# Patient Record
Sex: FEMALE | Race: WHITE | NOT HISPANIC OR LATINO | Employment: UNEMPLOYED | URBAN - METROPOLITAN AREA
[De-identification: names, ages, dates, MRNs, and addresses within clinical notes are randomized per-mention and may not be internally consistent; named-entity substitution may affect disease eponyms.]

---

## 2017-10-02 ENCOUNTER — ALLSCRIPTS OFFICE VISIT (OUTPATIENT)
Dept: OTHER | Facility: OTHER | Age: 62
End: 2017-10-02

## 2017-10-02 DIAGNOSIS — Z12.31 ENCOUNTER FOR SCREENING MAMMOGRAM FOR MALIGNANT NEOPLASM OF BREAST: ICD-10-CM

## 2017-10-04 LAB
HPV 18 (HISTORICAL): NOT DETECTED
HPV HIGH RISK 16/18 (HISTORICAL): NOT DETECTED
HPV16 (HISTORICAL): NOT DETECTED
PAP (HISTORICAL): NORMAL

## 2017-10-09 ENCOUNTER — GENERIC CONVERSION - ENCOUNTER (OUTPATIENT)
Dept: OTHER | Facility: OTHER | Age: 62
End: 2017-10-09

## 2018-01-13 VITALS
HEIGHT: 68 IN | DIASTOLIC BLOOD PRESSURE: 74 MMHG | WEIGHT: 209 LBS | SYSTOLIC BLOOD PRESSURE: 128 MMHG | BODY MASS INDEX: 31.67 KG/M2

## 2019-03-11 ENCOUNTER — ANNUAL EXAM (OUTPATIENT)
Dept: OBGYN CLINIC | Facility: CLINIC | Age: 64
End: 2019-03-11
Payer: COMMERCIAL

## 2019-03-11 VITALS
HEIGHT: 68 IN | BODY MASS INDEX: 32.43 KG/M2 | SYSTOLIC BLOOD PRESSURE: 140 MMHG | WEIGHT: 214 LBS | DIASTOLIC BLOOD PRESSURE: 90 MMHG

## 2019-03-11 DIAGNOSIS — Z01.419 ENCOUNTER FOR GYNECOLOGICAL EXAMINATION WITHOUT ABNORMAL FINDING: Primary | ICD-10-CM

## 2019-03-11 PROCEDURE — S0612 ANNUAL GYNECOLOGICAL EXAMINA: HCPCS | Performed by: OBSTETRICS & GYNECOLOGY

## 2019-03-11 RX ORDER — OMEPRAZOLE 20 MG/1
20 CAPSULE, DELAYED RELEASE ORAL
COMMUNITY

## 2019-03-11 RX ORDER — RAMIPRIL 10 MG/1
CAPSULE ORAL
COMMUNITY
End: 2020-08-11 | Stop reason: SDUPTHER

## 2019-03-11 RX ORDER — CHOLECALCIFEROL (VITAMIN D3) 125 MCG
CAPSULE ORAL
COMMUNITY

## 2019-03-11 RX ORDER — ASPIRIN 81 MG/1
TABLET, CHEWABLE ORAL
COMMUNITY

## 2019-03-11 NOTE — PROGRESS NOTES
Subjective Luiz Spurling is a 60 y o   female who presents for annual well woman exam  Patient reports no bleeding, spotting, or discharge  Patient reports No hot flashes/night sweats,  Not sexually active with , No vaginal dryness, sleeping poorly same as prior, some BENJAMIN reviewed and discussed  Patient still notes salty taste in her mouth and salt in his that she notes to her skin  Advised patient to continue evaluation with primary and get labs which are pending consider further evaluation as needed with endocrinology  Patient does also follow with Rheumatology and has fibromyalgia and Reynauds        Current contraception: post menopausal status  History of abnormal Pap smear: no  Family history of uterine or ovarian cancer: no  Regular self breast exam: yes  History of abnormal mammogram: no  Family history of breast cancer: no    Menstrual History:    Menarche age: 17  LMP      Past Medical History:   Diagnosis Date    Dahl's esophagus     Diverticulitis     Fibromyalgia      Past Surgical History:   Procedure Laterality Date     SECTION  1986     SECTION  1989    ELBOW SURGERY       OB History        3    Para   2    Term                AB   1    Living   2       SAB   1    TAB        Ectopic        Multiple        Live Births   2                 Current Outpatient Medications:     aspirin 81 mg chewable tablet, Chew, Disp: , Rfl:     Cholecalciferol (VITAMIN D3) 1000 units CAPS, Take by mouth, Disp: , Rfl:     omeprazole (PRILOSEC) 20 mg delayed release capsule, Take by mouth, Disp: , Rfl:     ramipril (ALTACE) 10 MG capsule, Take by mouth, Disp: , Rfl:   Allergies   Allergen Reactions    Latex     Meperidine     Penicillins     Sulfa Antibiotics      Social History     Tobacco Use    Smoking status: Never Smoker    Smokeless tobacco: Never Used   Substance Use Topics    Alcohol use: Not Currently    Drug use: Never Review of Systems  Review of Systems   Constitutional: Positive for unexpected weight change  Negative for fatigue and fever  HENT: Negative for dental problem, sinus pressure and sinus pain  Eyes: Negative for visual disturbance  Respiratory: Negative for cough, shortness of breath and wheezing  Cardiovascular: Negative for chest pain and leg swelling  Gastrointestinal: Negative for blood in stool, constipation, diarrhea, nausea and vomiting  Endocrine: Negative for cold intolerance, heat intolerance and polydipsia  Genitourinary: Negative for dysuria, frequency, hematuria, menstrual problem and pelvic pain  Musculoskeletal: Positive for back pain  Negative for arthralgias  Neurological: Negative for dizziness, seizures and headaches  Psychiatric/Behavioral: The patient is nervous/anxious  Objective     Vitals:    19 1625   BP: 140/90   Weight: 97 1 kg (214 lb)   Height: 5' 8" (1 727 m)       General:   alert and oriented, in no acute distress   Heart: regular rate and rhythm, S1, S2 normal, no murmur, click, rub or gallop   Lungs: clear to auscultation bilaterally   Abdomen: soft, non-tender, without masses or organomegaly   Vulva: normal   Vagina: normal mucosa, atrophic   Cervix: no cervical motion tenderness and no lesions   Uterus: anteverted, mobile, non-tender   Adnexa: normal adnexa and no mass, fullness, tenderness   Breast inspection negative, no nipple discharge or bleeding, no masses or nodularity palpable  Rectal negative, stool guaiac negative  Pupils equal round reactive to light and accommodation  Throat clear no lesions or findings  Thyroid normal no masses or nodules       Assessment   60year-old  here for annual exam continuing workup with primary physician regarding saltiness  Patient also plans to follow up with GI for upper and lower endoscopy    2017 normal Pap, 2017 normal mammogram     Plan   Patient given 2 slip for mammogram, return in 1-2 years for annual or sooner as needed

## 2019-06-21 ENCOUNTER — TRANSCRIBE ORDERS (OUTPATIENT)
Dept: LAB | Facility: CLINIC | Age: 64
End: 2019-06-21

## 2019-06-21 ENCOUNTER — APPOINTMENT (OUTPATIENT)
Dept: LAB | Facility: CLINIC | Age: 64
End: 2019-06-21
Payer: COMMERCIAL

## 2019-06-21 DIAGNOSIS — E55.9 AVITAMINOSIS D: Primary | ICD-10-CM

## 2019-06-21 DIAGNOSIS — E03.9 MYXEDEMA HEART DISEASE: ICD-10-CM

## 2019-06-21 DIAGNOSIS — R73.09 IMPAIRED GLUCOSE TOLERANCE TEST: ICD-10-CM

## 2019-06-21 DIAGNOSIS — D64.9 ANEMIA, UNSPECIFIED TYPE: Primary | ICD-10-CM

## 2019-06-21 DIAGNOSIS — E87.0 HYPEROSMOLALITY AND/OR HYPERNATREMIA: ICD-10-CM

## 2019-06-21 DIAGNOSIS — E55.9 AVITAMINOSIS D: ICD-10-CM

## 2019-06-21 DIAGNOSIS — I51.9 MYXEDEMA HEART DISEASE: ICD-10-CM

## 2019-06-21 DIAGNOSIS — E78.5 HYPERLIPIDEMIA, UNSPECIFIED HYPERLIPIDEMIA TYPE: ICD-10-CM

## 2019-06-21 LAB
ALBUMIN SERPL BCP-MCNC: 4.4 G/DL (ref 3.5–5)
ALP SERPL-CCNC: 68 U/L (ref 46–116)
ALT SERPL W P-5'-P-CCNC: 54 U/L (ref 12–78)
ANION GAP SERPL CALCULATED.3IONS-SCNC: 2 MMOL/L (ref 4–13)
AST SERPL W P-5'-P-CCNC: 29 U/L (ref 5–45)
BASOPHILS # BLD AUTO: 0.07 THOUSANDS/ΜL (ref 0–0.1)
BASOPHILS NFR BLD AUTO: 1 % (ref 0–1)
BILIRUB SERPL-MCNC: 0.68 MG/DL (ref 0.2–1)
BILIRUB UR QL STRIP: NEGATIVE
BUN SERPL-MCNC: 18 MG/DL (ref 5–25)
CALCIUM SERPL-MCNC: 9.4 MG/DL (ref 8.3–10.1)
CHLORIDE SERPL-SCNC: 101 MMOL/L (ref 100–108)
CHOLEST SERPL-MCNC: 243 MG/DL (ref 50–200)
CLARITY UR: CLEAR
CO2 SERPL-SCNC: 30 MMOL/L (ref 21–32)
COLOR UR: YELLOW
CREAT SERPL-MCNC: 0.97 MG/DL (ref 0.6–1.3)
EOSINOPHIL # BLD AUTO: 0.08 THOUSAND/ΜL (ref 0–0.61)
EOSINOPHIL NFR BLD AUTO: 1 % (ref 0–6)
ERYTHROCYTE [DISTWIDTH] IN BLOOD BY AUTOMATED COUNT: 13.1 % (ref 11.6–15.1)
EST. AVERAGE GLUCOSE BLD GHB EST-MCNC: 123 MG/DL
GFR SERPL CREATININE-BSD FRML MDRD: 63 ML/MIN/1.73SQ M
GLUCOSE P FAST SERPL-MCNC: 100 MG/DL (ref 65–99)
GLUCOSE UR STRIP-MCNC: NEGATIVE MG/DL
HBA1C MFR BLD: 5.9 % (ref 4.2–6.3)
HCT VFR BLD AUTO: 47.3 % (ref 34.8–46.1)
HDLC SERPL-MCNC: 52 MG/DL (ref 40–60)
HGB BLD-MCNC: 15.1 G/DL (ref 11.5–15.4)
HGB UR QL STRIP.AUTO: NEGATIVE
IMM GRANULOCYTES # BLD AUTO: 0.03 THOUSAND/UL (ref 0–0.2)
IMM GRANULOCYTES NFR BLD AUTO: 1 % (ref 0–2)
KETONES UR STRIP-MCNC: NEGATIVE MG/DL
LDLC SERPL CALC-MCNC: 148 MG/DL (ref 0–100)
LEUKOCYTE ESTERASE UR QL STRIP: NEGATIVE
LYMPHOCYTES # BLD AUTO: 2.11 THOUSANDS/ΜL (ref 0.6–4.47)
LYMPHOCYTES NFR BLD AUTO: 32 % (ref 14–44)
MCH RBC QN AUTO: 30 PG (ref 26.8–34.3)
MCHC RBC AUTO-ENTMCNC: 31.9 G/DL (ref 31.4–37.4)
MCV RBC AUTO: 94 FL (ref 82–98)
MONOCYTES # BLD AUTO: 0.65 THOUSAND/ΜL (ref 0.17–1.22)
MONOCYTES NFR BLD AUTO: 10 % (ref 4–12)
NEUTROPHILS # BLD AUTO: 3.71 THOUSANDS/ΜL (ref 1.85–7.62)
NEUTS SEG NFR BLD AUTO: 55 % (ref 43–75)
NITRITE UR QL STRIP: NEGATIVE
NONHDLC SERPL-MCNC: 191 MG/DL
NRBC BLD AUTO-RTO: 0 /100 WBCS
PH UR STRIP.AUTO: 6 [PH]
PLATELET # BLD AUTO: 328 THOUSANDS/UL (ref 149–390)
PMV BLD AUTO: 10.6 FL (ref 8.9–12.7)
POTASSIUM SERPL-SCNC: 4 MMOL/L (ref 3.5–5.3)
PROT SERPL-MCNC: 7.8 G/DL (ref 6.4–8.2)
PROT UR STRIP-MCNC: NEGATIVE MG/DL
RBC # BLD AUTO: 5.03 MILLION/UL (ref 3.81–5.12)
SODIUM SERPL-SCNC: 133 MMOL/L (ref 136–145)
SP GR UR STRIP.AUTO: 1.01 (ref 1–1.03)
TRIGL SERPL-MCNC: 216 MG/DL
TSH SERPL DL<=0.05 MIU/L-ACNC: 2.65 UIU/ML (ref 0.36–3.74)
UROBILINOGEN UR QL STRIP.AUTO: 0.2 E.U./DL
WBC # BLD AUTO: 6.65 THOUSAND/UL (ref 4.31–10.16)

## 2019-06-21 PROCEDURE — 81003 URINALYSIS AUTO W/O SCOPE: CPT

## 2019-06-21 PROCEDURE — 82306 VITAMIN D 25 HYDROXY: CPT

## 2019-06-21 PROCEDURE — 80061 LIPID PANEL: CPT

## 2019-06-21 PROCEDURE — 80053 COMPREHEN METABOLIC PANEL: CPT

## 2019-06-21 PROCEDURE — 85025 COMPLETE CBC W/AUTO DIFF WBC: CPT

## 2019-06-21 PROCEDURE — 83036 HEMOGLOBIN GLYCOSYLATED A1C: CPT

## 2019-06-21 PROCEDURE — 36415 COLL VENOUS BLD VENIPUNCTURE: CPT

## 2019-06-21 PROCEDURE — 84166 PROTEIN E-PHORESIS/URINE/CSF: CPT

## 2019-06-21 PROCEDURE — 84166 PROTEIN E-PHORESIS/URINE/CSF: CPT | Performed by: PATHOLOGY

## 2019-06-21 PROCEDURE — 84443 ASSAY THYROID STIM HORMONE: CPT

## 2019-06-25 LAB
25(OH)D2 SERPL-MCNC: <1 NG/ML
25(OH)D3 SERPL-MCNC: 50 NG/ML
25(OH)D3+25(OH)D2 SERPL-MCNC: 50 NG/ML
ALBUMIN UR ELPH-MCNC: 100 %
ALPHA1 GLOB MFR UR ELPH: 0 %
ALPHA2 GLOB MFR UR ELPH: 0 %
B-GLOBULIN MFR UR ELPH: 0 %
GAMMA GLOB MFR UR ELPH: 0 %
PROT PATTERN UR ELPH-IMP: NORMAL
PROT UR-MCNC: 6 MG/DL

## 2020-01-23 ENCOUNTER — OFFICE VISIT (OUTPATIENT)
Dept: URGENT CARE | Facility: CLINIC | Age: 65
End: 2020-01-23
Payer: COMMERCIAL

## 2020-01-23 VITALS
DIASTOLIC BLOOD PRESSURE: 81 MMHG | HEIGHT: 67 IN | WEIGHT: 216 LBS | OXYGEN SATURATION: 98 % | TEMPERATURE: 98.4 F | HEART RATE: 88 BPM | RESPIRATION RATE: 16 BRPM | BODY MASS INDEX: 33.9 KG/M2 | SYSTOLIC BLOOD PRESSURE: 177 MMHG

## 2020-01-23 DIAGNOSIS — M62.838 NECK MUSCLE SPASM: Primary | ICD-10-CM

## 2020-01-23 PROCEDURE — 99213 OFFICE O/P EST LOW 20 MIN: CPT | Performed by: PHYSICIAN ASSISTANT

## 2020-01-23 RX ORDER — METAXALONE 800 MG/1
800 TABLET ORAL 3 TIMES DAILY PRN
Qty: 8 TABLET | Refills: 0 | Status: SHIPPED | OUTPATIENT
Start: 2020-01-23 | End: 2020-08-11 | Stop reason: ALTCHOICE

## 2020-01-23 NOTE — PATIENT INSTRUCTIONS
Patient is unable to take anti-inflammatories secondary to past medical history  Tylenol as needed for pain  Ice several times daily then after 5-7 days alternate ice and heat therapy  Prescription for Skelaxin provided-use as directed  Do not drive while taking this medication  Physical therapy as needed  Follow up with PCP in 3-5 days  Proceed to  ER if symptoms worsen  Muscle Spasm   WHAT YOU NEED TO KNOW:   A muscle spasm is a sudden contraction of any muscle or group of muscles  A muscle cramp is a painful muscle spasm  Muscle cramps commonly occur after intense exercise or during pregnancy  They may also be caused by certain medications, dehydration, low calcium or magnesium levels, or another medical condition  DISCHARGE INSTRUCTIONS:   Medicines: You may need the following:  · NSAIDs  help decrease swelling and pain or fever  This medicine is available with or without a doctor's order  NSAIDs can cause stomach bleeding or kidney problems in certain people  If you take blood thinner medicine, always ask your healthcare provider if NSAIDs are safe for you  Always read the medicine label and follow directions  · Take your medicine as directed  Contact your healthcare provider if you think your medicine is not helping or if you have side effects  Tell him of her if you are allergic to any medicine  Keep a list of the medicines, vitamins, and herbs you take  Include the amounts, and when and why you take them  Bring the list or the pill bottles to follow-up visits  Carry your medicine list with you in case of an emergency  Follow up with your healthcare provider as directed: You may need other tests or treatment  You may also be referred to a physical therapist or other specialist  Write down your questions so you remember to ask them during your visits  Self-care:   · Stretch  your muscle to help relieve the cramp   It may be helpful to keep your muscle in the stretched position until the cramp is gone  · Apply heat  to help decrease pain and muscle spasms  Apply heat on the area for 20 to 30 minutes every 2 hours for as many days as directed  · Apply ice  to help decrease swelling and pain  Ice may also help prevent tissue damage  Use an ice pack, or put crushed ice in a plastic bag  Cover it with a towel and place it on your muscle for 15 to 20 minutes every hour or as directed  · Drink more liquids  to help prevent muscle cramps caused by dehydration  Sports drinks may help replace electrolytes you lose through sweat during exercise  Ask your healthcare provider how much liquid to drink each day and which liquids are best for you  · Eat healthy foods , such as fruits, vegetables, whole grains, low-fat dairy products, and lean proteins (meat, beans, and fish)  If you are pregnant, ask your healthcare provider about foods that are high in magnesium and sodium  They may help to relieve cramps during pregnancy  · Massage your muscle  to help relieve the cramp  · Take frequent deep breaths  until the cramp feels better  Lie down while you take the deep breaths so you do not get dizzy or lightheaded  Contact your healthcare provider if:   · You have signs of dehydration, such as a headache, dark yellow urine, dry eyes or mouth, or a fast heartbeat  · You have questions or concerns about your condition or care  Return to the emergency department if:   · You have warmth, swelling, or redness in the cramping muscle  · You have frequent or unrelieved muscle cramps in several different muscles  · You have muscle cramps with numbness, tingling, and burning in your hands and feet  © 2017 [facility] Information is for End User's use only and may not be sold, redistributed or otherwise used for commercial purposes  All illustrations and images included in CareNotes® are the copyrighted property of [employer]  or [employer]    The above information is an  only  It is not intended as medical advice for individual conditions or treatments  Talk to your doctor, nurse or pharmacist before following any medical regimen to see if it is safe and effective for you

## 2020-01-23 NOTE — PROGRESS NOTES
[address] [facility]        NAME: Bruna Julian is a 63 y o  female  : 1955    MRN: [id number]  DATE: 2020  TIME: 11:44 AM    Assessment and Plan   Neck muscle spasm [M62 838]  1  Neck muscle spasm  metaxalone (SKELAXIN) 800 mg tablet         Patient Instructions   Patient is unable to take anti-inflammatories secondary to past medical history  Tylenol as needed for pain  Prescription for Skelaxin provided-use as directed  Do not drive while taking this medication  Physical therapy as needed  Follow up with PCP in 3-5 days  Proceed to  ER if symptoms worsen  Chief Complaint     Chief Complaint   Patient presents with    Neck Pain     left side of neck pain with sudden onset and she also states that she cannot move her head and she has very limited ROM and pain behind her left ear  History of Present Illness   The patient is a 51-year-old female who presents with left neck pain and spasm that started today  She denies any injury or precipitating event  Negative nasal or sinus congestion  Negative facial pain or pressure  Negative ear pain or drainage  She has limited range of motion secondary to the pain  She is not able to take anti inflammatory medications secondary to her history of Dahl's esophagitis and gastric ulcers  HPI    Review of Systems   Review of Systems   Constitutional: Negative for chills and fever  HENT: Negative for congestion, ear discharge, ear pain, hearing loss, postnasal drip, rhinorrhea, sinus pressure, sinus pain and sore throat  Respiratory: Negative for cough  Musculoskeletal: Positive for neck pain and neck stiffness  Neurological: Negative for dizziness, tremors, seizures, syncope, facial asymmetry, speech difficulty, weakness, light-headedness, numbness and headaches  All other systems reviewed and are negative          Current Medications       Current Outpatient Medications:     aspirin 81 mg chewable tablet, Chew, Disp: , Rfl:     Cholecalciferol (VITAMIN D3) 1000 units CAPS, Take by mouth, Disp: , Rfl:     omeprazole (PRILOSEC) 20 mg delayed release capsule, Take by mouth, Disp: , Rfl:     ramipril (ALTACE) 10 MG capsule, Take by mouth, Disp: , Rfl:     metaxalone (SKELAXIN) 800 mg tablet, Take 1 tablet (800 mg total) by mouth 3 (three) times a day as needed for muscle spasms, Disp: 8 tablet, Rfl: 0    Current Allergies     Allergies as of 2020 - Reviewed 2020   Allergen Reaction Noted    Latex  10/05/2015    Meperidine  10/05/2015    Penicillins  10/05/2015    Sulfa antibiotics  10/05/2015            The following portions of the patient's history were reviewed and updated as appropriate: allergies, current medications, past family history, past medical history, past social history, past surgical history and problem list      Past Medical History:   Diagnosis Date    Dahl's esophagus     Diverticulitis     Fibromyalgia     Hypertension        Past Surgical History:   Procedure Laterality Date     SECTION  1986     SECTION  1989    ELBOW SURGERY         Family History   Problem Relation Age of Onset    Diabetes Mother     Hypertension Mother     Prostate cancer Father     Diabetes Father     Hypertension Father     Heart disease Father          Medications have been verified  Objective   BP (!) 177/81   Pulse 88   Temp 98 4 °F (36 9 °C)   Resp 16   Ht 5' 7" (1 702 m)   Wt 98 kg (216 lb)   SpO2 98%   BMI 33 83 kg/m²        Physical Exam     Physical Exam   Constitutional: She appears well-developed and well-nourished  HENT:   Head: Normocephalic and atraumatic  Right Ear: External ear normal    Left Ear: External ear normal    Nose: Nose normal    Mouth/Throat: Oropharynx is clear and moist  No oropharyngeal exudate  Eyes: Pupils are equal, round, and reactive to light  Conjunctivae and EOM are normal  Right eye exhibits no discharge   Left eye exhibits no discharge  No scleral icterus  Pulmonary/Chest: Effort normal  No respiratory distress  Musculoskeletal:        Cervical back: She exhibits decreased range of motion, tenderness, pain and spasm  She exhibits no bony tenderness, no swelling, no edema, no deformity and no laceration  Back:    Skin: She is not diaphoretic  Nursing note and vitals reviewed

## 2020-07-20 ENCOUNTER — ANNUAL EXAM (OUTPATIENT)
Dept: OBGYN CLINIC | Facility: CLINIC | Age: 65
End: 2020-07-20
Payer: COMMERCIAL

## 2020-07-20 VITALS
BODY MASS INDEX: 33.59 KG/M2 | TEMPERATURE: 98.8 F | DIASTOLIC BLOOD PRESSURE: 88 MMHG | HEIGHT: 67 IN | SYSTOLIC BLOOD PRESSURE: 142 MMHG | WEIGHT: 214 LBS

## 2020-07-20 DIAGNOSIS — Z12.39 ENCOUNTER FOR SCREENING FOR MALIGNANT NEOPLASM OF BREAST: ICD-10-CM

## 2020-07-20 DIAGNOSIS — Z01.419 ENCOUNTER FOR GYNECOLOGICAL EXAMINATION WITHOUT ABNORMAL FINDING: Primary | ICD-10-CM

## 2020-07-20 PROCEDURE — 3008F BODY MASS INDEX DOCD: CPT | Performed by: INTERNAL MEDICINE

## 2020-07-20 PROCEDURE — 99396 PREV VISIT EST AGE 40-64: CPT | Performed by: OBSTETRICS & GYNECOLOGY

## 2020-07-20 NOTE — PROGRESS NOTES
Anika Shukla is a 63 y o   female who presents for annual well woman exam   Patient reports that she has had some weight gain and still feels that she has fluid retention and a salty taste in her mouth and her skin over the last 5 years  Patient also continues to have some tight sensation in her hands and feet with her Reynauds  Patient has spoken with her primary care physician but feels that they have not done any further investigation together  She has seen Rheumatology as well  Patient requests referral for a female primary care physician and also we discussed that endocrine may be of future benefit as well  Patient reports no bleeding, spotting, or discharge  Patient reports No hot flashes/night sweats, not sexually active with , No vaginal dryness, sleeping poorly however this is same as prior         Current contraception: post menopausal status  History of abnormal Pap smear: no  Family history of uterine or ovarian cancer: no  Regular self breast exam: yes  History of abnormal mammogram: no  Family history of breast cancer: no    Menstrual History:  OB History        3    Para   2    Term                AB   1    Living   2       SAB   1    TAB        Ectopic        Multiple        Live Births   2                     The following portions of the patient's history were reviewed and updated as appropriate: allergies, current medications, past family history, past medical history, past social history, past surgical history and problem list   Past Medical History:   Diagnosis Date    Dahl's esophagus     Diverticulitis     Fibromyalgia     Hypertension      Past Surgical History:   Procedure Laterality Date     SECTION  1986     SECTION  1989    ELBOW SURGERY       OB History        3    Para   2    Term                AB   1    Living   2       SAB   1    TAB        Ectopic        Multiple        Live Births 2                 Review of Systems  Review of Systems   Constitutional: Negative for chills, fatigue, fever and unexpected weight change  HENT: Negative for dental problem, sinus pressure and sinus pain  Eyes: Negative for visual disturbance  Respiratory: Negative for cough, shortness of breath and wheezing  Cardiovascular: Negative for chest pain and leg swelling  Gastrointestinal: Negative for constipation, diarrhea, nausea and vomiting  Genitourinary: Negative for menstrual problem, pelvic pain and urgency  Musculoskeletal: Negative for back pain  Allergic/Immunologic: Negative for environmental allergies  Neurological: Negative for dizziness and headaches  Objective     Vitals:    20 1059 20 1120   BP: (!) 178/94 142/88   BP Location: Right arm    Patient Position: Sitting    Cuff Size: Standard    Temp: 98 8 °F (37 1 °C)    Weight: 97 1 kg (214 lb)    Height: 5' 7" (1 702 m)        General:   alert and oriented, in no acute distress   Heart: regular rate and rhythm, S1, S2 normal, no murmur, click, rub or gallop   Lungs: clear to auscultation bilaterally   Abdomen: soft, non-tender, without masses or organomegaly   Vulva: normal   Vagina: normal mucosa   Cervix: no bleeding following Pap, no cervical motion tenderness and no lesions   Uterus: normal size, mobile, non-tender   Adnexa: normal adnexa and no mass, fullness, tenderness   Breast inspection negative, no nipple discharge or bleeding, no masses or nodularity palpable  Rectal negative, stool guaiac negative  Thyroid normal no masses or nodules     Assessment   63year-old  here for annual exam last Pap  normal, last mammogram 2019 normal     Plan   Thin prep with Co testing performed, given slip for mammogram, return in 1 year or sooner as needed  Discussed follow-up in further evaluation with primary care physician regarding fluid retention along with salty taste in mouth and on skin

## 2020-07-22 LAB
CYTOLOGIST CVX/VAG CYTO: NORMAL
DX ICD CODE: NORMAL
HPV I/H RISK 1 DNA CVX QL PROBE+SIG AMP: NEGATIVE
HPV LOW RISK DNA CVX QL PROBE+SIG AMP: NEGATIVE
OTHER STN SPEC: NORMAL
PATH REPORT.FINAL DX SPEC: NORMAL
SL AMB NOTE:: NORMAL
SL AMB SPECIMEN ADEQUACY: NORMAL
SL AMB TEST METHODOLOGY: NORMAL

## 2020-08-11 ENCOUNTER — OFFICE VISIT (OUTPATIENT)
Dept: INTERNAL MEDICINE CLINIC | Facility: CLINIC | Age: 65
End: 2020-08-11
Payer: COMMERCIAL

## 2020-08-11 VITALS
HEIGHT: 68 IN | HEART RATE: 101 BPM | RESPIRATION RATE: 18 BRPM | BODY MASS INDEX: 32.54 KG/M2 | DIASTOLIC BLOOD PRESSURE: 92 MMHG | TEMPERATURE: 98.3 F | SYSTOLIC BLOOD PRESSURE: 134 MMHG | OXYGEN SATURATION: 98 %

## 2020-08-11 DIAGNOSIS — I10 ESSENTIAL HYPERTENSION: Primary | ICD-10-CM

## 2020-08-11 DIAGNOSIS — R73.03 PREDIABETES: ICD-10-CM

## 2020-08-11 DIAGNOSIS — T78.40XS ALLERGIC STATE, SEQUELA: ICD-10-CM

## 2020-08-11 DIAGNOSIS — Z11.59 NEED FOR HEPATITIS C SCREENING TEST: ICD-10-CM

## 2020-08-11 DIAGNOSIS — M79.7 FIBROMYALGIA: ICD-10-CM

## 2020-08-11 DIAGNOSIS — E66.09 CLASS 1 OBESITY DUE TO EXCESS CALORIES WITHOUT SERIOUS COMORBIDITY WITH BODY MASS INDEX (BMI) OF 32.0 TO 32.9 IN ADULT: ICD-10-CM

## 2020-08-11 DIAGNOSIS — E78.49 OTHER HYPERLIPIDEMIA: ICD-10-CM

## 2020-08-11 DIAGNOSIS — Z79.899 ENCOUNTER FOR LONG-TERM CURRENT USE OF MEDICATION: ICD-10-CM

## 2020-08-11 PROBLEM — E66.811 CLASS 1 OBESITY DUE TO EXCESS CALORIES WITHOUT SERIOUS COMORBIDITY WITH BODY MASS INDEX (BMI) OF 32.0 TO 32.9 IN ADULT: Status: ACTIVE | Noted: 2020-08-11

## 2020-08-11 PROBLEM — I73.00 RAYNAUD PHENOMENON: Status: ACTIVE | Noted: 2020-08-11

## 2020-08-11 PROBLEM — M72.2 PLANTAR FASCIITIS, BILATERAL: Status: ACTIVE | Noted: 2020-08-11

## 2020-08-11 PROBLEM — Z80.8 FAMILY HISTORY OF MELANOMA: Status: ACTIVE | Noted: 2020-08-11

## 2020-08-11 PROBLEM — M76.61 ACHILLES TENDINITIS OF BOTH LOWER EXTREMITIES: Status: ACTIVE | Noted: 2020-08-11

## 2020-08-11 PROBLEM — T78.40XA ALLERGY: Status: ACTIVE | Noted: 2020-08-11

## 2020-08-11 PROBLEM — K22.70 BARRETT'S ESOPHAGUS: Status: ACTIVE | Noted: 2020-08-11

## 2020-08-11 PROBLEM — M76.62 ACHILLES TENDINITIS OF BOTH LOWER EXTREMITIES: Status: ACTIVE | Noted: 2020-08-11

## 2020-08-11 PROCEDURE — 1036F TOBACCO NON-USER: CPT | Performed by: INTERNAL MEDICINE

## 2020-08-11 PROCEDURE — 3080F DIAST BP >= 90 MM HG: CPT | Performed by: INTERNAL MEDICINE

## 2020-08-11 PROCEDURE — 3075F SYST BP GE 130 - 139MM HG: CPT | Performed by: INTERNAL MEDICINE

## 2020-08-11 PROCEDURE — 3725F SCREEN DEPRESSION PERFORMED: CPT | Performed by: INTERNAL MEDICINE

## 2020-08-11 PROCEDURE — 99204 OFFICE O/P NEW MOD 45 MIN: CPT | Performed by: INTERNAL MEDICINE

## 2020-08-11 RX ORDER — RAMIPRIL 10 MG/1
10 CAPSULE ORAL DAILY
Qty: 90 CAPSULE | Refills: 0 | Status: SHIPPED | OUTPATIENT
Start: 2020-08-11 | End: 2020-11-08

## 2020-08-11 RX ORDER — EPINEPHRINE 0.3 MG/.3ML
0.3 INJECTION SUBCUTANEOUS ONCE
Qty: 0.6 ML | Refills: 0 | Status: SHIPPED | OUTPATIENT
Start: 2020-08-11 | End: 2022-04-07

## 2020-10-30 LAB
HBA1C MFR BLD HPLC: 6 %
HCV AB SER-ACNC: NEGATIVE

## 2020-11-03 ENCOUNTER — TELEPHONE (OUTPATIENT)
Dept: INTERNAL MEDICINE CLINIC | Facility: CLINIC | Age: 65
End: 2020-11-03

## 2020-11-07 DIAGNOSIS — I10 ESSENTIAL HYPERTENSION: ICD-10-CM

## 2020-11-08 RX ORDER — RAMIPRIL 10 MG/1
CAPSULE ORAL
Qty: 90 CAPSULE | Refills: 0 | Status: SHIPPED | OUTPATIENT
Start: 2020-11-08 | End: 2021-02-07

## 2020-12-11 ENCOUNTER — OFFICE VISIT (OUTPATIENT)
Dept: INTERNAL MEDICINE CLINIC | Facility: CLINIC | Age: 65
End: 2020-12-11
Payer: COMMERCIAL

## 2020-12-11 VITALS
BODY MASS INDEX: 31.28 KG/M2 | DIASTOLIC BLOOD PRESSURE: 94 MMHG | TEMPERATURE: 97.6 F | WEIGHT: 206.4 LBS | OXYGEN SATURATION: 98 % | HEIGHT: 68 IN | SYSTOLIC BLOOD PRESSURE: 140 MMHG | HEART RATE: 99 BPM | RESPIRATION RATE: 18 BRPM

## 2020-12-11 DIAGNOSIS — K21.9 GASTROESOPHAGEAL REFLUX DISEASE, UNSPECIFIED WHETHER ESOPHAGITIS PRESENT: Primary | ICD-10-CM

## 2020-12-11 DIAGNOSIS — I10 ESSENTIAL HYPERTENSION: ICD-10-CM

## 2020-12-11 DIAGNOSIS — Z00.00 LABORATORY EXAMINATION ORDERED AS PART OF A ROUTINE GENERAL MEDICAL EXAMINATION: ICD-10-CM

## 2020-12-11 DIAGNOSIS — Z23 NEED FOR SHINGLES VACCINE: ICD-10-CM

## 2020-12-11 DIAGNOSIS — Z71.9 HEALTH COUNSELING: ICD-10-CM

## 2020-12-11 DIAGNOSIS — R73.03 PREDIABETES: ICD-10-CM

## 2020-12-11 DIAGNOSIS — E78.49 OTHER HYPERLIPIDEMIA: ICD-10-CM

## 2020-12-11 DIAGNOSIS — F43.9 STRESS: ICD-10-CM

## 2020-12-11 DIAGNOSIS — E66.09 CLASS 1 OBESITY DUE TO EXCESS CALORIES WITHOUT SERIOUS COMORBIDITY WITH BODY MASS INDEX (BMI) OF 32.0 TO 32.9 IN ADULT: ICD-10-CM

## 2020-12-11 PROCEDURE — 1036F TOBACCO NON-USER: CPT | Performed by: INTERNAL MEDICINE

## 2020-12-11 PROCEDURE — 3080F DIAST BP >= 90 MM HG: CPT | Performed by: INTERNAL MEDICINE

## 2020-12-11 PROCEDURE — 3077F SYST BP >= 140 MM HG: CPT | Performed by: INTERNAL MEDICINE

## 2020-12-11 PROCEDURE — 99214 OFFICE O/P EST MOD 30 MIN: CPT | Performed by: INTERNAL MEDICINE

## 2020-12-11 PROCEDURE — 3008F BODY MASS INDEX DOCD: CPT | Performed by: INTERNAL MEDICINE

## 2020-12-11 RX ORDER — ZOSTER VACCINE RECOMBINANT, ADJUVANTED 50 MCG/0.5
0.5 KIT INTRAMUSCULAR ONCE
Qty: 1 EACH | Refills: 1 | Status: SHIPPED | OUTPATIENT
Start: 2020-12-11 | End: 2020-12-11

## 2021-02-07 DIAGNOSIS — I10 ESSENTIAL HYPERTENSION: ICD-10-CM

## 2021-02-07 RX ORDER — RAMIPRIL 10 MG/1
CAPSULE ORAL
Qty: 90 CAPSULE | Refills: 1 | Status: SHIPPED | OUTPATIENT
Start: 2021-02-07 | End: 2021-09-21

## 2021-05-25 ENCOUNTER — HOSPITAL ENCOUNTER (OUTPATIENT)
Dept: RADIOLOGY | Facility: HOSPITAL | Age: 66
Discharge: HOME | End: 2021-05-25
Attending: OBSTETRICS & GYNECOLOGY
Payer: COMMERCIAL

## 2021-05-25 VITALS — BODY MASS INDEX: 30.31 KG/M2 | HEIGHT: 68 IN | WEIGHT: 200 LBS

## 2021-05-25 DIAGNOSIS — Z12.39 ENCOUNTER FOR SCREENING FOR MALIGNANT NEOPLASM OF BREAST: ICD-10-CM

## 2021-05-25 PROCEDURE — 77063 BREAST TOMOSYNTHESIS BI: CPT

## 2021-05-25 PROCEDURE — 77067 SCR MAMMO BI INCL CAD: CPT

## 2021-07-01 ENCOUNTER — RA CDI HCC (OUTPATIENT)
Dept: OTHER | Facility: HOSPITAL | Age: 66
End: 2021-07-01

## 2021-07-01 NOTE — PROGRESS NOTES
[facility]  coding opportunities          Chart reviewed, no opportunity found: CHART REVIEWED, NO OPPORTUNITY FOUND                     Patients insurance company: [employer] (Medicare Advantage and Commercial)

## 2021-07-08 ENCOUNTER — OFFICE VISIT (OUTPATIENT)
Dept: INTERNAL MEDICINE CLINIC | Facility: CLINIC | Age: 66
End: 2021-07-08
Payer: COMMERCIAL

## 2021-07-08 VITALS
TEMPERATURE: 98.1 F | HEART RATE: 89 BPM | OXYGEN SATURATION: 99 % | SYSTOLIC BLOOD PRESSURE: 136 MMHG | DIASTOLIC BLOOD PRESSURE: 82 MMHG | BODY MASS INDEX: 30.41 KG/M2 | HEIGHT: 68 IN

## 2021-07-08 DIAGNOSIS — E66.09 CLASS 1 OBESITY DUE TO EXCESS CALORIES WITHOUT SERIOUS COMORBIDITY WITH BODY MASS INDEX (BMI) OF 32.0 TO 32.9 IN ADULT: ICD-10-CM

## 2021-07-08 DIAGNOSIS — Z00.00 HEALTH MAINTENANCE EXAMINATION: ICD-10-CM

## 2021-07-08 DIAGNOSIS — M76.61 ACHILLES TENDINITIS OF BOTH LOWER EXTREMITIES: Primary | ICD-10-CM

## 2021-07-08 DIAGNOSIS — F43.9 STRESS: ICD-10-CM

## 2021-07-08 DIAGNOSIS — E78.49 OTHER HYPERLIPIDEMIA: ICD-10-CM

## 2021-07-08 DIAGNOSIS — I10 ESSENTIAL HYPERTENSION: ICD-10-CM

## 2021-07-08 DIAGNOSIS — K22.70 BARRETT'S ESOPHAGUS WITHOUT DYSPLASIA: ICD-10-CM

## 2021-07-08 DIAGNOSIS — M76.62 ACHILLES TENDINITIS OF BOTH LOWER EXTREMITIES: Primary | ICD-10-CM

## 2021-07-08 DIAGNOSIS — R73.03 PREDIABETES: ICD-10-CM

## 2021-07-08 PROBLEM — D58.2 ELEVATED HEMOGLOBIN (HCC): Status: ACTIVE | Noted: 2021-07-08

## 2021-07-08 PROCEDURE — 99396 PREV VISIT EST AGE 40-64: CPT | Performed by: INTERNAL MEDICINE

## 2021-07-08 PROCEDURE — 3075F SYST BP GE 130 - 139MM HG: CPT | Performed by: INTERNAL MEDICINE

## 2021-07-08 PROCEDURE — 3079F DIAST BP 80-89 MM HG: CPT | Performed by: INTERNAL MEDICINE

## 2021-07-08 PROCEDURE — 3725F SCREEN DEPRESSION PERFORMED: CPT | Performed by: INTERNAL MEDICINE

## 2021-07-08 PROCEDURE — 1036F TOBACCO NON-USER: CPT | Performed by: INTERNAL MEDICINE

## 2021-07-08 NOTE — PROGRESS NOTES
Assessment/Plan:    Dahl's esophagus  Takes PPI daily  Essential hypertension  BP controlled, on ramipril  Family history of melanoma  Recently saw dermatology  Achilles tendinitis of both lower extremities  May have peroneal tendonitis also  Follow up with podiatry, recommend physical therapy  Class 1 obesity due to excess calories without serious comorbidity with body mass index (BMI) of 32 0 to 32 9 in adult  Lost 6 lbs since last visit  Discussed low impact exercise  Other hyperlipidemia  Declined statin, discussed low fat diet  Elevated hemoglobin (HCC)  Increase daily fluid intake  Discussed hematology referral, monitor for now  Prediabetes  A1c stable, at 6%  Discussed low carb diet  Stress  Recommend meditation  Diagnoses and all orders for this visit:    Achilles tendinitis of both lower extremities    Class 1 obesity due to excess calories without serious comorbidity with body mass index (BMI) of 32 0 to 32 9 in adult    Stress    Dahl's esophagus without dysplasia    Essential hypertension  -     CBC and differential; Future    Prediabetes  -     Hemoglobin A1C; Future    Other hyperlipidemia  -     Comprehensive metabolic panel; Future  -     Lipid panel; Future    Health maintenance examination  Comments:  Received COVID vaccine  Follow up in 6 months or as needed  Subjective:      Patient ID: Eugene Aceves is a 61 y o  female here for a follow up  She went to Dermatology yesterday, had 2 areas which were biopsied  She also saw her foot doctor recently, she is still suffering from her Achilles tendinitis  She reports having issues on her right leg as well  She was giving Voltaren and developed tongue swelling  She did take Benadryl, did not use her EpiPen  She is concerned about spots on both her big toes  She did show her dermatologist who thought it was due to trauma  She is still stressed with her mother    Her daughter will be visiting from [place] later this month  The following portions of the patient's history were reviewed and updated as appropriate: allergies, current medications, past medical history, past social history and problem list     Review of Systems   Constitutional: Negative for appetite change and fatigue  HENT: Negative for congestion, ear pain and postnasal drip  Eyes: Negative for visual disturbance  Respiratory: Negative for cough and shortness of breath  Cardiovascular: Negative for chest pain and leg swelling  Gastrointestinal: Negative for abdominal pain, constipation and diarrhea  Genitourinary: Negative for dysuria, frequency and urgency  Musculoskeletal: Negative for arthralgias and myalgias  Skin: Negative for rash and wound  Neurological: Negative for dizziness, numbness and headaches  Hematological: Does not bruise/bleed easily  Psychiatric/Behavioral: Negative for confusion  The patient is not nervous/anxious  Objective:      /82   Pulse 89   Temp 98 1 °F (36 7 °C)   Ht 5' 8" (1 727 m)   SpO2 99%   BMI 30 41 kg/m²          Physical Exam  Vitals and nursing note reviewed  Constitutional:       General: She is not in acute distress  Appearance: She is well-developed  HENT:      Head: Normocephalic and atraumatic  Eyes:      Pupils: Pupils are equal, round, and reactive to light  Cardiovascular:      Rate and Rhythm: Normal rate and regular rhythm  Heart sounds: Normal heart sounds  Pulmonary:      Effort: Pulmonary effort is normal       Breath sounds: Normal breath sounds  No wheezing  Abdominal:      General: Bowel sounds are normal       Palpations: Abdomen is soft  Musculoskeletal:         General: No swelling  Skin:     General: Skin is warm  Findings: No rash  Neurological:      General: No focal deficit present  Mental Status: She is alert and oriented to person, place, and time     Psychiatric:         Mood and Affect: Mood normal          Behavior: Behavior normal            Labs & imaging results reviewed with patient

## 2021-08-04 ENCOUNTER — TELEPHONE (OUTPATIENT)
Dept: DERMATOLOGY | Facility: CLINIC | Age: 66
End: 2021-08-04

## 2021-08-11 ENCOUNTER — TELEPHONE (OUTPATIENT)
Dept: DERMATOLOGY | Age: 66
End: 2021-08-11

## 2021-08-16 ENCOUNTER — TELEPHONE (OUTPATIENT)
Dept: DERMATOLOGY | Facility: CLINIC | Age: 66
End: 2021-08-16

## 2021-08-16 NOTE — TELEPHONE ENCOUNTER
Pt called to schedule a MOHS consult  I spoke to pt and she stated she has called several times and no one has called her back  I informed her that MOHS consults have to be scheduled by MOHS and I would forward her message

## 2021-08-17 NOTE — TELEPHONE ENCOUNTER
Telephone call to pt regarding scheduling MOHS consult  LVM advising to return my call at earliest convenience

## 2021-08-18 ENCOUNTER — CONSULT (OUTPATIENT)
Dept: DERMATOLOGY | Facility: CLINIC | Age: 66
End: 2021-08-18
Payer: COMMERCIAL

## 2021-08-18 VITALS — TEMPERATURE: 98.2 F | BODY MASS INDEX: 30.31 KG/M2 | HEIGHT: 68 IN | WEIGHT: 199.96 LBS

## 2021-08-18 DIAGNOSIS — C44.311 BASAL CELL CARCINOMA (BCC) OF SKIN OF NOSE: Primary | ICD-10-CM

## 2021-08-18 PROCEDURE — 99203 OFFICE O/P NEW LOW 30 MIN: CPT | Performed by: DERMATOLOGY

## 2021-08-18 NOTE — LETTER
August 18, 2021     Darvin Meigs, [address] [facility]  [address] [place] [id number]    Patient: Dave Medina   YOB: 1955   Date of Visit: 8/18/2021       Dear Dr Fatoumata Peralta: Thank you for referring Dave Medina to me for evaluation  Below are my notes for this consultation  If you have questions, please do not hesitate to call me  I look forward to following your patient along with you           Sincerely,        Jayna Barth MD        CC: No Recipients

## 2021-08-24 ENCOUNTER — TELEPHONE (OUTPATIENT)
Dept: INTERNAL MEDICINE CLINIC | Facility: CLINIC | Age: 66
End: 2021-08-24

## 2021-08-24 DIAGNOSIS — B34.9 VIRAL INFECTION, UNSPECIFIED: Primary | ICD-10-CM

## 2021-08-24 PROCEDURE — U0003 INFECTIOUS AGENT DETECTION BY NUCLEIC ACID (DNA OR RNA); SEVERE ACUTE RESPIRATORY SYNDROME CORONAVIRUS 2 (SARS-COV-2) (CORONAVIRUS DISEASE [COVID-19]), AMPLIFIED PROBE TECHNIQUE, MAKING USE OF HIGH THROUGHPUT TECHNOLOGIES AS DESCRIBED BY CMS-2020-01-R: HCPCS | Performed by: INTERNAL MEDICINE

## 2021-08-24 PROCEDURE — U0005 INFEC AGEN DETEC AMPLI PROBE: HCPCS | Performed by: INTERNAL MEDICINE

## 2021-08-24 NOTE — TELEPHONE ENCOUNTER
Pt  Has a cough that started last Friday night  Starting to bring up phlegm  Had taken Tylenol sinus but didn't help  Hasn't taken anything else  No fever  Is also hoarse  No sore throat

## 2021-08-24 NOTE — TELEPHONE ENCOUNTER
When were you last exposed to the family member who is currently in quarantine? COVID test ordered  Please give instructions for Urgent care, please go today

## 2021-08-24 NOTE — TELEPHONE ENCOUNTER
Patient called with the following Covid-19 concern:    Patient was exposed to Covid-19? No   Date of Exposure? N/A    Date of last exposure (family member in quarantine)       Patient has the following symptoms: Patient confirmed cough and fatigue  Patient denied fever, nausea, vomiting, GI issues, SOB, Chest pain  Date symptoms started: 8/20/2021    Is the patient a hospital employee?  no  Has patient been vaccinated? yes   If so, when was their last COVID vaccine? 5/10/2021    Will route the following message as HIGH priority to a provider currently in the office for review

## 2021-08-24 NOTE — TELEPHONE ENCOUNTER
Have you been around anyone sick? Any possible COVID exposure? Any diarrhea? Nausea/vomiting? You can use saline nasal spray, take Mucinex  or Robitussin to help with the phlegm

## 2021-08-25 NOTE — TELEPHONE ENCOUNTER
I can give you an albuterol inhaler to help with the wheezing  Do you need cough medication? You can take Mucinex for the phlegm      Offer appt with me or Yolie Barney this week

## 2021-08-25 NOTE — TELEPHONE ENCOUNTER
Pt notified     Patient states she has a slight Wheezing in chest/rattle , more so when lying down   Slight Mucus ( greenish , yellow)     Gets coughing attacks / coughing induced asthma

## 2021-08-25 NOTE — TELEPHONE ENCOUNTER
Pt wanted to schedule her appt for Monday since her temperature went up to 99 9 I let her know fever starts at 100 4  I offered her an appointment with Al Santacruz says she only wanted to see you regarding these symptoms  I put her in the same day slot 11:30 for Monday, do you want me to move her? Please advise

## 2021-08-27 ENCOUNTER — OFFICE VISIT (OUTPATIENT)
Dept: INTERNAL MEDICINE CLINIC | Facility: CLINIC | Age: 66
End: 2021-08-27
Payer: COMMERCIAL

## 2021-08-27 VITALS
SYSTOLIC BLOOD PRESSURE: 140 MMHG | BODY MASS INDEX: 31.13 KG/M2 | DIASTOLIC BLOOD PRESSURE: 100 MMHG | OXYGEN SATURATION: 97 % | WEIGHT: 205.4 LBS | HEART RATE: 92 BPM | TEMPERATURE: 97.1 F | HEIGHT: 68 IN

## 2021-08-27 DIAGNOSIS — I10 ESSENTIAL HYPERTENSION: ICD-10-CM

## 2021-08-27 DIAGNOSIS — J20.9 ACUTE BRONCHITIS, UNSPECIFIED ORGANISM: Primary | ICD-10-CM

## 2021-08-27 DIAGNOSIS — J45.20 MILD INTERMITTENT ASTHMA WITHOUT COMPLICATION: ICD-10-CM

## 2021-08-27 PROCEDURE — 99213 OFFICE O/P EST LOW 20 MIN: CPT | Performed by: INTERNAL MEDICINE

## 2021-08-27 PROCEDURE — 1036F TOBACCO NON-USER: CPT | Performed by: INTERNAL MEDICINE

## 2021-08-27 PROCEDURE — 3008F BODY MASS INDEX DOCD: CPT | Performed by: INTERNAL MEDICINE

## 2021-08-27 RX ORDER — AZITHROMYCIN 250 MG/1
TABLET, FILM COATED ORAL
Qty: 6 TABLET | Refills: 0 | Status: SHIPPED | OUTPATIENT
Start: 2021-08-27 | End: 2021-08-31

## 2021-08-27 NOTE — TELEPHONE ENCOUNTER
Patient with appt today  Please ask If still having fevers for the past 24 hrs, prefer to do video visit instead of coming in the office

## 2021-08-27 NOTE — PROGRESS NOTES
Assessment/Plan:    Asthma  Declined albuterol inhaler, no wheezing  Essential hypertension  Monitor BP at home, elevated today, may be due to medication  On ramipril  Diagnoses and all orders for this visit:    Acute bronchitis, unspecified organism  Comments:  Stop Mucinex-D  Start guaifenesin, keep hydrated  No more fevers  Given antibiotic prescription if symptoms worse over the weekend  Orders:  -     azithromycin (ZITHROMAX) 250 mg tablet; Take 2 tablets today then 1 tablet daily x 4 days    Mild intermittent asthma without complication    Essential hypertension          Subjective:      Patient ID: Brianda Peterson is a 61 y o  female c/o cough  Symptoms started about a week ago  It was initially a dry cough, no sore throat nasal congestion or fevers  Symptoms gradually progressed, she had fevers for about 2 days, was taking Tylenol Sinus  A few days ago, cough became a little more productive, started taking Mucinex D  She reports cough improved, she has been taking it once a day, able to sleep through the night  He denies any wheezing  She feels her voice is more hoarse, feels short of breath when talking  She tested negative for COVID 3 days ago  She reports her mother was diagnosed with a  sinus infection last week, needed change antibiotics  The following portions of the patient's history were reviewed and updated as appropriate: allergies, current medications, past medical history, past social history and problem list     Review of Systems   Constitutional: Positive for fever  Negative for activity change, appetite change and fatigue  HENT: Positive for voice change  Negative for congestion, rhinorrhea, sinus pressure, sinus pain, sore throat and trouble swallowing  Respiratory: Positive for cough and shortness of breath  Negative for chest tightness and wheezing  Cardiovascular: Negative for chest pain  Gastrointestinal: Negative for diarrhea and nausea  Musculoskeletal: Negative for arthralgias  Objective:      /100   Pulse 92   Temp (!) 97 1 °F (36 2 °C)   Ht 5' 8" (1 727 m)   Wt 93 2 kg (205 lb 6 4 oz)   SpO2 97%   BMI 31 23 kg/m²          Physical Exam  Vitals and nursing note reviewed  Constitutional:       General: She is not in acute distress  Appearance: She is well-developed  HENT:      Head: Normocephalic and atraumatic  Right Ear: Tympanic membrane, ear canal and external ear normal       Left Ear: Tympanic membrane, ear canal and external ear normal       Mouth/Throat:      Mouth: Mucous membranes are moist       Pharynx: No oropharyngeal exudate  Eyes:      Pupils: Pupils are equal, round, and reactive to light  Cardiovascular:      Rate and Rhythm: Normal rate and regular rhythm  Heart sounds: Normal heart sounds  Pulmonary:      Effort: Pulmonary effort is normal       Breath sounds: Normal breath sounds  No decreased breath sounds, wheezing or rhonchi  Neurological:      General: No focal deficit present  Mental Status: She is alert and oriented to person, place, and time     Psychiatric:         Behavior: Behavior normal

## 2021-08-30 ENCOUNTER — ANNUAL EXAM (OUTPATIENT)
Dept: OBGYN CLINIC | Facility: CLINIC | Age: 66
End: 2021-08-30
Payer: COMMERCIAL

## 2021-08-30 VITALS
TEMPERATURE: 98.5 F | SYSTOLIC BLOOD PRESSURE: 160 MMHG | BODY MASS INDEX: 31.17 KG/M2 | DIASTOLIC BLOOD PRESSURE: 80 MMHG | WEIGHT: 205 LBS

## 2021-08-30 DIAGNOSIS — Z01.419 ENCOUNTER FOR GYNECOLOGICAL EXAMINATION WITHOUT ABNORMAL FINDING: Primary | ICD-10-CM

## 2021-08-30 DIAGNOSIS — Z12.31 ENCOUNTER FOR SCREENING MAMMOGRAM FOR MALIGNANT NEOPLASM OF BREAST: ICD-10-CM

## 2021-08-30 PROCEDURE — S0612 ANNUAL GYNECOLOGICAL EXAMINA: HCPCS | Performed by: OBSTETRICS & GYNECOLOGY

## 2021-08-30 NOTE — PROGRESS NOTES
Landon Coelho is a 61 y o    female who presents for annual well woman exam   Patient is recur recently recovering from upper respiratory infection was tested for coronavirus a negative saw primary care physician and continues to follow-up  Patient has lost weight and  also reports that the salty taste in her mouth is improved, she is scheduled for colonoscopy  She is satisfied with her current primary care physician  Patient reports no bleeding, spotting, or discharge  Patient reports Yes  hot flashes/night sweats  Though generally rare not bothersome,  Not sexually active with , No vaginal dryness, sleeping poorly,  Same as prior     Also continues to follow with gastroenterology as her  Dahl's esophagus has been watched closely,  Some continued symptoms      Current contraception: post menopausal status  History of abnormal Pap smear: no  Family history of uterine or ovarian cancer: no  Regular self breast exam: yes  History of abnormal mammogram: no  Family history of breast cancer: no    Menstrual History:  OB History        5    Para   4    Term   2            AB   1    Living   2       SAB   1    TAB        Ectopic        Multiple        Live Births   2                  The following portions of the patient's history were reviewed and updated as appropriate: allergies, current medications, past family history, past medical history, past social history, past surgical history and problem list   Past Medical History:   Diagnosis Date    Dahl's esophagus     Basal cell carcinoma 2021    Left nasal ala crease    Closed left ankle fracture     chip    Diverticulitis     Fibromyalgia     GERD (gastroesophageal reflux disease)     Hypertension      Past Surgical History:   Procedure Laterality Date     SECTION  1986     SECTION  1989     SECTION      x2    ELBOW SURGERY Left     cyst removal    SKIN BIOPSY OB History        5    Para   4    Term   2            AB   1    Living   2       SAB   1    TAB        Ectopic        Multiple        Live Births   2                 Review of Systems  Review of Systems   Constitutional: Negative for fatigue, fever and unexpected weight change  HENT: Negative for dental problem, sinus pressure and sinus pain  Eyes: Negative for visual disturbance  Respiratory: Negative for cough, shortness of breath and wheezing  Cardiovascular: Negative for chest pain and leg swelling  Gastrointestinal: Negative for blood in stool, constipation, diarrhea, nausea and vomiting  Endocrine: Negative for cold intolerance, heat intolerance and polydipsia  Genitourinary: Negative for dysuria, frequency, hematuria, menstrual problem and pelvic pain  Musculoskeletal: Negative for arthralgias and back pain  Neurological: Negative for dizziness, seizures and headaches  Psychiatric/Behavioral: The patient is not nervous/anxious                Objective     Vitals:    21 1041   BP: 160/80   Temp: 98 5 °F (36 9 °C)   Weight: 93 kg (205 lb)       General:   alert and oriented, in no acute distress, alert, appears stated age and cooperative   Heart: regular rate and rhythm, S1, S2 normal, no murmur, click, rub or gallop   Lungs: clear to auscultation bilaterally   Abdomen: soft, non-tender, without masses or organomegaly   Vulva: normal   Vagina: normal mucosa   Cervix: no cervical motion tenderness and no lesions   Uterus: normal size, mobile, non-tender   Adnexa: normal adnexa and no mass, fullness, tenderness   Breast inspection negative, no nipple discharge or bleeding, no masses or nodularity palpable  Thyroid normal no masses or nodules     Assessment    61year-old  here for annual exam   Patient's laps Pap was 2020 normal, last mammogram May of 2021 normal     Plan    given slip for mammogram patient has schedule colonoscopy patient should return in 1 year or sooner as needed

## 2021-09-15 ENCOUNTER — PROCEDURE VISIT (OUTPATIENT)
Dept: DERMATOLOGY | Facility: CLINIC | Age: 66
End: 2021-09-15
Payer: COMMERCIAL

## 2021-09-15 VITALS
WEIGHT: 208 LBS | TEMPERATURE: 98.8 F | DIASTOLIC BLOOD PRESSURE: 70 MMHG | BODY MASS INDEX: 31.63 KG/M2 | SYSTOLIC BLOOD PRESSURE: 124 MMHG | HEART RATE: 100 BPM

## 2021-09-15 DIAGNOSIS — C44.311 BASAL CELL CARCINOMA (BCC) OF SKIN OF NOSE: Primary | ICD-10-CM

## 2021-09-15 PROCEDURE — 12051 INTMD RPR FACE/MM 2.5 CM/<: CPT | Performed by: DERMATOLOGY

## 2021-09-15 PROCEDURE — 17311 MOHS 1 STAGE H/N/HF/G: CPT | Performed by: DERMATOLOGY

## 2021-09-15 NOTE — PROGRESS NOTES
MOHS Procedure Note    Patient: Zuleyka Dugan  : 1955  MRN: [id number]  Date: 09/15/2021    History of Present Illness: The patient is a 61 y o  female who presents with complaints of BCC on left nasal ala crease      Past Medical History:   Diagnosis Date    Dahl's esophagus     Basal cell carcinoma 2021    Left nasal ala crease    Closed left ankle fracture     chip    Diverticulitis     Fibromyalgia     GERD (gastroesophageal reflux disease)     Hypertension        Past Surgical History:   Procedure Laterality Date     SECTION  1986     SECTION  1989     SECTION      x2    ELBOW SURGERY Left     cyst removal    MOHS SURGERY Left 09/15/2021    BCC-Dr vann    SKIN BIOPSY           Current Outpatient Medications:     aspirin 81 mg chewable tablet, Chew, Disp: , Rfl:     Cholecalciferol (VITAMIN D3) 1000 units CAPS, Take by mouth, Disp: , Rfl:     omeprazole (PRILOSEC) 20 mg delayed release capsule, Take by mouth, Disp: , Rfl:     ramipril (ALTACE) 10 MG capsule, Take 1 capsule by mouth once daily, Disp: 90 capsule, Rfl: 1    EPINEPHrine (EPIPEN) 0 3 mg/0 3 mL SOAJ, Inject 0 3 mL (0 3 mg total) into a muscle once for 1 dose, Disp: 0 6 mL, Rfl: 0    Allergies   Allergen Reactions    Drug [Diclofenac Sodium] Tongue Swelling    Adhesive [Medical Tape]     Formaldehyde     Garlic - Food Allergy Throat Swelling    Latex     Levaquin [Levofloxacin]      Achilles tendon pain    Meperidine Tongue Swelling    Nsaids      Rectal bleeding    Percocet [Oxycodone-Acetaminophen] Itching    Penicillins Rash    Sulfa Antibiotics Rash       Physical Exam:   Vitals:    09/15/21 0806   BP: 124/70   Pulse: 100   Temp: 98 8 °F (37 1 °C)     General: Awake, Alert, Oriented x 3, Mood and affect appropriate  Respiratory: Respirations even and unlabored  Cardiovascular: Peripheral pulses intact; no edema  Musculoskeletal Exam: n/a    Assessment: 0 3 cm x 0 3 cm pink scar; biopsy confirmed basal cell carcinoma; nodular type     Plan: MOHS today     MOHS Procedure Timeout      Most Recent Value   Timeout:  0807   Patient Identity Verified:  Yes   Correct Site Verified:  Yes   Correct Procedure Verified:  Yes          MOHS Diagnosis/Indication/Location/ID      Most Recent Value   Pathology Type  Basal cell carcinoma   Anatomic Site  -- [Left nasal ala crease]   Indications for MOHS  tumor location   MOHS ID  [id number]          MOHS Site/Accession/Pre-Post      Most Recent Value   Original Site Identified (as submitted by referring clinician)  Photo   Biopsy Accession/Specimen # (as submitted by referring clincian)  [id number]   Pre-MOHS Size Length (cm)  0 3   Pre-MOHS Size Width (cm)  0 3   Post-MOHS Size-Length (cm)  1 3   Post MOHS Size-Width (cm)  1 3   Repair Type  Intermediate layered closure   Suture Type  Ethilon, Vicryl   Ethilon Suture Size  6   Vicryl Suture Size  6   Final repair length (cm):  2   Anesthetic Used  1% Lidocaine without epinephrine          MOHS Tumor Stage 1 Information      Most Recent Value   Tissue Sections (blocks)  1   Microscopic Exam Section 1:  No tumor identified in section  [post aggressive curettage]   Tumor Clear After Stage I? Yes                                                      Patient identified, procedure verified, site identified and verified  Time out completed  Surgical removal of the lesion discussed with the patient (risks and benefits, including possibility of scarring, infection, recurrence or potential for further treatment)  I have specifically identified the site with the patient  I have discussed the fact that the patient will have a scar after the procedure regardless of granulation or repair with sutures  I have discussed that the repair options can range from granulation in some cases to linear or curvilinear closures to larger flaps or grafts    There are sometimes flaps or grafts used that require multiples stages of surgery and will not be completed today, rather be completed over a series of appointments  I have discussed that occasionally due to location, size or depth of the lesion I may recommend consultation with and transfer of care for further removal or the reconstruction to another provider such as ophthalmology surgery, plastic surgery, ENT surgery, or surgical oncology  There are cases in which other testing such as imaging with MRI or CT scan or testing of lymph nodes is recommended because of the nature/depth/location of tumor seen during the removal  There is a risk of injury to nerves causing temporary or permanent numbness or the inability to move muscles full such as the inability to lift eyebrows  Questions answered and verbal and written consent was obtained  With the patient in the supine position and under adequate local anesthesia with 1% lidocaine with epinephrine 1:100,000, the defect was scrubbed with Hibiclens  Sterile drapes were placed from the sterile tray  Because of the location of the surgical defect, an intermediate closure was judged to give the best possible cosmetic and functional result  The edges of the defect were carefully debrided removing any dead or coagulated tissue  Area was widely undermined in deep plane and layered closure with both deep and superficial sutures  Hemostasis was obtained by pinpoint electrocoagulation  Careful planning of removal of redundant tissue at either end of the defect was drawn out so that the suture lines would fall in the optimal orientation with regard to the relaxed skin tension lines  These were then removed with a #15 blade scalpel  The wound was then approximated by a deep layer of buried vertical mattress sutures and the cutaneous margins were approximated and closed by superficial sutures as noted above  Estimated blood loss was less than 5 mL  The patient tolerated the procedure well    The wound was dressed with petrolatum, a non-stick pad, and a compression dressing  Sudheer Berman MD served as the surgeon and pathologist during the procedure  Postoperative care: Wound care discussed at length  I urged the patient to call us if any problems or question should arise  Complications: none  Post-op medications: none  Patient condition after procedure: stable  Discharge plans: Plan for suture removal 7 days, at next scheduled appointment  The tumor qualifies for Mohs based on AUC criteria  Dr Kevin Gan served as the surgeon and pathologist during the procedure  Postoperative care: No would care should be necessary prior to the next visit but I urged the patient to call us if any problems or question should arise prior to that time  If circumstances should change, we will contact the patient to make other arrangements         Scribe Attestation    I,:  Project Manager am acting as a scribe while in the presence of the attending physician :       I,:  Sudheer Berman MD personally performed the services described in this documentation    as scribed in my presence :

## 2021-09-15 NOTE — LETTER
September 15, 2021     Chasidy Vaughn, [address] [facility]  [address]  [address] [address]    Patient: Rei Cardenas   YOB: 1955   Date of Visit: 9/15/2021       Dear Dr Korina Cramer: Thank you for referring Rei Cardenas to me for evaluation  Below are my notes for this consultation  If you have questions, please do not hesitate to call me  I look forward to following your patient along with you  Sincerely,        Fermin Baez MD        CC: No Recipients  Fermin Baez MD  9/15/2021  4:35 PM  Sign when Signing Visit  MOHS Procedure Note    Patient: Rei Cardenas  : 1955  MRN: [id number]  Date: 09/15/2021    History of Present Illness: The patient is a 61 y o  female who presents with complaints of BCC on left nasal ala crease      Past Medical History:   Diagnosis Date    Dahl's esophagus     Basal cell carcinoma 2021    Left nasal ala crease    Closed left ankle fracture     chip    Diverticulitis     Fibromyalgia     GERD (gastroesophageal reflux disease)     Hypertension        Past Surgical History:   Procedure Laterality Date     SECTION  1986     SECTION  1989     SECTION      x2    ELBOW SURGERY Left     cyst removal    MOHS SURGERY Left 09/15/2021    BCC-Dr vann    SKIN BIOPSY           Current Outpatient Medications:     aspirin 81 mg chewable tablet, Chew, Disp: , Rfl:     Cholecalciferol (VITAMIN D3) 1000 units CAPS, Take by mouth, Disp: , Rfl:     omeprazole (PRILOSEC) 20 mg delayed release capsule, Take by mouth, Disp: , Rfl:     ramipril (ALTACE) 10 MG capsule, Take 1 capsule by mouth once daily, Disp: 90 capsule, Rfl: 1    EPINEPHrine (EPIPEN) 0 3 mg/0 3 mL SOAJ, Inject 0 3 mL (0 3 mg total) into a muscle once for 1 dose, Disp: 0 6 mL, Rfl: 0    Allergies   Allergen Reactions    Drug [Diclofenac Sodium] Tongue Swelling    Adhesive [Medical Tape]     Formaldehyde     Garlic - Food Allergy Throat Swelling    Latex     Levaquin [Levofloxacin]      Achilles tendon pain    Meperidine Tongue Swelling    Nsaids      Rectal bleeding    Percocet [Oxycodone-Acetaminophen] Itching    Penicillins Rash    Sulfa Antibiotics Rash       Physical Exam:   Vitals:    09/15/21 0806   BP: 124/70   Pulse: 100   Temp: 98 8 °F (37 1 °C)     General: Awake, Alert, Oriented x 3, Mood and affect appropriate  Respiratory: Respirations even and unlabored  Cardiovascular: Peripheral pulses intact; no edema  Musculoskeletal Exam: n/a    Assessment: 0 3 cm x 0 3 cm pink scar; biopsy confirmed basal cell carcinoma; nodular type     Plan: MOHS today     MOHS Procedure Timeout      Most Recent Value   Timeout:  0807   Patient Identity Verified:  Yes   Correct Site Verified:  Yes   Correct Procedure Verified:  Yes          MOHS Diagnosis/Indication/Location/ID      Most Recent Value   Pathology Type  Basal cell carcinoma   Anatomic Site  -- [Left nasal ala crease]   Indications for MOHS  tumor location   MOHS ID  [id number]          MOHS Site/Accession/Pre-Post      Most Recent Value   Original Site Identified (as submitted by referring clinician)  Photo   Biopsy Accession/Specimen # (as submitted by referring clincian)  [id number]   Pre-MOHS Size Length (cm)  0 3   Pre-MOHS Size Width (cm)  0 3   Post-MOHS Size-Length (cm)  1 3   Post MOHS Size-Width (cm)  1 3   Repair Type  Intermediate layered closure   Suture Type  Ethilon, Vicryl   Ethilon Suture Size  6   Vicryl Suture Size  6   Final repair length (cm):  2   Anesthetic Used  1% Lidocaine without epinephrine          MOHS Tumor Stage 1 Information      Most Recent Value   Tissue Sections (blocks)  1   Microscopic Exam Section 1:  No tumor identified in section  [post aggressive curettage]   Tumor Clear After Stage I? Yes                                                      Patient identified, procedure verified, site identified and verified   Time out completed  Surgical removal of the lesion discussed with the patient (risks and benefits, including possibility of scarring, infection, recurrence or potential for further treatment)  I have specifically identified the site with the patient  I have discussed the fact that the patient will have a scar after the procedure regardless of granulation or repair with sutures  I have discussed that the repair options can range from granulation in some cases to linear or curvilinear closures to larger flaps or grafts  There are sometimes flaps or grafts used that require multiples stages of surgery and will not be completed today, rather be completed over a series of appointments  I have discussed that occasionally due to location, size or depth of the lesion I may recommend consultation with and transfer of care for further removal or the reconstruction to another provider such as ophthalmology surgery, plastic surgery, ENT surgery, or surgical oncology  There are cases in which other testing such as imaging with MRI or CT scan or testing of lymph nodes is recommended because of the nature/depth/location of tumor seen during the removal  There is a risk of injury to nerves causing temporary or permanent numbness or the inability to move muscles full such as the inability to lift eyebrows  Questions answered and verbal and written consent was obtained  With the patient in the supine position and under adequate local anesthesia with 1% lidocaine with epinephrine 1:100,000, the defect was scrubbed with Hibiclens  Sterile drapes were placed from the sterile tray  Because of the location of the surgical defect, an intermediate closure was judged to give the best possible cosmetic and functional result  The edges of the defect were carefully debrided removing any dead or coagulated tissue  Area was widely undermined in deep plane and layered closure with both deep and superficial sutures      Hemostasis was obtained by pinpoint electrocoagulation  Careful planning of removal of redundant tissue at either end of the defect was drawn out so that the suture lines would fall in the optimal orientation with regard to the relaxed skin tension lines  These were then removed with a #15 blade scalpel  The wound was then approximated by a deep layer of buried vertical mattress sutures and the cutaneous margins were approximated and closed by superficial sutures as noted above  Estimated blood loss was less than 5 mL  The patient tolerated the procedure well  The wound was dressed with petrolatum, a non-stick pad, and a compression dressing  Bhavik Lozano MD served as the surgeon and pathologist during the procedure  Postoperative care: Wound care discussed at length  I urged the patient to call us if any problems or question should arise  Complications: none  Post-op medications: none  Patient condition after procedure: stable  Discharge plans: Plan for suture removal 7 days, at next scheduled appointment  The tumor qualifies for Mohs based on AUC criteria  Dr Cam Simpson served as the surgeon and pathologist during the procedure  Postoperative care: No would care should be necessary prior to the next visit but I urged the patient to call us if any problems or question should arise prior to that time  If circumstances should change, we will contact the patient to make other arrangements         Scribe Attestation    I,:  Selventa am acting as a scribe while in the presence of the attending physician :       I,:  Bhavik Lozano MD personally performed the services described in this documentation    as scribed in my presence :

## 2021-09-15 NOTE — PATIENT INSTRUCTIONS
Mohs Microscopic Surgery After Care    What You Will Need to Do After the Procedure  1  Keep the area clean and dry the first day  Try NOT to remove the bandage for the first day  2  Gently clean the area with soap and water and apply Mupirocin ointment to the excision site for up to 2 weeks  3  Apply a clean appropriately sized bandage to area  Gauze and paper tape are recommended for sensitive skin  4  Return for suture removal as instructed (generally 1 week for the face, 2 weeks for the body)  5  Take Acetaminophen (Tylenol) for discomfort, if no contraindications  Do NOT take Ibuprofen or aspirin unless specifically told to do so by your Dermatologist because these medications can make bleeding worse  6  Call our office immediately for signs of infection: fever, chills, increased redness, warmth, tenderness, discomfort/pain, or pus or foul smell coming from the wound  If bleeding is noticed, place a clean cloth over the area and apply firm pressure for thirty minutes  Check the wound ONLY after 30 minutes of direct pressure; do not cheat and sneak a peak, as that does not count  If bleeding persists after 30 minutes of legitimate direct pressure, then try one more round of direct pressure for an additional 10 minutes to the area  Should the bleeding become heavier or not stop after the second attempt, call [facility] directly at [phone] (SKIN) or, if after hours, go to your nearest Emergency Room or Urgent Care

## 2021-09-21 DIAGNOSIS — I10 ESSENTIAL HYPERTENSION: ICD-10-CM

## 2021-09-21 RX ORDER — RAMIPRIL 10 MG/1
CAPSULE ORAL
Qty: 90 CAPSULE | Refills: 0 | Status: SHIPPED | OUTPATIENT
Start: 2021-09-21 | End: 2021-12-28

## 2021-09-22 ENCOUNTER — OFFICE VISIT (OUTPATIENT)
Dept: DERMATOLOGY | Facility: CLINIC | Age: 66
End: 2021-09-22

## 2021-09-22 DIAGNOSIS — Z48.02 ENCOUNTER FOR REMOVAL OF SUTURES: Primary | ICD-10-CM

## 2021-09-22 PROCEDURE — 99024 POSTOP FOLLOW-UP VISIT: CPT | Performed by: DERMATOLOGY

## 2021-09-22 NOTE — PROGRESS NOTES
Suture removal    Date/Time: 9/22/2021 8:57 AM  Performed by: Lorena Garcia RN  Authorized by: Arvin Chen MD   Universal Protocol:  Consent: Verbal consent obtained  Consent given by: patient  Timeout called at: 9/22/2021 8:57 AM   Patient understanding: patient states understanding of the procedure being performed  Patient consent: the patient's understanding of the procedure matches consent given  Procedure consent: procedure consent matches procedure scheduled  Relevant documents: relevant documents present and verified  Test results: test results available and properly labeled  Site marked: the operative site was marked  Radiology Images displayed and confirmed  If images not available, report reviewed: imaging studies not available  Patient identity confirmed: verbally with patient        Patient location:  Clinic  Location:     Laterality:  Left    Location:  [address] location: Left nasal ala crease   Procedure details: Tools used:  Suture removal kit    Wound appearance:  No sign(s) of infection, good wound healing and pink    Number of sutures removed:  5  Post-procedure details:     Post-removal:  Band-Aid applied (Vaseline applied )    Patient tolerance of procedure:   Tolerated well, no immediate complications  Comments:      Patient instructed to follow up in 6 months for a full body skin exam with primary dermatologist

## 2021-10-07 ENCOUNTER — OFFICE VISIT (OUTPATIENT)
Dept: GASTROENTEROLOGY | Facility: CLINIC | Age: 66
End: 2021-10-07
Payer: COMMERCIAL

## 2021-10-07 ENCOUNTER — HOSPITAL ENCOUNTER (OUTPATIENT)
Dept: ULTRASOUND IMAGING | Facility: HOSPITAL | Age: 66
Discharge: HOME | End: 2021-10-07
Attending: INTERNAL MEDICINE
Payer: COMMERCIAL

## 2021-10-07 VITALS — HEIGHT: 68 IN | BODY MASS INDEX: 31.22 KG/M2 | WEIGHT: 206 LBS

## 2021-10-07 DIAGNOSIS — K21.00 GASTROESOPHAGEAL REFLUX DISEASE WITH ESOPHAGITIS WITHOUT HEMORRHAGE: ICD-10-CM

## 2021-10-07 DIAGNOSIS — K57.90 DIVERTICULOSIS: ICD-10-CM

## 2021-10-07 DIAGNOSIS — K22.70 BARRETT'S ESOPHAGUS WITHOUT DYSPLASIA: ICD-10-CM

## 2021-10-07 DIAGNOSIS — Z86.010 HX OF ADENOMATOUS COLONIC POLYPS: ICD-10-CM

## 2021-10-07 DIAGNOSIS — R10.11 RIGHT UPPER QUADRANT PAIN: ICD-10-CM

## 2021-10-07 DIAGNOSIS — R10.11 RIGHT UPPER QUADRANT PAIN: Primary | ICD-10-CM

## 2021-10-07 PROBLEM — Z86.0101 HX OF ADENOMATOUS COLONIC POLYPS: Status: ACTIVE | Noted: 2021-10-07

## 2021-10-07 PROCEDURE — 99214 OFFICE O/P EST MOD 30 MIN: CPT | Performed by: INTERNAL MEDICINE

## 2021-10-07 PROCEDURE — 76700 US EXAM ABDOM COMPLETE: CPT

## 2021-10-08 ENCOUNTER — TELEPHONE (OUTPATIENT)
Dept: GASTROENTEROLOGY | Facility: CLINIC | Age: 66
End: 2021-10-08

## 2021-10-19 ENCOUNTER — CONSULT (OUTPATIENT)
Dept: SURGERY | Facility: CLINIC | Age: 66
End: 2021-10-19
Payer: COMMERCIAL

## 2021-10-19 ENCOUNTER — PREP FOR PROCEDURE (OUTPATIENT)
Dept: SURGERY | Facility: CLINIC | Age: 66
End: 2021-10-19

## 2021-10-19 VITALS — HEIGHT: 68 IN | WEIGHT: 200 LBS | BODY MASS INDEX: 30.31 KG/M2

## 2021-10-19 DIAGNOSIS — K80.10 CHRONIC CALCULOUS CHOLECYSTITIS: Primary | ICD-10-CM

## 2021-10-19 DIAGNOSIS — K80.10 CALCULUS OF GALLBLADDER WITH CHOLECYSTITIS: Primary | ICD-10-CM

## 2021-10-19 PROCEDURE — 3008F BODY MASS INDEX DOCD: CPT | Performed by: SURGERY

## 2021-10-19 PROCEDURE — 99242 OFF/OP CONSLTJ NEW/EST SF 20: CPT | Performed by: SURGERY

## 2021-10-20 PROBLEM — K80.10 CHRONIC CALCULOUS CHOLECYSTITIS: Status: ACTIVE | Noted: 2021-10-20

## 2021-11-17 ENCOUNTER — APPOINTMENT (OUTPATIENT)
Dept: LAB | Facility: HOSPITAL | Age: 66
End: 2021-11-17
Payer: COMMERCIAL

## 2021-11-17 PROCEDURE — 93005 ELECTROCARDIOGRAM TRACING: CPT

## 2021-11-18 ENCOUNTER — OFFICE VISIT (OUTPATIENT)
Dept: LAB | Age: 66
End: 2021-11-18
Payer: COMMERCIAL

## 2021-11-18 DIAGNOSIS — K80.10 CALCULUS OF GALLBLADDER WITH CHOLECYSTITIS: ICD-10-CM

## 2021-11-19 ENCOUNTER — TELEPHONE (OUTPATIENT)
Dept: DERMATOLOGY | Facility: CLINIC | Age: 66
End: 2021-11-19

## 2021-11-19 LAB
ATRIAL RATE: 89 BPM
P AXIS: 67 DEGREES
PR INTERVAL: 152 MS
QRS AXIS: -9 DEGREES
QRSD INTERVAL: 84 MS
QT INTERVAL: 374 MS
QTC INTERVAL: 455 MS
T WAVE AXIS: 40 DEGREES
VENTRICULAR RATE: 89 BPM

## 2021-11-19 PROCEDURE — 93010 ELECTROCARDIOGRAM REPORT: CPT | Performed by: INTERNAL MEDICINE

## 2021-11-23 ENCOUNTER — OFFICE VISIT (OUTPATIENT)
Dept: SURGERY | Facility: CLINIC | Age: 66
End: 2021-11-23
Payer: COMMERCIAL

## 2021-11-23 DIAGNOSIS — K80.10 CHRONIC CALCULOUS CHOLECYSTITIS: Primary | ICD-10-CM

## 2021-11-23 PROCEDURE — 99212 OFFICE O/P EST SF 10 MIN: CPT | Performed by: SURGERY

## 2021-11-23 PROCEDURE — 1036F TOBACCO NON-USER: CPT | Performed by: SURGERY

## 2021-12-03 ENCOUNTER — ANESTHESIA (OUTPATIENT)
Dept: PERIOP | Facility: HOSPITAL | Age: 66
End: 2021-12-03
Payer: COMMERCIAL

## 2021-12-03 ENCOUNTER — ANESTHESIA EVENT (OUTPATIENT)
Dept: PERIOP | Facility: HOSPITAL | Age: 66
End: 2021-12-03
Payer: COMMERCIAL

## 2021-12-03 ENCOUNTER — HOSPITAL ENCOUNTER (OUTPATIENT)
Facility: HOSPITAL | Age: 66
Setting detail: OUTPATIENT SURGERY
Discharge: HOME | End: 2021-12-03
Attending: SURGERY | Admitting: SURGERY
Payer: COMMERCIAL

## 2021-12-03 VITALS
SYSTOLIC BLOOD PRESSURE: 114 MMHG | RESPIRATION RATE: 16 BRPM | OXYGEN SATURATION: 96 % | HEIGHT: 68 IN | HEART RATE: 62 BPM | WEIGHT: 200 LBS | BODY MASS INDEX: 30.31 KG/M2 | DIASTOLIC BLOOD PRESSURE: 57 MMHG | TEMPERATURE: 97.1 F

## 2021-12-03 DIAGNOSIS — K80.10 CALCULUS OF GALLBLADDER WITH CHOLECYSTITIS: ICD-10-CM

## 2021-12-03 DIAGNOSIS — K80.10 CHRONIC CALCULOUS CHOLECYSTITIS: Primary | ICD-10-CM

## 2021-12-03 PROCEDURE — 88304 TISSUE EXAM BY PATHOLOGIST: CPT | Performed by: PATHOLOGY

## 2021-12-03 PROCEDURE — 47562 LAPAROSCOPIC CHOLECYSTECTOMY: CPT | Performed by: SURGERY

## 2021-12-03 RX ORDER — DEXAMETHASONE SODIUM PHOSPHATE 4 MG/ML
INJECTION, SOLUTION INTRA-ARTICULAR; INTRALESIONAL; INTRAMUSCULAR; INTRAVENOUS; SOFT TISSUE AS NEEDED
Status: DISCONTINUED | OUTPATIENT
Start: 2021-12-03 | End: 2021-12-03

## 2021-12-03 RX ORDER — MAGNESIUM HYDROXIDE 1200 MG/15ML
LIQUID ORAL AS NEEDED
Status: DISCONTINUED | OUTPATIENT
Start: 2021-12-03 | End: 2021-12-03 | Stop reason: HOSPADM

## 2021-12-03 RX ORDER — SODIUM CHLORIDE, SODIUM LACTATE, POTASSIUM CHLORIDE, CALCIUM CHLORIDE 600; 310; 30; 20 MG/100ML; MG/100ML; MG/100ML; MG/100ML
INJECTION, SOLUTION INTRAVENOUS CONTINUOUS PRN
Status: DISCONTINUED | OUTPATIENT
Start: 2021-12-03 | End: 2021-12-03

## 2021-12-03 RX ORDER — PROPOFOL 10 MG/ML
INJECTION, EMULSION INTRAVENOUS AS NEEDED
Status: DISCONTINUED | OUTPATIENT
Start: 2021-12-03 | End: 2021-12-03

## 2021-12-03 RX ORDER — ROCURONIUM BROMIDE 10 MG/ML
INJECTION, SOLUTION INTRAVENOUS AS NEEDED
Status: DISCONTINUED | OUTPATIENT
Start: 2021-12-03 | End: 2021-12-03

## 2021-12-03 RX ORDER — NEOSTIGMINE METHYLSULFATE 1 MG/ML
INJECTION INTRAVENOUS AS NEEDED
Status: DISCONTINUED | OUTPATIENT
Start: 2021-12-03 | End: 2021-12-03

## 2021-12-03 RX ORDER — FENTANYL CITRATE/PF 50 MCG/ML
25 SYRINGE (ML) INJECTION
Status: DISCONTINUED | OUTPATIENT
Start: 2021-12-03 | End: 2021-12-03 | Stop reason: HOSPADM

## 2021-12-03 RX ORDER — LIDOCAINE HYDROCHLORIDE 10 MG/ML
INJECTION, SOLUTION EPIDURAL; INFILTRATION; INTRACAUDAL; PERINEURAL AS NEEDED
Status: DISCONTINUED | OUTPATIENT
Start: 2021-12-03 | End: 2021-12-03

## 2021-12-03 RX ORDER — ONDANSETRON 2 MG/ML
4 INJECTION INTRAMUSCULAR; INTRAVENOUS EVERY 4 HOURS PRN
Status: CANCELLED | OUTPATIENT
Start: 2021-12-03

## 2021-12-03 RX ORDER — SODIUM CHLORIDE, SODIUM LACTATE, POTASSIUM CHLORIDE, CALCIUM CHLORIDE 600; 310; 30; 20 MG/100ML; MG/100ML; MG/100ML; MG/100ML
125 INJECTION, SOLUTION INTRAVENOUS CONTINUOUS
Status: DISCONTINUED | OUTPATIENT
Start: 2021-12-03 | End: 2021-12-03 | Stop reason: HOSPADM

## 2021-12-03 RX ORDER — OXYCODONE HYDROCHLORIDE AND ACETAMINOPHEN 5; 325 MG/1; MG/1
1 TABLET ORAL EVERY 4 HOURS PRN
Status: CANCELLED | OUTPATIENT
Start: 2021-12-03

## 2021-12-03 RX ORDER — GLYCOPYRROLATE 0.2 MG/ML
INJECTION INTRAMUSCULAR; INTRAVENOUS AS NEEDED
Status: DISCONTINUED | OUTPATIENT
Start: 2021-12-03 | End: 2021-12-03

## 2021-12-03 RX ORDER — ONDANSETRON 2 MG/ML
4 INJECTION INTRAMUSCULAR; INTRAVENOUS ONCE AS NEEDED
Status: DISCONTINUED | OUTPATIENT
Start: 2021-12-03 | End: 2021-12-03 | Stop reason: HOSPADM

## 2021-12-03 RX ORDER — SUCCINYLCHOLINE/SOD CL,ISO/PF 100 MG/5ML
SYRINGE (ML) INTRAVENOUS AS NEEDED
Status: DISCONTINUED | OUTPATIENT
Start: 2021-12-03 | End: 2021-12-03

## 2021-12-03 RX ORDER — BUPIVACAINE HYDROCHLORIDE AND EPINEPHRINE 2.5; 5 MG/ML; UG/ML
INJECTION, SOLUTION EPIDURAL; INFILTRATION; INTRACAUDAL; PERINEURAL AS NEEDED
Status: DISCONTINUED | OUTPATIENT
Start: 2021-12-03 | End: 2021-12-03 | Stop reason: HOSPADM

## 2021-12-03 RX ORDER — HYDROCODONE BITARTRATE AND ACETAMINOPHEN 5; 325 MG/1; MG/1
1 TABLET ORAL EVERY 6 HOURS PRN
Qty: 10 TABLET | Refills: 0 | Status: SHIPPED | OUTPATIENT
Start: 2021-12-03 | End: 2022-04-07 | Stop reason: ALTCHOICE

## 2021-12-03 RX ORDER — LIDOCAINE HYDROCHLORIDE 10 MG/ML
0.5 INJECTION, SOLUTION EPIDURAL; INFILTRATION; INTRACAUDAL; PERINEURAL ONCE AS NEEDED
Status: DISCONTINUED | OUTPATIENT
Start: 2021-12-03 | End: 2021-12-03 | Stop reason: HOSPADM

## 2021-12-03 RX ORDER — FENTANYL CITRATE 50 UG/ML
INJECTION, SOLUTION INTRAMUSCULAR; INTRAVENOUS AS NEEDED
Status: DISCONTINUED | OUTPATIENT
Start: 2021-12-03 | End: 2021-12-03

## 2021-12-03 RX ORDER — ONDANSETRON 2 MG/ML
INJECTION INTRAMUSCULAR; INTRAVENOUS AS NEEDED
Status: DISCONTINUED | OUTPATIENT
Start: 2021-12-03 | End: 2021-12-03

## 2021-12-03 RX ORDER — HYDROCODONE BITARTRATE AND ACETAMINOPHEN 5; 325 MG/1; MG/1
1 TABLET ORAL EVERY 6 HOURS PRN
Status: CANCELLED | OUTPATIENT
Start: 2021-12-03

## 2021-12-03 RX ORDER — CEFAZOLIN SODIUM 2 G/50ML
2000 SOLUTION INTRAVENOUS ONCE
Status: COMPLETED | OUTPATIENT
Start: 2021-12-03 | End: 2021-12-03

## 2021-12-03 RX ORDER — ACETAMINOPHEN 325 MG/1
650 TABLET ORAL EVERY 4 HOURS PRN
Status: CANCELLED | OUTPATIENT
Start: 2021-12-03

## 2021-12-03 RX ORDER — MIDAZOLAM HYDROCHLORIDE 2 MG/2ML
INJECTION, SOLUTION INTRAMUSCULAR; INTRAVENOUS AS NEEDED
Status: DISCONTINUED | OUTPATIENT
Start: 2021-12-03 | End: 2021-12-03

## 2021-12-03 RX ADMIN — SODIUM CHLORIDE, SODIUM LACTATE, POTASSIUM CHLORIDE, AND CALCIUM CHLORIDE: .6; .31; .03; .02 INJECTION, SOLUTION INTRAVENOUS at 13:35

## 2021-12-03 RX ADMIN — SUGAMMADEX 200 MG: 100 INJECTION, SOLUTION INTRAVENOUS at 13:57

## 2021-12-03 RX ADMIN — PROPOFOL 200 MG: 10 INJECTION, EMULSION INTRAVENOUS at 13:04

## 2021-12-03 RX ADMIN — FENTANYL CITRATE 50 MCG: 50 INJECTION, SOLUTION INTRAMUSCULAR; INTRAVENOUS at 13:04

## 2021-12-03 RX ADMIN — GLYCOPYRROLATE 0.4 MG: 0.2 INJECTION, SOLUTION INTRAMUSCULAR; INTRAVENOUS at 13:44

## 2021-12-03 RX ADMIN — LIDOCAINE HYDROCHLORIDE 50 MG: 10 INJECTION, SOLUTION EPIDURAL; INFILTRATION; INTRACAUDAL at 13:04

## 2021-12-03 RX ADMIN — DEXAMETHASONE SODIUM PHOSPHATE 10 MG: 4 INJECTION INTRA-ARTICULAR; INTRALESIONAL; INTRAMUSCULAR; INTRAVENOUS; SOFT TISSUE at 13:13

## 2021-12-03 RX ADMIN — ONDANSETRON 4 MG: 2 INJECTION INTRAMUSCULAR; INTRAVENOUS at 13:44

## 2021-12-03 RX ADMIN — FENTANYL CITRATE 25 MCG: 50 INJECTION INTRAMUSCULAR; INTRAVENOUS at 14:12

## 2021-12-03 RX ADMIN — NEOSTIGMINE METHYLSULFATE 3 MG: 1 INJECTION INTRAVENOUS at 13:44

## 2021-12-03 RX ADMIN — SODIUM CHLORIDE, SODIUM LACTATE, POTASSIUM CHLORIDE, AND CALCIUM CHLORIDE: .6; .31; .03; .02 INJECTION, SOLUTION INTRAVENOUS at 13:13

## 2021-12-03 RX ADMIN — FENTANYL CITRATE 50 MCG: 50 INJECTION, SOLUTION INTRAMUSCULAR; INTRAVENOUS at 12:57

## 2021-12-03 RX ADMIN — MIDAZOLAM HYDROCHLORIDE 2 MG: 1 INJECTION, SOLUTION INTRAMUSCULAR; INTRAVENOUS at 12:57

## 2021-12-03 RX ADMIN — CEFAZOLIN SODIUM 2000 MG: 2 SOLUTION INTRAVENOUS at 13:01

## 2021-12-03 RX ADMIN — FENTANYL CITRATE 25 MCG: 50 INJECTION INTRAMUSCULAR; INTRAVENOUS at 14:17

## 2021-12-03 RX ADMIN — ROCURONIUM BROMIDE 50 MG: 10 SOLUTION INTRAVENOUS at 13:13

## 2021-12-03 RX ADMIN — Medication 100 MG: at 13:04

## 2021-12-20 ENCOUNTER — OFFICE VISIT (OUTPATIENT)
Dept: SURGERY | Facility: CLINIC | Age: 66
End: 2021-12-20

## 2021-12-20 DIAGNOSIS — Z48.89 POSTOPERATIVE VISIT: Primary | ICD-10-CM

## 2021-12-20 PROCEDURE — 99024 POSTOP FOLLOW-UP VISIT: CPT | Performed by: SURGERY

## 2021-12-28 DIAGNOSIS — I10 ESSENTIAL HYPERTENSION: ICD-10-CM

## 2021-12-28 RX ORDER — RAMIPRIL 10 MG/1
CAPSULE ORAL
Qty: 90 CAPSULE | Refills: 0 | Status: SHIPPED | OUTPATIENT
Start: 2021-12-28 | End: 2022-04-07 | Stop reason: SDUPTHER

## 2022-04-07 ENCOUNTER — OFFICE VISIT (OUTPATIENT)
Dept: INTERNAL MEDICINE CLINIC | Facility: CLINIC | Age: 67
End: 2022-04-07
Payer: MEDICARE

## 2022-04-07 VITALS
TEMPERATURE: 97 F | HEIGHT: 68 IN | WEIGHT: 207 LBS | DIASTOLIC BLOOD PRESSURE: 78 MMHG | OXYGEN SATURATION: 97 % | HEART RATE: 105 BPM | BODY MASS INDEX: 31.37 KG/M2 | SYSTOLIC BLOOD PRESSURE: 142 MMHG

## 2022-04-07 DIAGNOSIS — Z00.00 HEALTH MAINTENANCE EXAMINATION: ICD-10-CM

## 2022-04-07 DIAGNOSIS — E78.49 OTHER HYPERLIPIDEMIA: Primary | ICD-10-CM

## 2022-04-07 DIAGNOSIS — Z78.0 POSTMENOPAUSAL: ICD-10-CM

## 2022-04-07 DIAGNOSIS — I10 ESSENTIAL HYPERTENSION: ICD-10-CM

## 2022-04-07 DIAGNOSIS — Z80.8 FAMILY HISTORY OF MELANOMA: ICD-10-CM

## 2022-04-07 DIAGNOSIS — R73.03 PREDIABETES: ICD-10-CM

## 2022-04-07 DIAGNOSIS — M79.7 FIBROMYALGIA: ICD-10-CM

## 2022-04-07 DIAGNOSIS — M72.2 PLANTAR FASCIITIS, BILATERAL: ICD-10-CM

## 2022-04-07 DIAGNOSIS — T78.40XS ALLERGY, SEQUELA: ICD-10-CM

## 2022-04-07 PROBLEM — R10.11 RIGHT UPPER QUADRANT PAIN: Status: RESOLVED | Noted: 2021-10-07 | Resolved: 2022-04-07

## 2022-04-07 PROBLEM — Z48.89 POSTOPERATIVE VISIT: Status: RESOLVED | Noted: 2021-12-20 | Resolved: 2022-04-07

## 2022-04-07 PROCEDURE — G0402 INITIAL PREVENTIVE EXAM: HCPCS | Performed by: INTERNAL MEDICINE

## 2022-04-07 PROCEDURE — 99214 OFFICE O/P EST MOD 30 MIN: CPT | Performed by: INTERNAL MEDICINE

## 2022-04-07 PROCEDURE — 1123F ACP DISCUSS/DSCN MKR DOCD: CPT | Performed by: INTERNAL MEDICINE

## 2022-04-07 RX ORDER — EPINEPHRINE 0.3 MG/.3ML
0.3 INJECTION SUBCUTANEOUS ONCE
Qty: 0.6 ML | Refills: 0 | Status: SHIPPED | OUTPATIENT
Start: 2022-04-07 | End: 2022-04-07

## 2022-04-07 RX ORDER — ROSUVASTATIN CALCIUM 5 MG/1
5 TABLET, COATED ORAL DAILY
Qty: 90 TABLET | Refills: 0 | Status: SHIPPED | OUTPATIENT
Start: 2022-04-07

## 2022-04-07 RX ORDER — RAMIPRIL 10 MG/1
10 CAPSULE ORAL DAILY
Qty: 90 CAPSULE | Refills: 1 | Status: SHIPPED | OUTPATIENT
Start: 2022-04-07

## 2022-04-07 NOTE — PROGRESS NOTES
Assessment and Plan:     Problem List Items Addressed This Visit        Cardiovascular and Mediastinum    Essential hypertension     BP stable, on ramipril  Relevant Medications    ramipril (ALTACE) 10 MG capsule    EPINEPHrine (EPIPEN) 0 3 mg/0 3 mL SOAJ    Other Relevant Orders    CBC and differential    TSH, 3rd generation with Free T4 reflex       Musculoskeletal and Integument    Plantar fasciitis, bilateral     No recent issues  Other    Allergy    Relevant Medications    EPINEPHrine (EPIPEN) 0 3 mg/0 3 mL SOAJ    Family history of melanoma     Sees dermatology regular  Recent Mohs surgery  Fibromyalgia    Other hyperlipidemia - Primary     Lipids remain elevated  (+) family hx  She will consider rosuvastatin 5 mg 2 to 3 days a week  Relevant Medications    rosuvastatin (CRESTOR) 5 mg tablet    Other Relevant Orders    Comprehensive metabolic panel    Lipid panel    Prediabetes     A1c stable at 5 9%  Relevant Orders    Hemoglobin A1C      Other Visit Diagnoses     Postmenopausal        Relevant Orders    DXA bone density spine hip and pelvis    Health maintenance examination        Declined Prevnar  Bone density due  BMI Counseling: Body mass index is 31 47 kg/m²  The BMI is above normal  Nutrition recommendations include encouraging healthy choices of fruits and vegetables, decreasing fast food intake and moderation in carbohydrate intake  Exercise recommendations include exercising 3-5 times per week and strength training exercises  Rationale for BMI follow-up plan is due to patient being overweight or obese  Preventive health issues were discussed with patient, and age appropriate screening tests were ordered as noted in patient's After Visit Summary  Personalized health advice and appropriate referrals for health education or preventive services given if needed, as noted in patient's After Visit Summary       History of Present Illness: Patient presents for Medicare Annual Wellness visit    Patient Care Team:  Jamaal Kapoor MD as PCP - General (Internal Medicine)  Rosangela Santiago MD     Problem List:     Patient Active Problem List   Diagnosis    Asthma    Essential hypertension    Fibromyalgia    GERD (gastroesophageal reflux disease)    Other hyperlipidemia    Dahl's esophagus    Prediabetes    Class 1 obesity due to excess calories without serious comorbidity with body mass index (BMI) of 32 0 to 32 9 in adult    Plantar fasciitis, bilateral    Achilles tendinitis of both lower extremities    Allergy    Family history of melanoma    Raynaud phenomenon    Stress    Elevated hemoglobin ([address] )    Hx of adenomatous colonic polyps    Diverticulosis    Chronic calculous cholecystitis      Past Medical and Surgical History:     Past Medical History:   Diagnosis Date    Asthma     Cough/Allergy Induced    Adhl's esophagus     Basal cell carcinoma 2021    Left nasal ala crease    Closed left ankle fracture     chip    Diverticulitis     Fibromyalgia, primary     GERD (gastroesophageal reflux disease)     Hypertension      Past Surgical History:   Procedure Laterality Date     SECTION  1986     SECTION  1989    COLONOSCOPY      EGD      ELBOW SURGERY Left     cyst removal    MOHS SURGERY Left 09/15/2021    [facility]-Dr vann    [place] LAP,CHOLECYSTECTOMY N/A 12/3/2021    Procedure: LAPAROSCOPIC CHOLECYSTECTOMY;  Surgeon: Tony Hong MD;  [facility];  Service: General    SKIN BIOPSY        Family History:     Family History   Problem Relation Age of Onset    Diabetes Mother 79    Hypertension Mother     Heart disease Mother     Prostate cancer Father     Diabetes Father 79    Hypertension Father     Heart disease Father 61    Stroke Father     Melanoma Sister     Dahl's esophagus Sister     Melanoma Brother     Asthma Brother     Lung disease Brother     Dahl's esophagus Brother     Colon cancer Maternal Grandfather     Alcohol abuse Neg Hx     Substance Abuse Neg Hx     Mental illness Neg Hx     Depression Neg Hx       Social History:     Social History     Socioeconomic History    Marital status: /Civil Union     Spouse name: None    Number of children: None    Years of education: None    Highest education level: None   Occupational History    None   Tobacco Use    Smoking status: Never Smoker    Smokeless tobacco: Never Used   Vaping Use    Vaping Use: Never used   Substance and Sexual Activity    Alcohol use: Yes     Comment: Rare Socially    Drug use: Never    Sexual activity: Yes     Partners: Male     Birth control/protection: None     Comment: declines STD testing    Other Topics Concern    None   Social History Narrative        Lives at home with  and son    Has a daughter in [place]    Drinks coffee 1 cup/daily     Social Determinants of Health     Financial Resource Strain: Not on file   Food Insecurity: Not on file   Transportation Needs: Not on file   Physical Activity: Not on file   Stress: Not on file   Social Connections: Not on file   Intimate Partner Violence: Not on file   Housing Stability: Not on file      Medications and Allergies:     Current Outpatient Medications   Medication Sig Dispense Refill    aspirin 81 mg chewable tablet Chew      Cholecalciferol (Vitamin D3) 50 MCG (2000 UT) TABS Take by mouth        EPINEPHrine (EPIPEN) 0 3 mg/0 3 mL SOAJ Inject 0 3 mL (0 3 mg total) into a muscle once for 1 dose 0 6 mL 0    Multiple Vitamins-Minerals (CENTRUM ADULTS PO) Take by mouth      omeprazole (PRILOSEC) 20 mg delayed release capsule Take 20 mg by mouth daily in the early morning        ramipril (ALTACE) 10 MG capsule Take 1 capsule (10 mg total) by mouth daily 90 capsule 1    rosuvastatin (CRESTOR) 5 mg tablet Take 1 tablet (5 mg total) by mouth daily 90 tablet 0     No current facility-administered medications for this visit  Allergies   Allergen Reactions    Drug [Diclofenac Sodium] Tongue Swelling    Garlic - Food Allergy Throat Swelling    Latex Anaphylaxis     Latex and Black Rubber    Meperidine Tongue Swelling    Nsaids GI Bleeding     Rectal bleeding    Other Throat Swelling     "Perfumes"    Percocet [Oxycodone-Acetaminophen] Itching     Norco ok     Formaldehyde Other (See Comments)     + Skin Test    Levaquin [Levofloxacin] Other (See Comments)     Achilles tendon pain    Adhesive [Medical Tape] Rash    Penicillins Rash    Sulfa Antibiotics Rash      Immunizations:     Immunization History   Administered Date(s) Administered    COVID-19, unspecified 04/19/2021, 05/10/2021    INFLUENZA 11/05/2017, 11/04/2018, 10/20/2021    Influenza, injectable, quadrivalent, preservative free 0 5 mL 10/19/2020      Health Maintenance:         Topic Date Due    HIV Screening  Never done    Breast Cancer Screening: Mammogram  05/25/2022    Colorectal Cancer Screening  09/22/2025    Hepatitis C Screening  Completed         Topic Date Due    Pneumococcal Vaccine: 65+ Years (1 of 2 - PPSV23) Never done    DTaP,Tdap,and Td Vaccines (1 - Tdap) Never done    COVID-19 Vaccine (3 - Booster) 10/10/2021      Medicare Health Risk Assessment:     /78   Pulse 105   Temp (!) 97 °F (36 1 °C)   Ht 5' 8" (1 727 m)   Wt 93 9 kg (207 lb)   SpO2 97%   BMI 31 47 kg/m²      Abramlynne Desouzaard is here for her Welcome to Medicare visit  Health Risk Assessment:   Patient rates overall health as fair  Patient feels that their physical health rating is same  Patient is satisfied with their life  Eyesight was rated as slightly worse  Hearing was rated as same  Patient feels that their emotional and mental health rating is same  Patients states they are never, rarely angry  Patient states they are never, rarely unusually tired/fatigued  Pain experienced in the last 7 days has been none   Patient states that she has experienced no weight loss or gain in last 6 months  Fall Risk Screening: In the past year, patient has experienced: no history of falling in past year      Urinary Incontinence Screening:   Patient has leaked urine accidently in the last six months  Home Safety:  Patient does not have trouble with stairs inside or outside of their home  Patient has working smoke alarms and has working carbon monoxide detector  Home safety hazards include: none  Nutrition:   Current diet is Regular  Medications:   Patient is currently taking over-the-counter supplements  OTC medications include: see medication list  Patient is able to manage medications  Activities of Daily Living (ADLs)/Instrumental Activities of Daily Living (IADLs):   Walk and transfer into and out of bed and chair?: Yes  Dress and groom yourself?: Yes    Bathe or shower yourself?: Yes    Feed yourself? Yes  Do your laundry/housekeeping?: Yes  Manage your money, pay your bills and track your expenses?: Yes  Make your own meals?: Yes    Do your own shopping?: Yes    Previous Hospitalizations:   Any hospitalizations or ED visits within the last 12 months?: Yes    How many hospitalizations have you had in the last year?: 1-2    Advance Care Planning:   Living will: Yes    Durable POA for healthcare:  Yes    Advanced directive: Yes    End of Life Decisions reviewed with patient: Yes      Cognitive Screening:   Provider or family/friend/caregiver concerned regarding cognition?: No    PREVENTIVE SCREENINGS      Cardiovascular Screening:    General: History Lipid Disorder and Screening Current      Diabetes Screening:     General: Screening Current      Colorectal Cancer Screening:     General: Screening Current      Breast Cancer Screening:     General: Screening Current      Cervical Cancer Screening:    General: Screening Not Indicated      Osteoporosis Screening:      Due for: DXA Appendicular      Abdominal Aortic Aneurysm (AAA) Screening: General: Screening Not Indicated      Lung Cancer Screening:     General: Screening Not Indicated      Hepatitis C Screening:    General: Screening Current    Screening, Brief Intervention, and Referral to Treatment (SBIRT)    Screening  Typical number of drinks in a day: 0  Typical number of drinks in a week: 0  Interpretation: Low risk drinking behavior  Single Item Drug Screening:  How often have you used an illegal drug (including marijuana) or a prescription medication for non-medical reasons in the past year? never    Single Item Drug Screen Score: 0  Interpretation: Negative screen for possible drug use disorder    Other Counseling Topics:   Skin self-exam and regular weightbearing exercise  Constitutional: Negative for appetite change and fatigue  HENT: Negative for congestion, ear pain and postnasal drip  Eyes: Negative for visual disturbance  Respiratory: Negative for cough and shortness of breath  Cardiovascular: Negative for chest pain and leg swelling  Gastrointestinal: Negative for abdominal pain, constipation, diarrhea and nausea  Genitourinary: Negative for dysuria, frequency and urgency  Musculoskeletal: Negative for arthralgias and myalgias  Skin: Negative for rash and wound  Neurological: Negative for dizziness, numbness and headaches  Hematological: Does not bruise/bleed easily  Psychiatric/Behavioral: Negative for confusion  The patient is not nervous/anxious  Physical Exam  Vitals and nursing note reviewed  Constitutional:       General: She is not in acute distress  Appearance: She is well-developed  HENT:      Head: Normocephalic and atraumatic  Eyes:      Pupils: Pupils are equal, round, and reactive to light  Cardiovascular:      Rate and Rhythm: Normal rate and regular rhythm  Heart sounds: Normal heart sounds  Pulmonary:      Effort: Pulmonary effort is normal       Breath sounds: Normal breath sounds  No wheezing  Abdominal:      General: Bowel sounds are normal       Palpations: Abdomen is soft  Musculoskeletal:         General: No swelling  Right lower leg: No edema  Left lower leg: No edema  Skin:     General: Skin is warm  Findings: No rash  Neurological:      General: No focal deficit present  Mental Status: She is alert and oriented to person, place, and time  Psychiatric:         Mood and Affect: Mood and affect normal  Mood is not anxious or depressed           Behavior: Behavior normal        Nahomy Tucker MD

## 2022-04-07 NOTE — PROGRESS NOTES
Assessment/Plan:    Essential hypertension  BP stable, on ramipril  Chronic calculous cholecystitis  S/p cholecystectomy  No issues  Dahl's esophagus  On daily PPI  Other hyperlipidemia  Lipids remain elevated  (+) family hx  She will consider rosuvastatin 5 mg 2 to 3 days a week  Plantar fasciitis, bilateral  No recent issues  Class 1 obesity due to excess calories without serious comorbidity with body mass index (BMI) of 32 0 to 32 9 in adult  No weight change  Elevated hemoglobin (HCC)  Stable  Asthma  No recent symptoms  Prediabetes  A1c stable at 5 9%  Family history of melanoma  Sees dermatology regular  Recent Mohs surgery  Achilles tendinitis of both lower extremities  No recent flare up  Diagnoses and all orders for this visit:    Other hyperlipidemia  -     rosuvastatin (CRESTOR) 5 mg tablet; Take 1 tablet (5 mg total) by mouth daily  -     Comprehensive metabolic panel; Future  -     Lipid panel; Future    Essential hypertension  -     ramipril (ALTACE) 10 MG capsule; Take 1 capsule (10 mg total) by mouth daily  -     CBC and differential; Future  -     TSH, 3rd generation with Free T4 reflex; Future    Allergy, sequela  -     EPINEPHrine (EPIPEN) 0 3 mg/0 3 mL SOAJ; Inject 0 3 mL (0 3 mg total) into a muscle once for 1 dose    Prediabetes  -     Hemoglobin A1C; Future    Fibromyalgia    Family history of melanoma    Plantar fasciitis, bilateral    Postmenopausal  -     DXA bone density spine hip and pelvis; Future    Health maintenance examination  Comments:  Declined Prevnar  Bone density due  Follow up in 6 months or as needed  Subjective:      Patient ID: Kp Ivy is a 74 y o  female here for a follow up  She reports feeling stressed the past few months  She had Mohs surgery on her nose  She had gallbladder surgery, she has completely recovered from this      She does not walk or exercise, reports no recent pain on her foot or heel     She is reluctant to start medication for her cholesterol since it may cause joint or muscle pain or GI symptoms  She reports her whole family has high cholesterol  The following portions of the patient's history were reviewed and updated as appropriate: allergies, current medications, past medical history, past social history and problem list     Review of Systems   Constitutional: Negative for appetite change and fatigue  HENT: Negative for congestion, ear pain and postnasal drip  Eyes: Negative for visual disturbance  Respiratory: Negative for cough and shortness of breath  Cardiovascular: Negative for chest pain and leg swelling  Gastrointestinal: Negative for abdominal pain, constipation, diarrhea and nausea  Genitourinary: Negative for dysuria, frequency and urgency  Musculoskeletal: Negative for arthralgias and myalgias  Skin: Negative for rash and wound  Neurological: Negative for dizziness, numbness and headaches  Hematological: Does not bruise/bleed easily  Psychiatric/Behavioral: Negative for confusion  The patient is not nervous/anxious  Objective:      /78   Pulse 105   Temp (!) 97 °F (36 1 °C)   Ht 5' 8" (1 727 m)   Wt 93 9 kg (207 lb)   SpO2 97%   BMI 31 47 kg/m²          Physical Exam  Vitals and nursing note reviewed  Constitutional:       General: She is not in acute distress  Appearance: She is well-developed  HENT:      Head: Normocephalic and atraumatic  Eyes:      Pupils: Pupils are equal, round, and reactive to light  Cardiovascular:      Rate and Rhythm: Normal rate and regular rhythm  Heart sounds: Normal heart sounds  Pulmonary:      Effort: Pulmonary effort is normal       Breath sounds: Normal breath sounds  No wheezing  Abdominal:      General: Bowel sounds are normal       Palpations: Abdomen is soft  Musculoskeletal:         General: No swelling  Right lower leg: No edema  Left lower leg: No edema  Skin:     General: Skin is warm  Findings: No rash  Neurological:      General: No focal deficit present  Mental Status: She is alert and oriented to person, place, and time  Psychiatric:         Mood and Affect: Mood and affect normal  Mood is not anxious or depressed  Behavior: Behavior normal            Labs & imaging results reviewed with patient

## 2022-06-21 ENCOUNTER — TELEPHONE (OUTPATIENT)
Dept: GASTROENTEROLOGY | Facility: CLINIC | Age: 67
End: 2022-06-21

## 2022-06-21 NOTE — TELEPHONE ENCOUNTER
Patients GI provider:  Dr Andres Jim    Number to return call: [phone]     Reason for call: Pt calling to find out when she is due for her next EGD      Scheduled procedure/appointment date if applicable: N/A

## 2022-09-12 ENCOUNTER — ANNUAL EXAM (OUTPATIENT)
Dept: OBGYN CLINIC | Facility: CLINIC | Age: 67
End: 2022-09-12
Payer: MEDICARE

## 2022-09-12 VITALS
DIASTOLIC BLOOD PRESSURE: 90 MMHG | HEIGHT: 68 IN | BODY MASS INDEX: 30.92 KG/M2 | SYSTOLIC BLOOD PRESSURE: 150 MMHG | WEIGHT: 204 LBS

## 2022-09-12 DIAGNOSIS — Z12.31 ENCOUNTER FOR SCREENING MAMMOGRAM FOR MALIGNANT NEOPLASM OF BREAST: ICD-10-CM

## 2022-09-12 DIAGNOSIS — Z01.419 ENCOUNTER FOR GYNECOLOGICAL EXAMINATION WITHOUT ABNORMAL FINDING: Primary | ICD-10-CM

## 2022-09-12 PROCEDURE — G0101 CA SCREEN;PELVIC/BREAST EXAM: HCPCS | Performed by: OBSTETRICS & GYNECOLOGY

## 2022-09-12 NOTE — PROGRESS NOTES
Subjective      Basim Rowe is a 74 y o   female who presents for annual well woman exam  Patient reports no bleeding, spotting, or discharge  Patient reports Yes  hot flashes/night sweats overall decreased, not sexually active, reports sleeping in different room from  as they both have difficulty sleeping,  No vaginal dryness, sleeping poorly same as prior  Patient is having a difficult year had gallbladder out 2021 still noticing a lot of bloating and general feelings of fullness no connection to dietary intake  Patient also had food poisoning from just peanut butter and has had loose stools since that time sometimes poorly controlled several times a day  Patient also wears pad daily for urinary incontinence  Reviewed local care and protective cream such as Desitin or A&D  Discussed starting with a probiotic and if doing well adding a digestive enzyme and following up with GI    Patient has a upper endoscopy for to follow-up her Dahl's esophagus at the end of the month    Current contraception: abstinence and post menopausal status  History of abnormal Pap smear: no  Family history of uterine or ovarian cancer: no  Regular self breast exam: yes  History of abnormal mammogram: no  Family history of breast cancer: no    Menstrual History:  OB History        3    Para   2    Term   0            AB   1    Living   2       SAB   1    IAB        Ectopic        Multiple        Live Births   2                The following portions of the patient's history were reviewed and updated as appropriate: allergies, current medications, past family history, past medical history, past social history, past surgical history and problem list   Past Medical History:   Diagnosis Date    Asthma     Cough/Allergy Induced    Dalh's esophagus     Basal cell carcinoma 2021    Left nasal ala crease    Closed left ankle fracture     chip    Diverticulitis     Fibromyalgia, primary  GERD (gastroesophageal reflux disease)     Hypertension      Past Surgical History:   Procedure Laterality Date     SECTION  1986     SECTION  1989    COLONOSCOPY      EGD      ELBOW SURGERY Left     cyst removal    MOHS SURGERY Left 09/15/2021    [facility]-Dr vann    [place] LAP,CHOLECYSTECTOMY N/A 12/3/2021    Procedure: LAPAROSCOPIC CHOLECYSTECTOMY;  Surgeon: Jori Haley MD;  [facility];  Service: General    SKIN BIOPSY       OB History        3    Para   2    Term   0            AB   1    Living   2       SAB   1    IAB        Ectopic        Multiple        Live Births   2                 Review of Systems  Review of Systems   Constitutional: Negative  Negative for chills and fever  HENT: Negative  Negative for ear pain and sore throat  Eyes: Negative  Negative for pain and visual disturbance  Respiratory: Negative  Negative for cough and shortness of breath  Cardiovascular: Negative  Negative for chest pain and palpitations  Gastrointestinal: Positive for abdominal pain  Negative for vomiting  Genitourinary: Negative  Negative for dysuria and hematuria  Musculoskeletal: Negative  Negative for arthralgias and back pain  Skin: Negative  Negative for color change and rash  Neurological: Negative  Negative for seizures and syncope  All other systems reviewed and are negative          Objective   Vitals:    22 1044   BP: 150/90   BP Location: Left arm   Patient Position: Sitting   Cuff Size: Standard   Weight: 92 5 kg (204 lb)   Height: 5' 8" (1 727 m)       General:   alert and oriented, in no acute distress   Heart: regular rate and rhythm, S1, S2 normal, no murmur, click, rub or gallop   Lungs: clear to auscultation bilaterally   Abdomen: soft, non-tender, without masses or organomegaly   Vulva: normal   Vagina: normal mucosa   Cervix: no cervical motion tenderness and no lesions   Uterus: normal size, mobile, non-tender Adnexa: normal adnexa and no mass, fullness, tenderness   Breast inspection negative, no nipple discharge or bleeding, no masses or nodularity palpable  Rectal negative, stool guaiac negative  Thyroid normal no masses or nodules     Assessment    72-year-old  here for annual exam   Patient with a lot of digestive symptoms including bloating fullness and loose bowels since had gallbladder out and also food poisoning with Jif peanut butter earlier this year  Encouraged continue follow-up with GI has upper endoscopy coming up at end of month    Normal Pap 2020, has DEXA and mammogram scheduled also for end of month     Plan   Given slip for next year mammogram discussed that if she has any concerns or symptoms we are always happy to see her otherwise plan for Q 2 year annual exam

## 2022-09-22 ENCOUNTER — TELEPHONE (OUTPATIENT)
Dept: GASTROENTEROLOGY | Facility: CLINIC | Age: 67
End: 2022-09-22

## 2022-09-22 NOTE — TELEPHONE ENCOUNTER
Spoke to pt confirming pt's egd scheduled on 9/29/22 with Dr Gustabo Bravo at [facility]  Informed [facility] would be calling 1-2 days prior with arrival time  Informed would need to be npo after midnight and would need a  due to being under sedation the day of the procedure  Pt has instructions and did not have any questions  Advised pt to contact insurance if has any questions regarding coverage of procedures

## 2022-09-27 ENCOUNTER — HOSPITAL ENCOUNTER (OUTPATIENT)
Dept: RADIOLOGY | Age: 67
Discharge: HOME | End: 2022-09-27
Payer: MEDICARE

## 2022-09-27 VITALS — WEIGHT: 204 LBS | BODY MASS INDEX: 30.92 KG/M2 | HEIGHT: 68 IN

## 2022-09-27 DIAGNOSIS — Z12.31 ENCOUNTER FOR SCREENING MAMMOGRAM FOR MALIGNANT NEOPLASM OF BREAST: ICD-10-CM

## 2022-09-27 DIAGNOSIS — Z78.0 POSTMENOPAUSAL: ICD-10-CM

## 2022-09-27 PROCEDURE — 77067 SCR MAMMO BI INCL CAD: CPT

## 2022-09-27 PROCEDURE — 77080 DXA BONE DENSITY AXIAL: CPT

## 2022-09-27 PROCEDURE — 77063 BREAST TOMOSYNTHESIS BI: CPT

## 2022-09-28 RX ORDER — SODIUM CHLORIDE, SODIUM LACTATE, POTASSIUM CHLORIDE, CALCIUM CHLORIDE 600; 310; 30; 20 MG/100ML; MG/100ML; MG/100ML; MG/100ML
75 INJECTION, SOLUTION INTRAVENOUS CONTINUOUS
Status: CANCELLED | OUTPATIENT
Start: 2022-09-28

## 2022-09-29 ENCOUNTER — ANESTHESIA (OUTPATIENT)
Dept: GASTROENTEROLOGY | Facility: AMBULATORY SURGERY CENTER | Age: 67
End: 2022-09-29

## 2022-09-29 ENCOUNTER — HOSPITAL ENCOUNTER (OUTPATIENT)
Dept: GASTROENTEROLOGY | Facility: AMBULATORY SURGERY CENTER | Age: 67
Discharge: HOME | End: 2022-09-29
Payer: MEDICARE

## 2022-09-29 ENCOUNTER — ANESTHESIA EVENT (OUTPATIENT)
Dept: GASTROENTEROLOGY | Facility: AMBULATORY SURGERY CENTER | Age: 67
End: 2022-09-29

## 2022-09-29 VITALS
SYSTOLIC BLOOD PRESSURE: 118 MMHG | BODY MASS INDEX: 30.31 KG/M2 | DIASTOLIC BLOOD PRESSURE: 73 MMHG | TEMPERATURE: 97.9 F | HEIGHT: 68 IN | RESPIRATION RATE: 18 BRPM | WEIGHT: 200 LBS | OXYGEN SATURATION: 96 % | HEART RATE: 75 BPM

## 2022-09-29 DIAGNOSIS — K21.9 GASTRO-ESOPHAGEAL REFLUX DISEASE WITHOUT ESOPHAGITIS: ICD-10-CM

## 2022-09-29 DIAGNOSIS — K22.70 BARRETT'S ESOPHAGUS WITHOUT DYSPLASIA: ICD-10-CM

## 2022-09-29 PROBLEM — E78.5 HYPERLIPIDEMIA: Status: ACTIVE | Noted: 2022-09-29

## 2022-09-29 PROCEDURE — 43239 EGD BIOPSY SINGLE/MULTIPLE: CPT | Performed by: INTERNAL MEDICINE

## 2022-09-29 PROCEDURE — 43251 EGD REMOVE LESION SNARE: CPT | Performed by: INTERNAL MEDICINE

## 2022-09-29 PROCEDURE — 88305 TISSUE EXAM BY PATHOLOGIST: CPT | Performed by: PATHOLOGY

## 2022-09-29 RX ORDER — PROPOFOL 10 MG/ML
INJECTION, EMULSION INTRAVENOUS AS NEEDED
Status: DISCONTINUED | OUTPATIENT
Start: 2022-09-29 | End: 2022-09-29

## 2022-09-29 RX ORDER — SODIUM CHLORIDE, SODIUM LACTATE, POTASSIUM CHLORIDE, CALCIUM CHLORIDE 600; 310; 30; 20 MG/100ML; MG/100ML; MG/100ML; MG/100ML
75 INJECTION, SOLUTION INTRAVENOUS CONTINUOUS
Status: DISCONTINUED | OUTPATIENT
Start: 2022-09-29 | End: 2022-10-03 | Stop reason: HOSPADM

## 2022-09-29 RX ORDER — SODIUM CHLORIDE, SODIUM LACTATE, POTASSIUM CHLORIDE, CALCIUM CHLORIDE 600; 310; 30; 20 MG/100ML; MG/100ML; MG/100ML; MG/100ML
20 INJECTION, SOLUTION INTRAVENOUS CONTINUOUS
Status: DISCONTINUED | OUTPATIENT
Start: 2022-09-29 | End: 2022-10-03 | Stop reason: HOSPADM

## 2022-09-29 RX ADMIN — SODIUM CHLORIDE, SODIUM LACTATE, POTASSIUM CHLORIDE, CALCIUM CHLORIDE: 600; 310; 30; 20 INJECTION, SOLUTION INTRAVENOUS at 08:01

## 2022-09-29 RX ADMIN — PROPOFOL 30 MG: 10 INJECTION, EMULSION INTRAVENOUS at 08:19

## 2022-09-29 RX ADMIN — PROPOFOL 30 MG: 10 INJECTION, EMULSION INTRAVENOUS at 08:13

## 2022-09-29 RX ADMIN — PROPOFOL 20 MG: 10 INJECTION, EMULSION INTRAVENOUS at 08:15

## 2022-09-29 RX ADMIN — PROPOFOL 20 MG: 10 INJECTION, EMULSION INTRAVENOUS at 08:20

## 2022-09-29 RX ADMIN — PROPOFOL 20 MG: 10 INJECTION, EMULSION INTRAVENOUS at 08:17

## 2022-09-29 RX ADMIN — PROPOFOL 30 MG: 10 INJECTION, EMULSION INTRAVENOUS at 08:23

## 2022-09-29 RX ADMIN — PROPOFOL 150 MG: 10 INJECTION, EMULSION INTRAVENOUS at 08:11

## 2022-09-29 RX ADMIN — PROPOFOL 30 MG: 10 INJECTION, EMULSION INTRAVENOUS at 08:21

## 2022-09-29 NOTE — ANESTHESIA POSTPROCEDURE EVALUATION
Post-Op Assessment Note    CV Status:  Stable  Pain Score: 0    Pain management: adequate     Mental Status:  Alert and awake   Hydration Status:  Stable   PONV Controlled:  None   Airway Patency:  Patent      Post Op Vitals Reviewed: Yes      Staff: CRNA         No complications documented      BP     Temp      Pulse     Resp      SpO2

## 2022-09-29 NOTE — H&P
History and Physical - [facility]  Vijay Moon 74 y o  female MRN: [id number]                  HPI: Vijay Moon is a 74y o  year old female who presents for PERSONAL HISTORY OF GERD AND BRANDT'S      REVIEW OF SYSTEMS: Per the HPI, and otherwise unremarkable      Historical Information   Past Medical History:   Diagnosis Date    Asthma     Cough/Allergy Induced    Brandt's esophagus     Basal cell carcinoma 2021    Left nasal ala crease    Closed left ankle fracture     chip    Diverticulitis     Fibromyalgia, primary     GERD (gastroesophageal reflux disease)     Hypertension      Past Surgical History:   Procedure Laterality Date     SECTION  1986     SECTION  1989    COLONOSCOPY      EGD      ELBOW SURGERY Left     cyst removal    MOHS SURGERY Left 09/15/2021    [facility]-Dr vann    [place] LAP,CHOLECYSTECTOMY N/A 12/3/2021    Procedure: LAPAROSCOPIC CHOLECYSTECTOMY;  Surgeon: Orene Koyanagi, MD;  Location: [facility];  Service: General    SKIN BIOPSY       Social History   Social History     Substance and Sexual Activity   Alcohol Use Never    Comment: Rare Socially     Social History     Substance and Sexual Activity   Drug Use Never     Social History     Tobacco Use   Smoking Status Never Smoker   Smokeless Tobacco Never Used     Family History   Problem Relation Age of Onset    Diabetes Mother 79    Hypertension Mother     Heart disease Mother     Prostate cancer Father     Diabetes Father 79    Hypertension Father     Heart disease Father 61    Stroke Father     Melanoma Sister     Brandt's esophagus Sister     No Known Problems Daughter     No Known Problems Maternal Grandmother     Colon cancer Maternal Grandfather [de-identified]    No Known Problems Paternal Grandmother     No Known Problems Paternal Grandfather     Hyperlipidemia Brother     Melanoma Brother     Asthma Brother     Lung disease Brother     Brandt's esophagus Brother     Cancer Brother 76        head&neck cancer    Dahl's esophagus Brother     Hyperlipidemia Brother     Dahl's esophagus Brother     No Known Problems Maternal Aunt     No Known Problems Maternal Aunt     No Known Problems Maternal Aunt     No Known Problems Son     Alcohol abuse Neg Hx     Substance Abuse Neg Hx     Mental illness Neg Hx     Depression Neg Hx        Meds/Allergies       Current Outpatient Medications:     aspirin 81 mg chewable tablet    Cholecalciferol (Vitamin D3) 50 MCG (2000 UT) TABS    Multiple Vitamins-Minerals (CENTRUM ADULTS PO)    omeprazole (PriLOSEC) 20 mg delayed release capsule    ramipril (ALTACE) 10 MG capsule    EPINEPHrine (EPIPEN) 0 3 mg/0 3 mL SOAJ    rosuvastatin (CRESTOR) 5 mg tablet    Current Facility-Administered Medications:     lactated ringers infusion, 75 mL/hr, Intravenous, Continuous, Continue from Pre-op at 09/29/22 0808    Allergies   Allergen Reactions    Drug [Diclofenac Sodium] Tongue Swelling    Garlic - Food Allergy Throat Swelling    Latex Anaphylaxis     Latex and Black Rubber    Meperidine Tongue Swelling    Nsaids GI Bleeding     Rectal bleeding    Other Throat Swelling     "Perfumes"    Percocet [Oxycodone-Acetaminophen] Itching     Norco ok     Formaldehyde Other (See Comments)     + Skin Test    Levaquin [Levofloxacin] Other (See Comments)     Achilles tendon pain    Adhesive [Medical Tape] Rash    Penicillins Rash    Sulfa Antibiotics Rash       Objective     BP (!) 187/84   Pulse 89   Temp 97 9 °F (36 6 °C) (Skin)   Resp 18   Ht 5' 8" (1 727 m)   Wt 90 7 kg (200 lb)   SpO2 98%   BMI 30 41 kg/m²       PHYSICAL EXAM    Gen: NAD  Head: NCAT  CV: RRR  CHEST: Clear  ABD: soft, NT/ND  EXT: no edema      ASSESSMENT/PLAN:  This is a 74y o  year old female here for EGD, and she is stable and optimized for her procedure

## 2022-09-29 NOTE — ANESTHESIA PREPROCEDURE EVALUATION
Procedure:  EGD    Relevant Problems   CARDIO   (+) Essential hypertension   (+) Hyperlipidemia (no meds)      GI/HEPATIC   (+) GERD (gastroesophageal reflux disease)      MUSCULOSKELETAL   (+) Fibromyalgia      NEURO/PSYCH   (+) Fibromyalgia   (+) Hx of adenomatous colonic polyps      PULMONARY   (+) Asthma      Digestive   (+) Dahl's esophagus        Physical Exam    Airway    Mallampati score: II  TM Distance: >3 FB  Neck ROM: full     Dental   lower dentures,     Cardiovascular  Rhythm: regular, Rate: normal,     Pulmonary  Breath sounds clear to auscultation,     Other Findings  Partial lower denture      Anesthesia Plan  ASA Score- 2     Anesthesia Type- IV sedation with anesthesia with ASA Monitors  Additional Monitors:   Airway Plan:           Plan Factors-    Chart reviewed  Patient is not a current smoker  Induction- intravenous  Postoperative Plan-     Informed Consent- Anesthetic plan and risks discussed with patient  I personally reviewed this patient with the CRNA  Discussed and agreed on the Anesthesia Plan with the CRNA  Dara Soulier

## 2022-10-03 ENCOUNTER — RA CDI HCC (OUTPATIENT)
Dept: OTHER | Facility: HOSPITAL | Age: 67
End: 2022-10-03

## 2022-10-06 LAB — HBA1C MFR BLD HPLC: 6 %

## 2022-10-07 NOTE — RESULT ENCOUNTER NOTE
Please call the patient regarding her abnormal result  Biopsy show some inflammation  Repeat EGD in 3 years to follow up on the biopsies    Follow up in my office as needed

## 2022-10-10 ENCOUNTER — OFFICE VISIT (OUTPATIENT)
Dept: INTERNAL MEDICINE CLINIC | Facility: CLINIC | Age: 67
End: 2022-10-10
Payer: MEDICARE

## 2022-10-10 VITALS
TEMPERATURE: 96.8 F | HEART RATE: 97 BPM | BODY MASS INDEX: 30.58 KG/M2 | OXYGEN SATURATION: 99 % | WEIGHT: 201.8 LBS | HEIGHT: 68 IN | DIASTOLIC BLOOD PRESSURE: 98 MMHG | SYSTOLIC BLOOD PRESSURE: 138 MMHG

## 2022-10-10 DIAGNOSIS — R73.03 PREDIABETES: ICD-10-CM

## 2022-10-10 DIAGNOSIS — K22.70 BARRETT'S ESOPHAGUS WITHOUT DYSPLASIA: ICD-10-CM

## 2022-10-10 DIAGNOSIS — E78.2 MIXED HYPERLIPIDEMIA: ICD-10-CM

## 2022-10-10 DIAGNOSIS — Z79.899 ENCOUNTER FOR LONG-TERM CURRENT USE OF MEDICATION: ICD-10-CM

## 2022-10-10 DIAGNOSIS — T78.40XS ALLERGY, SEQUELA: ICD-10-CM

## 2022-10-10 DIAGNOSIS — Z71.9 HEALTH COUNSELING: ICD-10-CM

## 2022-10-10 DIAGNOSIS — I10 ESSENTIAL HYPERTENSION: ICD-10-CM

## 2022-10-10 DIAGNOSIS — R19.8 CHANGE IN BOWEL MOVEMENT: Primary | ICD-10-CM

## 2022-10-10 DIAGNOSIS — D58.2 ELEVATED HEMOGLOBIN (HCC): ICD-10-CM

## 2022-10-10 DIAGNOSIS — Z23 NEED FOR INFLUENZA VACCINATION: ICD-10-CM

## 2022-10-10 DIAGNOSIS — M81.0 AGE-RELATED OSTEOPOROSIS WITHOUT CURRENT PATHOLOGICAL FRACTURE: ICD-10-CM

## 2022-10-10 PROBLEM — N18.2 CHRONIC KIDNEY DISEASE (CKD), STAGE II (MILD): Status: ACTIVE | Noted: 2022-10-10

## 2022-10-10 PROCEDURE — 90662 IIV NO PRSV INCREASED AG IM: CPT | Performed by: INTERNAL MEDICINE

## 2022-10-10 PROCEDURE — 99214 OFFICE O/P EST MOD 30 MIN: CPT | Performed by: INTERNAL MEDICINE

## 2022-10-10 PROCEDURE — G0008 ADMIN INFLUENZA VIRUS VAC: HCPCS | Performed by: INTERNAL MEDICINE

## 2022-10-10 RX ORDER — EPINEPHRINE 0.3 MG/.3ML
0.3 INJECTION SUBCUTANEOUS ONCE
Qty: 0.6 ML | Refills: 0 | Status: SHIPPED | OUTPATIENT
Start: 2022-10-10 | End: 2022-10-10

## 2022-10-10 RX ORDER — RAMIPRIL 10 MG/1
10 CAPSULE ORAL DAILY
Qty: 90 CAPSULE | Refills: 1 | Status: SHIPPED | OUTPATIENT
Start: 2022-10-10

## 2022-10-10 NOTE — ASSESSMENT & PLAN NOTE
Reviewed bone density  Discussed medication, recommend starting alendronate  She cannot tolerate oral calcium, discussed diet   Continue daily D3

## 2022-10-10 NOTE — ASSESSMENT & PLAN NOTE
Lipids worse  (+) family hx  Did not take rosuvastatin, discussed other medication options  She will consider trying low dose rosuvastatin 2 to 3 days a week  Already on a low fat diet

## 2022-10-10 NOTE — PATIENT INSTRUCTIONS
Calcium and Osteoporosis   WHAT YOU NEED TO KNOW:   Why is calcium important for osteoporosis? Calcium is important for osteoporosis because calcium helps build bone mass  Osteoporosis is a long-term medical condition that causes your body to break down more bone than it makes  Your bones become weak, brittle, and more likely to fracture  How much calcium do I need? Women:      19 to 50 years: 1,000 mg    Over 50: 1,200 mg    Pregnant or breastfeeding, 19 to 50 years: 1,000 mg    Men:      19 to 70: 1,000 mg    Over 70: 1,200 mg    Which foods are high in calcium? The following list shows the number of calcium milligrams (mg) per serving  Your healthcare provider or dietitian can help you create a balanced meal plan for your calcium needs  Dairy:      1 cup of low-fat plain yogurt (415 mg) or low-fat fruit yogurt (245 to 384 mg)    1½ ounces of shredded cheddar cheese (306 mg) or part skim mozzarella cheese (275 mg)    1 cup of skim, 2%, or whole milk (300 mg)    1 cup of cottage cheese made with 2% milk fat (138 mg)    ½ cup of frozen yogurt (103 mg)    Other foods:      1 cup of calcium-fortified orange juice (300 mg)    ½ cup of cooked dung greens (220 mg)    4 canned sardines, with bones (242 mg)    ½ cup of tofu (with added calcium) (204 mg)       How can I get extra calcium? Add powdered milk to puddings, cocoa, custard, or hot cereal     Sift powdered milk into flour when you make cakes, cookies, or breads  Use low-fat or fat-free milk instead of water in pancake mix, mashed potatoes, pudding, or hot breakfast cereal     Add low-fat or fat-free cheese to salad, soup, or pasta  Add tofu (with added calcium) to vegetable stir-[place]  Take calcium supplements if you cannot get enough calcium from the foods you eat  Your body can absorb the most calcium from supplements when you take 500 mg or less at one time  Do not take more than 2,500 mg of calcium supplements each day      CARE AGREEMENT: You have the right to help plan your care  Discuss treatment options with your healthcare provider to decide what care you want to receive  You always have the right to refuse treatment  The above information is an  only  It is not intended as medical advice for individual conditions or treatments  Talk to your doctor, nurse or pharmacist before following any medical regimen to see if it is safe and effective for you  © Copyright "[employer] 2022 Information is for End User's use only and may not be sold, redistributed or otherwise used for commercial purposes   All illustrations and images included in CareNotes® are the copyrighted property of [employer]  or [address] [place] [address]

## 2022-10-14 ENCOUNTER — TELEPHONE (OUTPATIENT)
Dept: OTHER | Facility: OTHER | Age: 67
End: 2022-10-14

## 2022-10-14 NOTE — TELEPHONE ENCOUNTER
Patient is calling because she would like her appt moved earlier to see Dr Mila Bearden; her appt is for 12/1/22  She says she been having some issues she would like to discuss with him  Please call patient

## 2022-10-25 ENCOUNTER — OFFICE VISIT (OUTPATIENT)
Dept: GASTROENTEROLOGY | Facility: CLINIC | Age: 67
End: 2022-10-25
Payer: MEDICARE

## 2022-10-25 VITALS
SYSTOLIC BLOOD PRESSURE: 155 MMHG | DIASTOLIC BLOOD PRESSURE: 94 MMHG | WEIGHT: 201 LBS | HEART RATE: 89 BPM | HEIGHT: 68 IN | BODY MASS INDEX: 30.46 KG/M2

## 2022-10-25 DIAGNOSIS — K21.00 GASTROESOPHAGEAL REFLUX DISEASE WITH ESOPHAGITIS WITHOUT HEMORRHAGE: Primary | ICD-10-CM

## 2022-10-25 DIAGNOSIS — Z90.49 S/P CHOLECYSTECTOMY: ICD-10-CM

## 2022-10-25 DIAGNOSIS — K57.90 DIVERTICULOSIS: ICD-10-CM

## 2022-10-25 DIAGNOSIS — Z80.8 FAMILY HISTORY OF CANCER OF TONGUE: ICD-10-CM

## 2022-10-25 DIAGNOSIS — R14.0 BLOATING: ICD-10-CM

## 2022-10-25 DIAGNOSIS — R05.9 COUGH, UNSPECIFIED TYPE: ICD-10-CM

## 2022-10-25 DIAGNOSIS — K22.70 BARRETT'S ESOPHAGUS WITHOUT DYSPLASIA: ICD-10-CM

## 2022-10-25 DIAGNOSIS — K44.9 HIATAL HERNIA: ICD-10-CM

## 2022-10-25 PROCEDURE — 99214 OFFICE O/P EST MOD 30 MIN: CPT | Performed by: INTERNAL MEDICINE

## 2022-10-25 RX ORDER — ROSUVASTATIN CALCIUM 5 MG/1
5 TABLET, COATED ORAL DAILY
COMMUNITY
Start: 2022-10-12

## 2022-10-25 NOTE — PROGRESS NOTES
[facility] - Outpatient Follow-up Note  Manny Lacy 74 y o  female MRN: [id number]  Encounter: [id number]          ASSESSMENT AND PLAN:      1  Gastroesophageal reflux disease with esophagitis without hemorrhage    2  Dahl's esophagus without dysplasia    3  Diverticulosis    4  Bloating    5  Hiatal hernia    6  Cough, unspecified type    7  S/P cholecystectomy    8  Family history of cancer of tongue      Patient with history of GERD, hiatal hernia, cough, anti-reflux measures reviewed diet discussed, she takes low-dose PPI with symptomatic relief  Recent EGD biopsy results again discussed  Personal history of diverticulosis, colon polyp, brother has history of tongue cancer, bowel management reviewed  She will call us as needed  Otherwise due for a follow-up EGD colonoscopy in October of 2025     ______________________________________________________________________    SUBJECTIVE:     Patient came in for follow-up and multiple questions about her GI symptoms, she had an EGD done a month ago showed some reflux, biopsies were taken for intestinal metaplasia though were negative  Patient has some heartburn, cough, indigestion appetite is fair weight stable, bowel sometimes irregular no apparent blood  Denies any fever chills rash, she is quite anxious with her her brother's recent diagnosis of advanced malignancy of the tongue, also she takes care of her mother, reasonably active  Diet medications more than 10 systems reviewed, denies any chest pain shortness of breath melena hematochezia GI bleeding  REVIEW OF SYSTEMS IS OTHERWISE NEGATIVE        Historical Information   Past Medical History:   Diagnosis Date   • Allergic    • Asthma     Cough/Allergy Induced   • Dahl's esophagus    • Basal cell carcinoma 07/08/2021    Left nasal ala crease   • Closed left ankle fracture     chip   • Diverticulitis    • Fibromyalgia, primary    • GERD (gastroesophageal reflux disease)    • Hypertension    • Otitis media    • Scoliosis    • Urinary tract infection    • Visual impairment May 2021    Retina specialist seen     Past Surgical History:   Procedure Laterality Date   •  SECTION  1986   •  SECTION  1989   • CHOLECYSTECTOMY  Dec  2021   • COLONOSCOPY     • EGD     • ELBOW SURGERY Left     cyst removal   • MOHS SURGERY Left 09/15/2021    [facility]-Dr vann   • IN LAP,CHOLECYSTECTOMY N/A 2021    Procedure: LAPAROSCOPIC CHOLECYSTECTOMY;  Surgeon: Reyna Harp MD;  Location[facility];  Service: General   • SKIN BIOPSY       Social History   Social History     Substance and Sexual Activity   Alcohol Use Never    Comment: Rare Socially     Social History     Substance and Sexual Activity   Drug Use Never     Social History     Tobacco Use   Smoking Status Never Smoker   Smokeless Tobacco Never Used     Family History   Problem Relation Age of Onset   • Diabetes Mother 79   • Hypertension Mother    • Heart disease Mother    • Prostate cancer Father    • Diabetes Father 79   • Hypertension Father    • Heart disease Father 61   • Stroke Father    • Melanoma Sister    • Dahl's esophagus Sister    • Asthma Daughter    • No Known Problems Maternal Grandmother    • Colon cancer Maternal Grandfather [de-identified]   • Asthma Maternal Grandfather    • Asthma Paternal Grandmother    • No Known Problems Paternal Grandfather    • Hyperlipidemia Brother    • Melanoma Brother    • Asthma Brother    • Lung disease Brother    • Dahl's esophagus Brother    • Cancer Brother 76        Oral   • Dahl's esophagus Brother    • Hyperlipidemia Brother    • Dahl's esophagus Brother    • Hypertension Brother    • No Known Problems Maternal Aunt    • No Known Problems Maternal Aunt    • No Known Problems Maternal Aunt    • No Known Problems Son    • Hypertension Sister    • Hyperlipidemia Sister    • Alcohol abuse Neg Hx    • Substance Abuse Neg Hx    • Mental illness Neg Hx • Depression Neg Hx        Meds/Allergies       Current Outpatient Medications:   •  aspirin 81 mg chewable tablet  •  Cholecalciferol (Vitamin D3) 50 MCG (2000 UT) TABS  •  Multiple Vitamins-Minerals (CENTRUM ADULTS PO)  •  omeprazole (PriLOSEC) 20 mg delayed release capsule  •  ramipril (ALTACE) 10 MG capsule  •  EPINEPHrine (EPIPEN) 0 3 mg/0 3 mL SOAJ  •  rosuvastatin (CRESTOR) 5 mg tablet    Allergies   Allergen Reactions   • Drug [Diclofenac Sodium] Tongue Swelling   • Garlic - Food Allergy Throat Swelling   • Latex Anaphylaxis     Latex and Black Rubber   • Meperidine Tongue Swelling   • Nsaids GI Bleeding     Rectal bleeding   • Other Throat Swelling     "Perfumes"   • Percocet [Oxycodone-Acetaminophen] Itching     Norco ok    • Formaldehyde Other (See Comments)     + Skin Test   • Levaquin [Levofloxacin] Other (See Comments)     Achilles tendon pain   • Adhesive [Medical Tape] Rash   • Penicillins Rash   • Sulfa Antibiotics Rash           Objective     Blood pressure 155/94, pulse 89, height 5' 8" (1 727 m), weight 91 2 kg (201 lb)  Body mass index is 30 56 kg/m²  PHYSICAL EXAM:      General Appearance:   Alert, cooperative, no distress   HEENT:   Normocephalic, atraumatic, anicteric  Neck:  Supple, symmetrical, trachea midline   Lungs:   Clear to auscultation bilaterally; no rales, rhonchi or wheezing; respirations unlabored    Heart[de-identified]   Regular rate and rhythm; no murmur  Abdomen:   Soft, non-tender, non-distended; normal bowel sounds; no masses, no organomegaly    Genitalia:   Deferred    Rectal:   Deferred    Extremities:  No cyanosis, clubbing or edema    Skin:  No jaundice, rashes, or lesions    Lymph nodes:  No palpable cervical lymphadenopathy        Lab Results:   No visits with results within 1 Day(s) from this visit     Latest known visit with results is:   Orders Only on 10/06/2022   Component Date Value   • Hemoglobin A1C 10/06/2022 6 0          Radiology Results:   EGD    Result Date: 9/29/2022  Narrative: [place] [id number] [phone] DATE OF SERVICE: 9/29/22 PHYSICIAN(S): Attending: Rukhsana Leon MD Fellow: No Staff Documented INDICATION: Gastro-esophageal reflux disease without esophagitis, Dahl's esophagus without dysplasia POST-OP DIAGNOSIS: See the impression below  PREPROCEDURE: Informed consent was obtained for the procedure, including sedation  Risks of perforation, hemorrhage, adverse drug reaction and aspiration were discussed  The patient was placed in the left lateral decubitus position  Patient was explained about the risks and benefits of the procedure  Risks including but not limited to bleeding, infection, and perforation were explained in detail  Also explained about less than 100% sensitivity with the exam and other alternatives  DETAILS OF PROCEDURE: Patient was taken to the procedure room where a time out was performed to confirm correct patient and correct procedure  The patient underwent monitored anesthesia care, which was administered by an anesthesia professional  The patient's blood pressure, heart rate, level of consciousness, respirations and oxygen were monitored throughout the procedure  The scope was advanced to the second part of the duodenum  Retroflexion was performed in the fundus  The patient experienced no blood loss  The procedure was not difficult  The patient tolerated the procedure well  There were no apparent complications   ANESTHESIA INFORMATION: ASA: II Anesthesia Type: IV Sedation with Anesthesia MEDICATIONS: No administrations occurring from 0807 to 0824 on 09/29/22 FINDINGS: Three sessile polyps measuring 5-9 mm in the body of the stomach and greater curve of the stomach; completely removed en bloc by cold snare and retrieved specimen [id number] Dahl's esophagus observed where the Z-line is 37 cm from the incisors and top of gastric folds are 37 cm from the incisors with no associated nodule; narrow band imaging (NBI) used; performed 10 cold forceps biopsies for dysplasia screening Small sliding hiatal hernia (type I hiatal hernia) - GE junction 37 cm from the incisors, diaphragmatic impression 39 cm from the incisors SPECIMENS: * No specimens in log *     Impression: 1  Small hiatal hernia and distal esophageal erythema suggestive of Dahl's, about 30 mm segment as described above  RECOMMENDATION: Await pathology results Schedule repeat EGD for repeat dysplasia screening in 3 years  Anti-reflux measures discussed  Side effects of long-term acid suppression/PPI use discussed  Fransisco iLu MD     DXA bone density spine hip and pelvis    Result Date: 9/27/2022  Narrative: DXA SCAN CLINICAL HISTORY:  62-year-old female  Menopause at age 54  OTHER RISK FACTORS:  PPI therapy  PHARMACOLOGIC THERAPY FOR OSTEOPOROSIS:  None  TECHNIQUE: Bone densitometry was performed using a [employer] Horizon A  bone densitometer  Regions of interest appear properly placed  COMPARISON: There are no prior DXA studies performed on this unit for comparison  RESULTS: LUMBAR SPINE Level: L1-L4 : BMD:  0 772  gm/cm2 T-score: -2 5 LEFT  TOTAL HIP: BMD:  0 788  gm/cm2 T-score:  -1 3 LEFT  FEMORAL NECK: BMD:  0 623  gm/cm2 T score: -2 0     Impression: 1  Osteoporosis  [Based on the lumbar spine] 2  The 10 year risk of hip fracture is 1 6% with the 10 year risk of major osteoporotic fracture being 10% as calculated by the [facility]/WHO fracture risk assessment tool (FRAX)  3   The current NOF guidelines recommend treating patients with a T-score of -2 5 or less in the lumbar spine or hips, or in post-menopausal women and men over the age of 48 with low bone mass (osteopenia) and a FRAX 10 year risk score of >3% for hip fracture and/or >20% for major osteoporotic fracture  4   The NOF recommends follow-up DXA in 1-2 years after initiating therapy for osteoporosis and every 2 years thereafter  More frequent evaluation is appropriate for patients with conditions associated with rapid bone loss, such as glucocorticoid therapy  The interval between DXA screenings may be longer for individuals without major risk factors and initial T-score in the normal or upper low bone mass range  The FRAX algorithm has certain limitations: -FRAX has not been validated in patients currently or previously treated with pharmacotherapy for osteoporosis  In such patients, clinical judgment must be exercised in interpreting FRAX scores  -Prior hip, vertebral and humeral fragility fractures appear to confer greater risk of subsequent fracture than fractures at other sites (this is especially true for individuals with severe vertebral fractures), but quantification of this incremental risk is not possible with FRAX  -FRAX underestimates fracture risk in patients with history of multiple fragility fractures  -FRAX may underestimate fracture risk in patients with history of frequent falls  -It is not appropriate to use FRAX to monitor treatment response  WHO CLASSIFICATION: Normal (a T-score of -1 0 or higher) Low bone mineral density (a T-score of less than -1 0 but higher than -2 5) Osteoporosis (a T-score of -2 5 or less) Severe osteoporosis (a T-score of -2 5 or less with a fragility fracture) Workstation performed: [id number]     Mammo screening bilateral w 3d & cad    Result Date: 9/28/2022  Narrative: DIAGNOSIS: Encounter for screening mammogram for malignant neoplasm of breast TECHNIQUE: Digital screening mammography was performed  Computer Aided Detection (CAD) analyzed all applicable images  COMPARISONS: Prior breast imaging dated: 05/25/2021, 11/12/2019, 10/09/2017, 12/14/2015, 12/11/2014, 12/02/2013, and 11/28/2012 RELEVANT HISTORY: Family Breast Cancer History: No known family history of breast cancer  Family Medical History: Family medical history includes colon cancer in maternal grandfather   Personal History: No known relevant hormone history  No known relevant surgical history  No known relevant medical history  The patient is scheduled in a reminder system for screening mammography  8-10% of cancers will be missed on mammography  Management of a palpable abnormality must be based on clinical grounds  Patients will be notified of their results via letter from our facility  Accredited by [employer] of Rad[employer] and FDA  RISK ASSESSMENT: 5 Year Tyrer-Cuzick: 1 24 % 10 Year Tyrer-Cuzick: 2 3 % Lifetime Tyrer-Cuzick: 4 84 % TISSUE DENSITY: The breasts are almost entirely fatty  INDICATION: Reena Vázquez is a 74 y o  female presenting for screening mammography  FINDINGS: There are no suspicious masses, grouped microcalcifications or areas of architectural distortion  The skin and nipple areolar complex are unremarkable  Impression: No mammographic evidence of malignancy  ASSESSMENT/BI-RADS CATEGORY: Left: 1 - Negative Right: 1 - Negative Overall: 1 - Negative RECOMMENDATION:      - Routine screening mammogram in 1 year for both breasts   Workstation ID: [id number]

## 2022-11-25 ENCOUNTER — TELEPHONE (OUTPATIENT)
Dept: GASTROENTEROLOGY | Facility: CLINIC | Age: 67
End: 2022-11-25

## 2022-11-25 DIAGNOSIS — R19.7 DIARRHEA, UNSPECIFIED TYPE: Primary | ICD-10-CM

## 2022-11-25 NOTE — TELEPHONE ENCOUNTER
Patients GI provider:  Dr Jennie Castellanos    Number to return call: [phone]     Reason for call: Pt called and stated she has been having stomach pains and problems with her bowels  Please reach out to patient thank you     Scheduled procedure/appointment date if applicable: Appt 33/89/2827

## 2022-11-25 NOTE — TELEPHONE ENCOUNTER
Spoke with patient  History of GERD, barrettes esophagus, diverticulitis, bloating, hiatal hernia, s/p cholecystectomy  Patient c/o generalized abdominal bloating, 5-6 BM daily soft formed to watery diarrhea  Taking omeprazole 20mg daily  Denies nausea, vomiting  Fever, chills, SOB weakness, black or bloody stools  Patient will follow-up with stool studies  OV is schedule for January

## 2022-11-28 ENCOUNTER — APPOINTMENT (OUTPATIENT)
Dept: LAB | Facility: CLINIC | Age: 67
End: 2022-11-28

## 2022-11-28 DIAGNOSIS — R19.7 DIARRHEA, UNSPECIFIED TYPE: ICD-10-CM

## 2022-11-29 LAB
C DIFF TOX B TCDB STL QL NAA+PROBE: NEGATIVE
C DIFF TOX GENS STL QL NAA+PROBE: NEGATIVE
CAMPYLOBACTER DNA SPEC NAA+PROBE: NORMAL
SALMONELLA DNA SPEC QL NAA+PROBE: NORMAL
SHIGA TOXIN STX GENE SPEC NAA+PROBE: NORMAL
SHIGELLA DNA SPEC QL NAA+PROBE: NORMAL

## 2022-11-30 LAB — G LAMBLIA AG STL QL IA: NEGATIVE

## 2023-01-05 ENCOUNTER — OFFICE VISIT (OUTPATIENT)
Dept: GASTROENTEROLOGY | Facility: CLINIC | Age: 68
End: 2023-01-05

## 2023-01-05 VITALS
SYSTOLIC BLOOD PRESSURE: 172 MMHG | HEIGHT: 68 IN | DIASTOLIC BLOOD PRESSURE: 101 MMHG | BODY MASS INDEX: 31.31 KG/M2 | WEIGHT: 206.6 LBS | HEART RATE: 89 BPM

## 2023-01-05 DIAGNOSIS — K21.00 GASTROESOPHAGEAL REFLUX DISEASE WITH ESOPHAGITIS WITHOUT HEMORRHAGE: Primary | ICD-10-CM

## 2023-01-05 DIAGNOSIS — K57.90 DIVERTICULOSIS: ICD-10-CM

## 2023-01-05 DIAGNOSIS — R10.11 RIGHT UPPER QUADRANT PAIN: ICD-10-CM

## 2023-01-05 DIAGNOSIS — K22.70 BARRETT'S ESOPHAGUS WITHOUT DYSPLASIA: ICD-10-CM

## 2023-01-05 DIAGNOSIS — R19.7 DIARRHEA, UNSPECIFIED TYPE: ICD-10-CM

## 2023-01-05 NOTE — PROGRESS NOTES
[facility] - Outpatient Follow-up Note  Orin Boswell 74 y o  female MRN: [id number]  Encounter: [id number]          ASSESSMENT AND PLAN:      1  Gastroesophageal reflux disease with esophagitis without hemorrhage    2  Dahl's esophagus without dysplasia    3  Diverticulosis    4  Diarrhea, unspecified type    5  Right upper quadrant pain      History of GERD, and diarrhea diverticulosis, recently had episode of diarrhea and stool studies negative, diarrhea is resolved, no further evaluation needed for the same  Has mild discomfort right upper quadrant, more at the rib margin, most likely musculoskeletal pain, has had gallbladder surgery done  Medically no evidence of acute abdomen ileus obstruction  EGD from the past is evaluation noted  Symptomatic management of musculoskeletal pain  Somewhat anxious and upset about not being able to talk to me however office staff, he is not happy with talking to call center   ______________________________________________________________________    SUBJECTIVE:     Patient came in for follow-up and evaluation of her history of diarrhea, acid reflux and diverticulosis  Couple months ago she had episode of diarrhea, was by stool studies by on-call nurse, she had stool for C  difficile culture or all unrevealing  Her diarrhea has resolved  She does have history of heartburn acid reflux indigestion takes PPI, denies dysphagia coughing choking spells GI bleeding  Denies any chest pain shortness of breath fever chills rash  Is somewhat anxious  Was not happy because she felt her office staff and health call team was not not that responsive and she could not get her answers for quite some time  Medications more than 10 pertinent systems reviewed  REVIEW OF SYSTEMS IS OTHERWISE NEGATIVE        Historical Information   Past Medical History:   Diagnosis Date   • Allergic    • Asthma     Cough/Allergy Induced   • Dahl esophagus    • Adhl's esophagus    • Basal cell carcinoma 2021    Left nasal ala crease   • Closed left ankle fracture     chip   • Diverticulitis    • Fibromyalgia, primary    • GERD (gastroesophageal reflux disease)    • Hypertension    • Otitis media    • Scoliosis    • Urinary tract infection    • Visual impairment May 2021    Retina specialist seen     Past Surgical History:   Procedure Laterality Date   • BAND HEMORRHOIDECTOMY     •  SECTION  1986   •  SECTION  1989   • CHOLECYSTECTOMY  Dec  2021   • COLONOSCOPY     • EGD     • ELBOW SURGERY Left     cyst removal   • MOHS SURGERY Left 09/15/2021    [facility]-Dr vann   • AZ LAPAROSCOPY SURG CHOLECYSTECTOMY N/A 2021    Procedure: LAPAROSCOPIC CHOLECYSTECTOMY;  Surgeon: Jonh Rendon MD;  Location: [facility];  Service: General   • SKIN BIOPSY     • UPPER GASTROINTESTINAL ENDOSCOPY       Social History   Social History     Substance and Sexual Activity   Alcohol Use Never    Comment: Rare Socially     Social History     Substance and Sexual Activity   Drug Use Never     Social History     Tobacco Use   Smoking Status Never   Smokeless Tobacco Never     Family History   Problem Relation Age of Onset   • Diabetes Mother 79   • Hypertension Mother    • Heart disease Mother    • Prostate cancer Father    • Diabetes Father 79   • Hypertension Father    • Heart disease Father 61   • Stroke Father    • Melanoma Sister    • Dahl's esophagus Sister    • Asthma Daughter    • No Known Problems Maternal Grandmother    • Colon cancer Maternal Grandfather [de-identified]   • Asthma Maternal Grandfather    • Asthma Paternal Grandmother    • No Known Problems Paternal Grandfather    • Hyperlipidemia Brother    • Melanoma Brother    • Asthma Brother    • Lung disease Brother    • Dahl's esophagus Brother    • Cancer Brother 76        Oral   • Dahl's esophagus Brother    • Hyperlipidemia Brother    • Dahl's esophagus Brother    • Hypertension Brother • No Known Problems Maternal Aunt    • No Known Problems Maternal Aunt    • No Known Problems Maternal Aunt    • No Known Problems Son    • Hypertension Sister    • Hyperlipidemia Sister    • Alcohol abuse Neg Hx    • Substance Abuse Neg Hx    • Mental illness Neg Hx    • Depression Neg Hx        Meds/Allergies       Current Outpatient Medications:   •  aspirin 81 mg chewable tablet  •  Cholecalciferol (Vitamin D3) 50 MCG (2000 UT) TABS  •  EPINEPHrine (EPIPEN) 0 3 mg/0 3 mL SOAJ  •  Multiple Vitamins-Minerals (CENTRUM ADULTS PO)  •  omeprazole (PriLOSEC) 20 mg delayed release capsule  •  ramipril (ALTACE) 10 MG capsule  •  rosuvastatin (CRESTOR) 5 mg tablet    Allergies   Allergen Reactions   • Drug [Diclofenac Sodium] Tongue Swelling   • Garlic - Food Allergy Throat Swelling   • Latex Anaphylaxis     Latex and Black Rubber   • Meperidine Tongue Swelling   • Nsaids GI Bleeding     Rectal bleeding   • Other Throat Swelling     "Perfumes"   • Percocet [Oxycodone-Acetaminophen] Itching     Norco ok    • Formaldehyde Other (See Comments)     + Skin Test   • Levaquin [Levofloxacin] Other (See Comments)     Achilles tendon pain   • Adhesive [Medical Tape] Rash   • Penicillins Rash   • Sulfa Antibiotics Rash           Objective     Blood pressure (!) 172/101, pulse 89, height 5' 8" (1 727 m), weight 93 7 kg (206 lb 9 6 oz)  Body mass index is 31 41 kg/m²  PHYSICAL EXAM:      General Appearance:   Alert, cooperative, no distress   HEENT:   Normocephalic, atraumatic, anicteric  Neck:  Supple, symmetrical, trachea midline   Lungs:   Clear to auscultation bilaterally; no rales, rhonchi or wheezing; respirations unlabored    Heart[de-identified]   Regular rate and rhythm; no murmur     Abdomen:   Soft, non-tender, non-distended; normal bowel sounds; no masses, no organomegaly    Genitalia:   Deferred    Rectal:   Deferred    Extremities:  No cyanosis, clubbing or edema    Skin:  No jaundice, rashes, or lesions    Lymph nodes:  No palpable cervical lymphadenopathy        Lab Results:   No visits with results within 1 Day(s) from this visit  Latest known visit with results is:   Appointment on 11/28/2022   Component Date Value   • C difficile toxin by PCR 11/28/2022 Negative    •  C difficile toxin by PC* 11/28/2022 Negative    • Salmonella sp PCR 11/28/2022 None Detected    • Shigella sp/Enteroinvasi* 11/28/2022 None Detected    • Campylobacter sp (jejuni* 11/28/2022 None Detected    • Shiga toxin 1/Shiga toxi* 11/28/2022 None Detected    • Giardia Ag, Stl 11/28/2022 Negative          Radiology Results:   No results found

## 2023-03-06 ENCOUNTER — RA CDI HCC (OUTPATIENT)
Dept: OTHER | Facility: HOSPITAL | Age: 68
End: 2023-03-06

## 2023-03-09 ENCOUNTER — APPOINTMENT (OUTPATIENT)
Dept: LAB | Facility: CLINIC | Age: 68
End: 2023-03-09

## 2023-03-09 DIAGNOSIS — E78.2 MIXED HYPERLIPIDEMIA: ICD-10-CM

## 2023-03-09 DIAGNOSIS — M81.0 AGE-RELATED OSTEOPOROSIS WITHOUT CURRENT PATHOLOGICAL FRACTURE: ICD-10-CM

## 2023-03-09 DIAGNOSIS — R73.03 PREDIABETES: ICD-10-CM

## 2023-03-09 DIAGNOSIS — D58.2 ELEVATED HEMOGLOBIN (HCC): ICD-10-CM

## 2023-03-09 DIAGNOSIS — Z79.899 ENCOUNTER FOR LONG-TERM CURRENT USE OF MEDICATION: ICD-10-CM

## 2023-03-09 LAB
25(OH)D3 SERPL-MCNC: 39.2 NG/ML (ref 30–100)
ALBUMIN SERPL BCP-MCNC: 4.5 G/DL (ref 3.5–5)
ALP SERPL-CCNC: 71 U/L (ref 46–116)
ALT SERPL W P-5'-P-CCNC: 37 U/L (ref 12–78)
ANION GAP SERPL CALCULATED.3IONS-SCNC: 6 MMOL/L (ref 4–13)
AST SERPL W P-5'-P-CCNC: 24 U/L (ref 5–45)
BASOPHILS # BLD AUTO: 0.04 THOUSANDS/ÂΜL (ref 0–0.1)
BASOPHILS NFR BLD AUTO: 1 % (ref 0–1)
BILIRUB SERPL-MCNC: 0.63 MG/DL (ref 0.2–1)
BUN SERPL-MCNC: 19 MG/DL (ref 5–25)
CALCIUM SERPL-MCNC: 9.9 MG/DL (ref 8.3–10.1)
CHLORIDE SERPL-SCNC: 103 MMOL/L (ref 96–108)
CHOLEST SERPL-MCNC: 264 MG/DL
CO2 SERPL-SCNC: 25 MMOL/L (ref 21–32)
CREAT SERPL-MCNC: 0.96 MG/DL (ref 0.6–1.3)
EOSINOPHIL # BLD AUTO: 0.05 THOUSAND/ÂΜL (ref 0–0.61)
EOSINOPHIL NFR BLD AUTO: 1 % (ref 0–6)
ERYTHROCYTE [DISTWIDTH] IN BLOOD BY AUTOMATED COUNT: 13.2 % (ref 11.6–15.1)
GFR SERPL CREATININE-BSD FRML MDRD: 61 ML/MIN/1.73SQ M
GLUCOSE P FAST SERPL-MCNC: 113 MG/DL (ref 65–99)
HCT VFR BLD AUTO: 48.1 % (ref 34.8–46.1)
HDLC SERPL-MCNC: 59 MG/DL
HGB BLD-MCNC: 15.7 G/DL (ref 11.5–15.4)
IMM GRANULOCYTES # BLD AUTO: 0.02 THOUSAND/UL (ref 0–0.2)
IMM GRANULOCYTES NFR BLD AUTO: 0 % (ref 0–2)
LDLC SERPL CALC-MCNC: 162 MG/DL (ref 0–100)
LYMPHOCYTES # BLD AUTO: 2.4 THOUSANDS/ÂΜL (ref 0.6–4.47)
LYMPHOCYTES NFR BLD AUTO: 34 % (ref 14–44)
MCH RBC QN AUTO: 30.1 PG (ref 26.8–34.3)
MCHC RBC AUTO-ENTMCNC: 32.6 G/DL (ref 31.4–37.4)
MCV RBC AUTO: 92 FL (ref 82–98)
MONOCYTES # BLD AUTO: 0.62 THOUSAND/ÂΜL (ref 0.17–1.22)
MONOCYTES NFR BLD AUTO: 9 % (ref 4–12)
NEUTROPHILS # BLD AUTO: 3.94 THOUSANDS/ÂΜL (ref 1.85–7.62)
NEUTS SEG NFR BLD AUTO: 55 % (ref 43–75)
NONHDLC SERPL-MCNC: 205 MG/DL
NRBC BLD AUTO-RTO: 0 /100 WBCS
PLATELET # BLD AUTO: 383 THOUSANDS/UL (ref 149–390)
PMV BLD AUTO: 10.6 FL (ref 8.9–12.7)
POTASSIUM SERPL-SCNC: 3.9 MMOL/L (ref 3.5–5.3)
PROT SERPL-MCNC: 8 G/DL (ref 6.4–8.4)
RBC # BLD AUTO: 5.22 MILLION/UL (ref 3.81–5.12)
SODIUM SERPL-SCNC: 134 MMOL/L (ref 135–147)
TRIGL SERPL-MCNC: 214 MG/DL
VIT B12 SERPL-MCNC: 535 PG/ML (ref 100–900)
WBC # BLD AUTO: 7.07 THOUSAND/UL (ref 4.31–10.16)

## 2023-03-10 LAB
EST. AVERAGE GLUCOSE BLD GHB EST-MCNC: 123 MG/DL
HBA1C MFR BLD: 5.9 %

## 2023-03-13 ENCOUNTER — OFFICE VISIT (OUTPATIENT)
Dept: INTERNAL MEDICINE CLINIC | Facility: CLINIC | Age: 68
End: 2023-03-13

## 2023-03-13 VITALS
SYSTOLIC BLOOD PRESSURE: 170 MMHG | HEART RATE: 112 BPM | HEIGHT: 68 IN | WEIGHT: 203 LBS | BODY MASS INDEX: 30.77 KG/M2 | DIASTOLIC BLOOD PRESSURE: 86 MMHG | OXYGEN SATURATION: 97 % | TEMPERATURE: 97.4 F

## 2023-03-13 DIAGNOSIS — K22.70 BARRETT'S ESOPHAGUS WITHOUT DYSPLASIA: ICD-10-CM

## 2023-03-13 DIAGNOSIS — I10 ESSENTIAL HYPERTENSION: ICD-10-CM

## 2023-03-13 DIAGNOSIS — N18.2 CHRONIC KIDNEY DISEASE (CKD), STAGE II (MILD): ICD-10-CM

## 2023-03-13 DIAGNOSIS — D58.2 ELEVATED HEMOGLOBIN (HCC): ICD-10-CM

## 2023-03-13 DIAGNOSIS — E78.2 MIXED HYPERLIPIDEMIA: Primary | ICD-10-CM

## 2023-03-13 DIAGNOSIS — Z71.9 HEALTH COUNSELING: ICD-10-CM

## 2023-03-13 DIAGNOSIS — M81.0 AGE-RELATED OSTEOPOROSIS WITHOUT CURRENT PATHOLOGICAL FRACTURE: ICD-10-CM

## 2023-03-13 DIAGNOSIS — R73.03 PREDIABETES: ICD-10-CM

## 2023-03-13 DIAGNOSIS — F43.9 STRESS: ICD-10-CM

## 2023-03-13 DIAGNOSIS — R19.8 CHANGE IN BOWEL MOVEMENT: ICD-10-CM

## 2023-03-13 DIAGNOSIS — E66.09 CLASS 1 OBESITY DUE TO EXCESS CALORIES WITHOUT SERIOUS COMORBIDITY WITH BODY MASS INDEX (BMI) OF 32.0 TO 32.9 IN ADULT: ICD-10-CM

## 2023-03-13 DIAGNOSIS — F41.1 GENERALIZED ANXIETY DISORDER: ICD-10-CM

## 2023-03-13 PROBLEM — K80.10 CHRONIC CALCULOUS CHOLECYSTITIS: Status: RESOLVED | Noted: 2021-10-20 | Resolved: 2023-03-13

## 2023-03-13 RX ORDER — LORAZEPAM 0.5 MG/1
TABLET ORAL
Qty: 30 TABLET | Refills: 0 | Status: SHIPPED | OUTPATIENT
Start: 2023-03-13

## 2023-03-13 RX ORDER — RAMIPRIL 10 MG/1
CAPSULE ORAL
Qty: 90 CAPSULE | Refills: 0 | Status: SHIPPED | OUTPATIENT
Start: 2023-03-13

## 2023-03-13 RX ORDER — ATORVASTATIN CALCIUM 20 MG/1
20 TABLET, FILM COATED ORAL DAILY
Qty: 30 TABLET | Refills: 0 | Status: SHIPPED | OUTPATIENT
Start: 2023-03-13

## 2023-03-13 NOTE — ASSESSMENT & PLAN NOTE
Lab Results   Component Value Date    EGFR 61 03/09/2023    EGFR 63 06/21/2019    CREATININE 0 96 03/09/2023    CREATININE 0 97 06/21/2019     Stable  No NSAIDs

## 2023-03-13 NOTE — PROGRESS NOTES
Assessment/Plan:    Essential hypertension  BP elevated, recommend to increase ramipril  She will monitor BP at home  Hyperlipidemia  Lipids remain elevated  Did not take rosuvastatin, agrees to try atorvastatin instead  Dahl's esophagus  Taking PPI daily  Saw GI  Age-related osteoporosis without current pathological fracture  Declined alendronate  Continue daily D3  Encouraged to walk daily  Elevated hemoglobin (HCC)  Hgb remains slightly elevated  Chronic kidney disease (CKD), stage II (mild)  Lab Results   Component Value Date    EGFR 61 03/09/2023    EGFR 63 06/21/2019    CREATININE 0 96 03/09/2023    CREATININE 0 97 06/21/2019     Stable  No NSAIDs  Prediabetes  A1c stable at 5 9%  Class 1 obesity due to excess calories without serious comorbidity with body mass index (BMI) of 32 0 to 32 9 in adult  Weight stable  Generalized anxiety disorder  Recommend daily medication, previously on paroxetine which she did not like, also took buspirone  Discussed starting low dose Lexapro  PDMP reviewed  Given lorazepam to take prn  Reviewed relaxation techniques  Stress  As above  Family history of melanoma  Sees dermatology  Diagnoses and all orders for this visit:    Mixed hyperlipidemia  -     Lipid panel; Future  -     TSH, 3rd generation with Free T4 reflex; Future  -     atorvastatin (LIPITOR) 20 mg tablet; Take 1 tablet (20 mg total) by mouth daily    Generalized anxiety disorder  -     LORazepam (ATIVAN) 0 5 mg tablet; Take 1/2 to 1 tab PO daily prn for anxiety    Chronic kidney disease (CKD), stage II (mild)  -     CBC and differential; Future  -     Comprehensive metabolic panel; Future    Age-related osteoporosis without current pathological fracture    Dahl's esophagus without dysplasia    Prediabetes  -     Hemoglobin A1C; Future    Change in bowel movement  Comments:  Normal colonoscopy, saw GI  Suspect GI symptoms due to anxiety      Essential Ventricular Rate : 83  Atrial Rate : 83  P-R Interval : 140  QRS Duration : 78  Q-T Interval : 400  QTC Calculation(Bazett) : 470  P Axis : 69  R Axis : 46  T Axis : 49  Diagnosis : Normal sinus rhythm  Normal ECG  No previous ECGs available  Confirmed by Deshawn Tineo ([id number]) on 9/26/2020 2:35:55 PM   hypertension    Elevated hemoglobin (HCC)    Class 1 obesity due to excess calories without serious comorbidity with body mass index (BMI) of 32 0 to 32 9 in adult    Stress    Health counseling  Comments:  Declined Prevnar today  Follow up in 6 months or as needed  Subjective:      Patient ID: Tommie Villasenor is a 79 y o  female here for a follow up  She is feeling alright  She is worried about her brother who was diagnosed with cancer  Her son recently has a 'freckle' in his eye, worried it is cancer  Her mother had Ladonna Silva last fall, she took care of her  She will be moving out of state soon  She remains very stressed all the time, thinking and caring for her family  She feels she needs help, previously on Paxil and Buspar which she did not like  She feels Ativan may help  She feel her stomach is not as bad, feels her stomach has not been the same since her gallbladder surgery  She did see GI  She reports BP this morning was high but she was "worked up " Other home BP readings have been within normal  No chest pain, palpitations, headaches or dizziness  She takes her medication regularly  She would like to try atorvastatin instead of Crestor  The following portions of the patient's history were reviewed and updated as appropriate: allergies, current medications, past medical history, past social history and problem list     Review of Systems   Constitutional: Negative for activity change, appetite change and fatigue  HENT: Negative for congestion and ear pain  Eyes: Negative for visual disturbance  Respiratory: Negative for cough and shortness of breath  Cardiovascular: Negative for chest pain and palpitations  Gastrointestinal: Positive for abdominal pain  Negative for abdominal distention, constipation, diarrhea and nausea  Genitourinary: Negative for dysuria and frequency  Musculoskeletal: Negative for arthralgias  Skin: Negative for rash and wound     Neurological: Negative for dizziness, numbness and headaches  Hematological: Does not bruise/bleed easily  Psychiatric/Behavioral: Positive for sleep disturbance  Negative for confusion  The patient is nervous/anxious  Objective:      /86   Pulse (!) 112   Temp (!) 97 4 °F (36 3 °C)   Ht 5' 8" (1 727 m)   Wt 92 1 kg (203 lb)   SpO2 97%   BMI 30 87 kg/m²          Physical Exam  Vitals and nursing note reviewed  Constitutional:       General: She is not in acute distress  Appearance: She is well-developed  HENT:      Head: Normocephalic and atraumatic  Eyes:      Pupils: Pupils are equal, round, and reactive to light  Cardiovascular:      Rate and Rhythm: Normal rate and regular rhythm  Heart sounds: Normal heart sounds  Pulmonary:      Effort: Pulmonary effort is normal       Breath sounds: Normal breath sounds  No wheezing  Abdominal:      General: Bowel sounds are normal       Palpations: Abdomen is soft  Musculoskeletal:         General: No swelling  Right lower leg: No edema  Left lower leg: No edema  Skin:     General: Skin is warm  Findings: No rash  Neurological:      General: No focal deficit present  Mental Status: She is alert and oriented to person, place, and time  Psychiatric:         Mood and Affect: Affect normal  Mood is anxious  Mood is not depressed  Speech: Speech normal          Behavior: Behavior normal            Labs & imaging results reviewed with patient

## 2023-03-13 NOTE — ASSESSMENT & PLAN NOTE
Recommend daily medication, previously on paroxetine which she did not like, also took buspirone  Discussed starting low dose Lexapro  PDMP reviewed  Given lorazepam to take prn  Reviewed relaxation techniques

## 2023-03-13 NOTE — PATIENT INSTRUCTIONS
Relaxation and Meditation   WHAT YOU NEED TO KNOW:   Relaxation and meditation can help decrease pain, stress, and anxiety  Relaxation and meditation also help regulate your breathing and decrease your blood pressure and heartbeat  Types of relaxation:   Slow, deep breathing  can help relax your body and mind  Deep breathing can be done at any time  Progressive muscle relaxation  decreases tense muscles  With this therapy, you relax certain muscle groups until your entire body is relaxed  Start at one end of your body and move to the other end, such as from your feet to your head  Autogenic training, or self-control relaxation , helps increase the blood flow to your limbs and helps you sleep  With this therapy, you try to replace painful or uncomfortable thoughts and feelings with pleasant ones  Guided imagery  uses your imagination to help you feel peaceful and calm  You try to see, hear, smell, and taste things that you picture in your mind  For example, you might imagine lying on a beach, feeling the sand, and hearing the waves  Distraction  uses activities you enjoy to help you take your mind off of pain, stress, or anxiety  Distraction may include, listening to music, painting, reading a book, or exercise  Types of meditation:  Meditation is a mind exercise that helps to relax your body and free your mind of worry  You sit quietly in a comfortable position, close your eyes, and relax your muscles  Your thoughts are relaxed while your body stays alert  Meditation can be done alone or with other people  Mantra meditation  is when you think or speak a certain word or phrase over and over  The word or phrase often has a smooth sound  The mantra is used as a way to help you focus  The sound is believed to make vibrations that have different effects on people  Mindfulness meditation  is when you focus on what is happening in your life at that point in time   You become aware of your thoughts and feelings in the present without making any judgment  You learn not to worry about your past and future  © Copyright Roque Maurice 2022 Information is for End User's use only and may not be sold, redistributed or otherwise used for commercial purposes  The above information is an  only  It is not intended as medical advice for individual conditions or treatments  Talk to your doctor, nurse or pharmacist before following any medical regimen to see if it is safe and effective for you

## 2023-05-19 ENCOUNTER — EVALUATION (OUTPATIENT)
Dept: PHYSICAL THERAPY | Facility: CLINIC | Age: 68
End: 2023-05-19

## 2023-05-19 DIAGNOSIS — M67.88 PERONEAL TENDINOSIS, LEFT: ICD-10-CM

## 2023-05-19 DIAGNOSIS — M67.88 ACHILLES TENDINOSIS OF BOTH LOWER EXTREMITIES: ICD-10-CM

## 2023-05-19 DIAGNOSIS — M67.88 PERONEAL TENDINOSIS, RIGHT: ICD-10-CM

## 2023-05-19 DIAGNOSIS — M79.671 FOOT PAIN, BILATERAL: Primary | ICD-10-CM

## 2023-05-19 DIAGNOSIS — M79.672 FOOT PAIN, BILATERAL: Primary | ICD-10-CM

## 2023-05-19 NOTE — PROGRESS NOTES
PT Evaluation     Today's date: 2023  Patient name: Rosalino Jeffers  : 1955  MRN: [id number]  Referring provider: Roby Schulte DPM  Dx:   Encounter Diagnosis     ICD-10-CM    1  Foot pain, bilateral  M79 671     M79 672       2  Achilles tendinosis of both lower extremities  M67 88       3  Peroneal tendinosis, left  M67 88       4  Peroneal tendinosis, right  M67 88           Start Time:   Stop Time: 830  Total time in clinic (min): 40 minutes    Assessment  Assessment details: Rosalino Jeffers is a pleasant 79 y o  female who presents with chronic bilateral foot pain dating back to 200  Aside from pain, patient experiences paresthesias in bilateral feet, with potential lumbar etiology  The patient's greatest concerns are the pain she is experiencing, concern at no signs of improvement, wanting to avoid painkillers, wanting to avoid surgery, fear of not being able to keep active and future ill health (and wanting to prevent it)  Patient would benefit from further consultation from pain management pending response to conservative treatment via PT given her chronic, ongoing history of symptoms with minimal relief  Primary movement impairment diagnosis of bilateral foot/ankle hypomobility, poor balance/proprioception, strength deficits and motor control deficits resulting in pathoanatomical symptoms of pain and decreased endurance and limiting her ability to exercise or recreation, perform household chores, perform yard work, stand and walk without limitation  Primary Impairments:  1) bilateral foot/ankle hypomobility  2) poor balance/proprioception  3) strength deficits  4) motor control deficits    Etiologic factors include none recalled by the patient      Impairments: abnormal gait, abnormal muscle firing, abnormal or restricted ROM, activity intolerance, impaired balance, impaired physical strength, lacks appropriate home exercise program, pain with function and weight-bearing "intolerance    Symptom irritability: highUnderstanding of Dx/Px/POC: fair   Prognosis: fair  Prognosis details: Positive prognostic indicators include absence of observed red flags  Negative prognostic indicators include chronicity of symptoms, anxiety, high symptom irritability, fibromyalgia, multiple concurrent orthopedic problems and multiple prior failed treatments  Goals  (STG) Impairment Goals 4-6 weeks  - Decrease pain to ,<5/10 at baseline with activity  - Improve ankle AROM by 5* in all deficient planes  - Increase ankle strength to 4+/5 throughout      (LTG) Functional Goals 6-8 weeks  - Return to Prior Level of Function  - Increase Functional Status Measure (FOTO) to: predicted outcome  - Patient will be independent with HEP  - Patient will be able to perform household chores with only mild increased pain/compensation/difficulty  - Patient will be able to walk/stand for >30 minutes without increased pain/compensation/difficulty   - Improve SLS to 20\" bilaterally        Plan  Patient would benefit from: skilled physical therapy  Planned modality interventions: Modalities PRN  Planned therapy interventions: activity modification, manual therapy, neuromuscular re-education, patient education, therapeutic activities, therapeutic exercise, graded activity, home exercise program, behavior modification, self care, balance, balance/weight bearing training, body mechanics training, flexibility, functional ROM exercises, gait training, stretching, strengthening and joint mobilization  Frequency: 2x week  Duration in weeks: 8  Treatment plan discussed with: patient        Subjective Evaluation    History of Present Illness  Mechanism of injury: WORK/SCHOOL: not working  HOBBIES/EXERCISE: On her feet all day doing housework, yardwork  PLOF: Long chronic history of bilateral foot pain   HISTORY OF CURRENT INJURY: Patient reports having trouble with her feet since 1990   Both of her achilles tendons are bothering " "her  Notes some swelling at her ankles  Also reports having plantar fasciitis \"forever\"  L is worse than right  Often feels like there is pressure at her feet  Feels like she is able to walk as much as she needs to because she just pushes through it, but finds that she is limping around  She has been given stretches by her podiatrist but has not done them daily  PAIN LOCATION/DESCRIPTORS: achilles tendons, just distal to medial malleoli, lateral calf region (peroneal muscles and tendons), not so much at plantar surface of feet  Described as aching   AGGRAVATING FACTORS: \"everything\"   EASES: ice and wearing a compression sleeve    DAY PATTERN: best in the morning when she wakes up, worse with activity and being on her feet   IMAGING: see chart    SPECIAL QUESTIONS: reports occasional numbness/tingling her feet  Does have a history of back pain  Connor Latif denies a new onset of Bladder incontinence, Bowel dysfunction, Recent unexplained weight loss, Tingling, Numbness and Saddle anesthesia   PATIENT GOALS: \"That I can walk better with less fatigue in my feet\"     Pain  Current pain ratin  At best pain ratin  At worst pain ratin  Quality: pressure, sharp and discomfort  Relieving factors: relaxation, rest and ice  Aggravating factors: walking and standing  Progression: worsening    Patient Goals  Patient goals for therapy: decreased pain  Patient goal: Improved tolerance for walking         Objective     Observations   Left Ankle/Foot   Positive for atrophy  Right Ankle/Foot   Positive for atrophy  Palpation   Left   Tenderness of the peroneus  Right   Tenderness of the peroneus  Tenderness   Left Ankle/Foot   Tenderness in the Achilles insertion, anterior ankle and peroneal tendon  No tenderness in the plantar fascia  Right Ankle/Foot   Tenderness in the Achilles insertion, anterior ankle and peroneal tendon  No tenderness in the plantar fascia       Neurological Testing " "    Sensation     Ankle/Foot   Left Ankle/Foot   Paresthesia: light touch    Right Ankle/Foot   Paresthesia: light touch    Active Range of Motion   Left Ankle/Foot   Dorsiflexion (ke): 5 degrees   Plantar flexion: 55 degrees   Inversion: 16 degrees   Eversion: 5 degrees     Right Ankle/Foot   Dorsiflexion (ke): 5 degrees   Plantar flexion: 51 degrees   Inversion: 30 degrees   Eversion: 10 degrees     Joint Play   Left Ankle/Foot  Hypomobile in the talocrural joint and subtalar joint  Right Ankle/Foot  Hypomobile in the talocrural joint and subtalar joint  Strength/Myotome Testing     Left Ankle/Foot   Dorsiflexion: 4+  Plantar flexion: 4  Inversion: 4  Eversion: 4    Right Ankle/Foot   Dorsiflexion: 4+  Plantar flexion: 4  Inversion: 4  Eversion: 4    Tests   Left Ankle/Foot   Negative for windlass  Right Ankle/Foot   Negative for windlass       Additional Tests Details  SLS: 8\" ea LE before LOB             Diagnosis: Bilateral Foot Pain (achilles/peroneal tendinosis)    Precautions: potential lumbar etiology -- fibromyalgia, chronic pain, raynauds, hx of LBP   Primary Goals: ankle ROM, strength, balance, improve tolerance for walking   *asterisks by exercise = given for HEP   Manuals        Ankle PROM/mobs        EPAT                                There Ex        Bike        Gastroc stretch        Soleus stretch        Ankle ABC's        [place] board        Ankle TB 4-way        Seated HR/TR        sidestepping        Standing hip 3-way        Neuro Re-Ed        Toe yoga        Arch lifts         Frog pickup        Wobble board        Tandem stance                                                                 Re-evaluation              Ther Act                                         Modalities                                                        "

## 2023-05-19 NOTE — LETTER
May 19, 2023    Bandar Muhammad DPM  [address] [facility]    Patient: Ramona Pollard   YOB: 1955   Date of Visit: 2023     Encounter Diagnosis     ICD-10-CM    1  Foot pain, bilateral  M79 671     M79 672       2  Achilles tendinosis of both lower extremities  M67 88       3  Peroneal tendinosis, left  M67 88       4  Peroneal tendinosis, right  M67 88           Dear Dr Keshawn Donaldson: Thank you for your recent referral of Ramona Pollard  Please review the attached evaluation summary from [facility] recent visit  Please verify that you agree with the plan of care by signing the attached order  If you have any questions or concerns, please do not hesitate to call  I sincerely appreciate the opportunity to share in the care of one of your patients and hope to have another opportunity to work with you in the near future  Sincerely,    Dashawn Nugent, PT      Referring Provider:      I certify that I have read the below Plan of Care and certify the need for these services furnished under this plan of treatment while under my care  Emilee Cardenas [facility] [address]  Via Fax: [phone]          PT Evaluation     Today's date: 2023  Patient name: Ramona Pollard  : 1955  MRN: [id number]  Referring provider: Mal López DPM  Dx:   Encounter Diagnosis     ICD-10-CM    1  Foot pain, bilateral  M79 671     M79 672       2  Achilles tendinosis of both lower extremities  M67 88       3  Peroneal tendinosis, left  M67 88       4  Peroneal tendinosis, right  M67 88           Start Time:   Stop Time: 0830  Total time in clinic (min): 40 minutes    Assessment  Assessment details: Ramona Pollard is a pleasant 79 y o  female who presents with chronic bilateral foot pain dating back to 200  Aside from pain, patient experiences paresthesias in bilateral feet, with potential lumbar etiology  The patient's greatest concerns are the pain she is experiencing, concern at no signs of improvement, wanting to avoid painkillers, wanting to avoid surgery, fear of not being able to keep active and future ill health (and wanting to prevent it)  Patient would benefit from further consultation from pain management pending response to conservative treatment via PT given her chronic, ongoing history of symptoms with minimal relief  Primary movement impairment diagnosis of bilateral foot/ankle hypomobility, poor balance/proprioception, strength deficits and motor control deficits resulting in pathoanatomical symptoms of pain and decreased endurance and limiting her ability to exercise or recreation, perform household chores, perform yard work, stand and walk without limitation  Primary Impairments:  1) bilateral foot/ankle hypomobility  2) poor balance/proprioception  3) strength deficits  4) motor control deficits    Etiologic factors include none recalled by the patient  Impairments: abnormal gait, abnormal muscle firing, abnormal or restricted ROM, activity intolerance, impaired balance, impaired physical strength, lacks appropriate home exercise program, pain with function and weight-bearing intolerance    Symptom irritability: highUnderstanding of Dx/Px/POC: fair   Prognosis: fair  Prognosis details: Positive prognostic indicators include absence of observed red flags  Negative prognostic indicators include chronicity of symptoms, anxiety, high symptom irritability, fibromyalgia, multiple concurrent orthopedic problems and multiple prior failed treatments        Goals  (STG) Impairment Goals 4-6 weeks  - Decrease pain to ,<5/10 at baseline with activity  - Improve ankle AROM by 5* in all deficient planes  - Increase ankle strength to 4+/5 throughout      (LTG) Functional Goals 6-8 weeks  - Return to Prior Level of Function  - Increase Functional Status Measure (FOTO) to: predicted outcome  - Patient "will be independent with HEP  - Patient will be able to perform household chores with only mild increased pain/compensation/difficulty  - Patient will be able to walk/stand for >30 minutes without increased pain/compensation/difficulty   - Improve SLS to 20\" bilaterally        Plan  Patient would benefit from: skilled physical therapy  Planned modality interventions: Modalities PRN  Planned therapy interventions: activity modification, manual therapy, neuromuscular re-education, patient education, therapeutic activities, therapeutic exercise, graded activity, home exercise program, behavior modification, self care, balance, balance/weight bearing training, body mechanics training, flexibility, functional ROM exercises, gait training, stretching, strengthening and joint mobilization  Frequency: 2x week  Duration in weeks: 8  Treatment plan discussed with: patient        Subjective Evaluation    History of Present Illness  Mechanism of injury: WORK/SCHOOL: not working  HOBBIES/EXERCISE: On her feet all day doing housework, yardwork  PLOF: Long chronic history of bilateral foot pain   HISTORY OF CURRENT INJURY: Patient reports having trouble with her feet since 1990  Both of her achilles tendons are bothering her  Notes some swelling at her ankles  Also reports having plantar fasciitis \"forever\"  L is worse than right  Often feels like there is pressure at her feet  Feels like she is able to walk as much as she needs to because she just pushes through it, but finds that she is limping around  She has been given stretches by her podiatrist but has not done them daily  PAIN LOCATION/DESCRIPTORS: achilles tendons, just distal to medial malleoli, lateral calf region (peroneal muscles and tendons), not so much at plantar surface of feet   Described as aching   AGGRAVATING FACTORS: \"everything\"   EASES: ice and wearing a compression sleeve    DAY PATTERN: best in the morning when she wakes up, worse with activity and being " "on her feet   IMAGING: see chart    SPECIAL QUESTIONS: reports occasional numbness/tingling her feet  Does have a history of back pain  Raenell Ormond denies a new onset of Bladder incontinence, Bowel dysfunction, Recent unexplained weight loss, Tingling, Numbness and Saddle anesthesia   PATIENT GOALS: \"That I can walk better with less fatigue in my feet\"     Pain  Current pain ratin  At best pain ratin  At worst pain ratin  Quality: pressure, sharp and discomfort  Relieving factors: relaxation, rest and ice  Aggravating factors: walking and standing  Progression: worsening    Patient Goals  Patient goals for therapy: decreased pain  Patient goal: Improved tolerance for walking         Objective     Observations   Left Ankle/Foot   Positive for atrophy  Right Ankle/Foot   Positive for atrophy  Palpation   Left   Tenderness of the peroneus  Right   Tenderness of the peroneus  Tenderness   Left Ankle/Foot   Tenderness in the Achilles insertion, anterior ankle and peroneal tendon  No tenderness in the plantar fascia  Right Ankle/Foot   Tenderness in the Achilles insertion, anterior ankle and peroneal tendon  No tenderness in the plantar fascia  Neurological Testing     Sensation     Ankle/Foot   Left Ankle/Foot   Paresthesia: light touch    Right Ankle/Foot   Paresthesia: light touch    Active Range of Motion   Left Ankle/Foot   Dorsiflexion (ke): 5 degrees   Plantar flexion: 55 degrees   Inversion: 16 degrees   Eversion: 5 degrees     Right Ankle/Foot   Dorsiflexion (ke): 5 degrees   Plantar flexion: 51 degrees   Inversion: 30 degrees   Eversion: 10 degrees     Joint Play   Left Ankle/Foot  Hypomobile in the talocrural joint and subtalar joint  Right Ankle/Foot  Hypomobile in the talocrural joint and subtalar joint       Strength/Myotome Testing     Left Ankle/Foot   Dorsiflexion: 4+  Plantar flexion: 4  Inversion: 4  Eversion: 4    Right Ankle/Foot   Dorsiflexion: 4+  Plantar " "flexion: 4  Inversion: 4  Eversion: 4    Tests   Left Ankle/Foot   Negative for windlass  Right Ankle/Foot   Negative for windlass       Additional Tests Details  SLS: 8\" ea LE before LOB            Diagnosis: Bilateral Foot Pain (achilles/peroneal tendinosis)    Precautions: potential lumbar etiology -- fibromyalgia, chronic pain, raynauds, hx of LBP   Primary Goals: ankle ROM, strength, balance, improve tolerance for walking   *asterisks by exercise = given for HEP   Manuals        Ankle PROM/mobs        EPAT                                There Ex        Bike        Gastroc stretch        Soleus stretch        Ankle ABC's        [place] board        Ankle TB 4-way        Seated HR/TR        sidestepping        Standing hip 3-way        Neuro Re-Ed        Toe yoga        Arch lifts         Frog pickup        Wobble board        Tandem stance                                                                 Re-evaluation              Ther Act                                         Modalities                                                                       "

## 2023-05-23 ENCOUNTER — OFFICE VISIT (OUTPATIENT)
Dept: PHYSICAL THERAPY | Facility: CLINIC | Age: 68
End: 2023-05-23

## 2023-05-23 DIAGNOSIS — M67.88 PERONEAL TENDINOSIS, LEFT: ICD-10-CM

## 2023-05-23 DIAGNOSIS — M79.671 FOOT PAIN, BILATERAL: Primary | ICD-10-CM

## 2023-05-23 DIAGNOSIS — M79.672 FOOT PAIN, BILATERAL: Primary | ICD-10-CM

## 2023-05-23 DIAGNOSIS — M67.88 PERONEAL TENDINOSIS, RIGHT: ICD-10-CM

## 2023-05-23 DIAGNOSIS — M67.88 ACHILLES TENDINOSIS OF BOTH LOWER EXTREMITIES: ICD-10-CM

## 2023-05-23 NOTE — PROGRESS NOTES
"Daily Note     Today's date: 2023  Patient name: Yue Brower  : 1955  MRN: [id number]  Referring provider: Roseline Guardado DPM  Dx:   Encounter Diagnosis     ICD-10-CM    1  Foot pain, bilateral  M79 671     M79 672       2  Achilles tendinosis of both lower extremities  M67 88       3  Peroneal tendinosis, left  M67 88       4  Peroneal tendinosis, right  M67 88           Start Time: 0915  Stop Time: 1000  Total time in clinic (min): 45 minutes    Subjective: Patient reports no new updates since her initial evaluation  Objective: See treatment diary below      Assessment: Tolerated treatment well  Patient would benefit from continued PT  Patient tolerated EPAT fairly well this session at a low intensity level  She was educated to avoid ice following treatment  Added in some stretching for her calves and some AROM/proprioception exercises as well  May consider adding HEP next session pending response to treatment today  Educated on post exercise soreness  Will progress as able  Plan: Continue per plan of care  Progress treatment as tolerated         Diagnosis: Bilateral Foot Pain (achilles/peroneal tendinosis)    Precautions: potential lumbar etiology -- fibromyalgia, chronic pain, raynauds, hx of LBP   Primary Goals: ankle ROM, strength, balance, improve tolerance for walking   *asterisks by exercise = given for HEP   Manuals        Ankle PROM/mobs        EPAT White [place], 2x adamaris                                There Ex        Bike        Gastroc stretch 20\" 4x ea       Soleus stretch        Ankle ABC's        [place] board 20x 4 direction, seated       Ankle TB 4-way        Seated HR/TR        sidestepping        Standing hip 3-way        Neuro Re-Ed        Toe yoga        Arch lifts         Frog pickup        Wobble board 1'/1'       Tandem stance                                                                 Re-evaluation              Ther Act                                      " Modalities

## 2023-05-25 ENCOUNTER — OFFICE VISIT (OUTPATIENT)
Dept: PHYSICAL THERAPY | Facility: CLINIC | Age: 68
End: 2023-05-25

## 2023-05-25 DIAGNOSIS — M79.671 FOOT PAIN, BILATERAL: Primary | ICD-10-CM

## 2023-05-25 DIAGNOSIS — M67.88 PERONEAL TENDINOSIS, RIGHT: ICD-10-CM

## 2023-05-25 DIAGNOSIS — M79.672 FOOT PAIN, BILATERAL: Primary | ICD-10-CM

## 2023-05-25 DIAGNOSIS — M67.88 PERONEAL TENDINOSIS, LEFT: ICD-10-CM

## 2023-05-25 DIAGNOSIS — M67.88 ACHILLES TENDINOSIS OF BOTH LOWER EXTREMITIES: ICD-10-CM

## 2023-05-25 NOTE — PROGRESS NOTES
"Daily Note     Today's date: 2023  Patient name: Stephanie Stoddard  : 1955  MRN: [id number]  Referring provider: Rivas Rodrigues DPM  Dx:   Encounter Diagnosis     ICD-10-CM    1  Foot pain, bilateral  M79 671     M79 672       2  Achilles tendinosis of both lower extremities  M67 88       3  Peroneal tendinosis, left  M67 88       4  Peroneal tendinosis, right  M67 88           Start Time: 1400  Stop Time: 1445  Total time in clinic (min): 45 minutes    Subjective: Patient reports that she didn't feel too much soreness following last session  Just has a little soreness at lateral ankle this session  Objective: See treatment diary below      Assessment: Tolerated treatment well  Patient exhibited good technique with therapeutic exercises and would benefit from continued PT  Patient was able to progress through all exercises well this session, with no increase in soreness or discomfort  After initial cues on exercises, was able to maintain good form  Educated on post exercise soreness  She has difficulty with balance exercises and notes she has always struggled with balance  Will continue to utilize EPAT 1x/week in conjunction with strengthening/balance  Plan: Continue per plan of care  Progress treatment as tolerated         Diagnosis: Bilateral Foot Pain (achilles/peroneal tendinosis)    Precautions: potential lumbar etiology -- fibromyalgia, chronic pain, raynauds, hx of LBP   Primary Goals: ankle ROM, strength, balance, improve tolerance for walking   *asterisks by exercise = given for HEP   Manuals       Ankle PROM/mobs        EPAT White [place], 2x adamaris  NV                              There Ex        Bike  5 mins recumbent RPE 4/10      Gastroc stretch 20\" 4x ea 20\" 4x ea      Soleus stretch        Ankle ABC's        [place] board 20x 4 direction, seated 20x 4 direction, seated      Ankle TB 4-way  RTB 20x ea      Seated HR/TR  Green ball 20x ea       sidestepping  YTB at forefeet " "3 laps at int      Standing hip 3-way        Neuro Re-Ed        Toe yoga        Arch lifts         Frog pickup        Wobble board 1'/1' 1'/1'      Tandem stance  45\" 2x ea on foam                                                               Re-evaluation              Ther Act                                         Modalities                                                            "

## 2023-05-31 ENCOUNTER — OFFICE VISIT (OUTPATIENT)
Dept: PHYSICAL THERAPY | Facility: CLINIC | Age: 68
End: 2023-05-31

## 2023-05-31 DIAGNOSIS — M67.88 PERONEAL TENDINOSIS, LEFT: ICD-10-CM

## 2023-05-31 DIAGNOSIS — M79.672 FOOT PAIN, BILATERAL: Primary | ICD-10-CM

## 2023-05-31 DIAGNOSIS — M67.88 PERONEAL TENDINOSIS, RIGHT: ICD-10-CM

## 2023-05-31 DIAGNOSIS — M79.671 FOOT PAIN, BILATERAL: Primary | ICD-10-CM

## 2023-05-31 DIAGNOSIS — M67.88 ACHILLES TENDINOSIS OF BOTH LOWER EXTREMITIES: ICD-10-CM

## 2023-05-31 NOTE — PROGRESS NOTES
"Daily Note     Today's date: 2023  Patient name: Christiano Tucker  : 1955  MRN: [id number]  Referring provider: Yared Yu DPM  Dx:   Encounter Diagnosis     ICD-10-CM    1  Foot pain, bilateral  M79 671     M79 672       2  Achilles tendinosis of both lower extremities  M67 88       3  Peroneal tendinosis, left  M67 88       4  Peroneal tendinosis, right  M67 88           Start Time: 2  Stop Time: 915  Total time in clinic (min): 43 minutes    Subjective: Patient reports that she has been on her feet a lot and doing yard work  Objective: See treatment diary below      Assessment: Tolerated treatment well  Patient demonstrated fatigue post treatment and would benefit from continued PT  Continued with EPAT as outlined, with good tolerance today  Progressed balance/proprioception exercises with good tolerance  Patient had some fatigue post session  Remains challenged by balance exercises but doing well overall  Will continue to progress as tolerated  Plan: Continue per plan of care  Progress treatment as tolerated         Diagnosis: Bilateral Foot Pain (achilles/peroneal tendinosis)    Precautions: potential lumbar etiology -- fibromyalgia, chronic pain, raynauds, hx of LBP   Primary Goals: ankle ROM, strength, balance, improve tolerance for walking   *asterisks by exercise = given for HEP   Manuals      Ankle PROM/mobs        EPAT White head, 2x adamaris  NV White head, 2x adamaris                             There Ex        Bike  5 mins recumbent RPE 4/10      Gastroc stretch 20\" 4x ea 20\" 4x ea 20\" 4x ea     Soleus stretch        Ankle ABC's        [place] board 20x 4 direction, seated 20x 4 direction, seated      Ankle TB 4-way  RTB 20x ea      Seated HR/TR  Green ball 20x ea  Standing HR green 10x     sidestepping  YTB at forefeet 3 laps at plinth      Standing hip 3-way        Neuro Re-Ed        Toe yoga        Arch lifts         Frog pickup        Wobble board 1'/' 1'' " "1'/1'     Tandem stance  45\" 2x ea on foam 45\" 2x ea on foam     Tandem walk on foam   2 long laps at [employer] on foam   2 long laps at [facility]                                              Re-evaluation              Ther Act                                         Modalities                                                              "

## 2023-06-02 ENCOUNTER — OFFICE VISIT (OUTPATIENT)
Dept: PHYSICAL THERAPY | Facility: CLINIC | Age: 68
End: 2023-06-02

## 2023-06-02 DIAGNOSIS — M67.88 ACHILLES TENDINOSIS OF BOTH LOWER EXTREMITIES: ICD-10-CM

## 2023-06-02 DIAGNOSIS — M79.672 FOOT PAIN, BILATERAL: Primary | ICD-10-CM

## 2023-06-02 DIAGNOSIS — M79.671 FOOT PAIN, BILATERAL: Primary | ICD-10-CM

## 2023-06-02 DIAGNOSIS — M67.88 PERONEAL TENDINOSIS, RIGHT: ICD-10-CM

## 2023-06-02 DIAGNOSIS — M67.88 PERONEAL TENDINOSIS, LEFT: ICD-10-CM

## 2023-06-02 NOTE — PROGRESS NOTES
"Daily Note     Today's date: 2023  Patient name: Brisa Liriano  : 1955  MRN: [id number]  Referring provider: Travis Henry DPM  Dx:   Encounter Diagnosis     ICD-10-CM    1  Foot pain, bilateral  M79 671     M79 672       2  Achilles tendinosis of both lower extremities  M67 88       3  Peroneal tendinosis, left  M67 88       4  Peroneal tendinosis, right  M67 88           Start Time: 09  Stop Time: 0503  Total time in clinic (min): 42 minutes    Subjective: Patient reports that she does feel like her ankles are feeling a little better, particularly with swelling  Continues to feel discomfort which feels like joint pain in her bones  Objective: See treatment diary below      Assessment: Tolerated treatment well  Patient demonstrated fatigue post treatment and would benefit from continued PT  Patient was able to progress steadily through all balance and strengthening exercises this session  Balance/proprioception is slowly improving and she had no increase in symptoms during or post session  Updated HEP with 4-way strengthening exercise  Will continue to progress as able  Plan: Continue per plan of care  Progress treatment as tolerated         Diagnosis: Bilateral Foot Pain (achilles/peroneal tendinosis)    Precautions: potential lumbar etiology -- fibromyalgia, chronic pain, raynauds, hx of LBP   Primary Goals: ankle ROM, strength, balance, improve tolerance for walking   *asterisks by exercise = given for HEP   Manuals     Ankle PROM/mobs        EPAT White head, 2x adamaris  NV White head, 2x adamaris NV                            There Ex        Bike  5 mins recumbent RPE 4/10  5 mins recumbent    Gastroc stretch 20\" 4x ea 20\" 4x ea 20\" 4x ea 20\" 4x ea    Soleus stretch        Ankle ABC's        [place] board 20x 4 direction, seated 20x 4 direction, seated      Ankle TB 4-way  RTB 20x ea  RTB 20x ea    Seated HR/TR  Green ball 20x ea  Standing HR green 10x Standing HR/TR " "green 2x10    sidestepping  YTB at forefeet 3 laps at plinth  YTB at forefeet 3 laps at plinth    Standing hip 3-way        Neuro Re-Ed        Toe yoga        Arch lifts         Frog pickup        [place] board 1'/1' 1'/1' 1'/1' 1'/1'    Tandem stance  45\" 2x ea on foam 45\" 2x ea on foam 45\" 2x ea on foam    Tandem walk on foam   2 long laps at mirror 2 long laps at [employer] on foam   2 long laps at mirror 2 long laps at [facility]    Step ups onto bosu    10x ea fwd                             Education    Updated HEP     Re-evaluation              Ther Act                                         Modalities                                                                "

## 2023-06-06 ENCOUNTER — OFFICE VISIT (OUTPATIENT)
Dept: PHYSICAL THERAPY | Facility: CLINIC | Age: 68
End: 2023-06-06
Payer: MEDICARE

## 2023-06-06 DIAGNOSIS — M79.671 FOOT PAIN, BILATERAL: Primary | ICD-10-CM

## 2023-06-06 DIAGNOSIS — M67.88 PERONEAL TENDINOSIS, LEFT: ICD-10-CM

## 2023-06-06 DIAGNOSIS — M79.672 FOOT PAIN, BILATERAL: Primary | ICD-10-CM

## 2023-06-06 DIAGNOSIS — M67.88 PERONEAL TENDINOSIS, RIGHT: ICD-10-CM

## 2023-06-06 DIAGNOSIS — M67.88 ACHILLES TENDINOSIS OF BOTH LOWER EXTREMITIES: ICD-10-CM

## 2023-06-06 PROCEDURE — 97110 THERAPEUTIC EXERCISES: CPT

## 2023-06-06 PROCEDURE — 97140 MANUAL THERAPY 1/> REGIONS: CPT

## 2023-06-06 NOTE — PROGRESS NOTES
"Daily Note     Today's date: 2023  Patient name: Camilo Márquez  : 1955  MRN: [id number]  Referring provider: Raegan Burger DPM  Dx:   Encounter Diagnosis     ICD-10-CM    1  Foot pain, bilateral  M79 671     M79 672       2  Achilles tendinosis of both lower extremities  M67 88       3  Peroneal tendinosis, left  M67 88       4  Peroneal tendinosis, right  M67 88           Start Time: 100  Stop Time: 144  Total time in clinic (min): 44 minutes    Subjective: Patient states she had soreness following her session Friday  She states she feels it may have been from the band when she was stepping sideways but it only lasted through the weekend and then it subsided  She feels she had more swelling on the left this week  Objective: See treatment diary below      Assessment:Continued with outlined program  Trialed IASTM to adamaris achilles/peroneal tendons without exacerbating pain, but noticeable tenderness intermittently  She was able to tolerate E-PAT increasing intensity as able  She performed adamaris stretches following increase blood flow  Focused on manuals this session; will assess symptoms next visit  She states she is unable to notice any improvement following due to tense neck and back as she states she is not good at relaxing  Plan: Progress treatment as tolerated         Diagnosis: Bilateral Foot Pain (achilles/peroneal tendinosis)    Precautions: potential lumbar etiology -- fibromyalgia, chronic pain, raynauds, hx of LBP   Primary Goals: ankle ROM, strength, balance, improve tolerance for walking   *asterisks by exercise = given for HEP   Manuals  6   Ankle PROM/mobs        EPAT White head, 2x adamaris  NV White head, 2x adamaris NV White head x2 adamaris   IASTM      KK, PTA                    There Ex        Bike  5 mins recumbent RPE 4/10  5 mins recumbent 5 min recumbent RPE 3/10   Gastroc stretch 20\" 4x ea 20\" 4x ea 20\" 4x ea 20\" 4x ea 3x30 sec adamaris    Soleus stretch      " "  Ankle ABC's        [place] board 20x 4 direction, seated 20x 4 direction, seated      Ankle TB 4-way  RTB 20x ea  RTB 20x ea    Seated HR/TR  Green ball 20x ea  Standing HR green 10x Standing HR/TR green 2x10    sidestepping  YTB at forefeet 3 laps at plinth  YTB at forefeet 3 laps at plinth    Standing hip 3-way        Neuro Re-Ed        Toe yoga        Arch lifts         Frog pickup        Wobble board 1'/1' 1'/1' 1'/1' 1'/1' 1'/1'   Tandem stance  45\" 2x ea on foam 45\" 2x ea on foam 45\" 2x ea on foam    Tandem walk on foam   2 long laps at mirror 2 long laps at [employer] on foam   2 long laps at mirror 2 long laps at [facility]    Step ups onto bosu    10x ea fwd                             Education    Updated HEP     Re-evaluation              Ther Act                                         Modalities                                                                  "

## 2023-06-09 ENCOUNTER — OFFICE VISIT (OUTPATIENT)
Dept: PHYSICAL THERAPY | Facility: CLINIC | Age: 68
End: 2023-06-09
Payer: MEDICARE

## 2023-06-09 DIAGNOSIS — M67.88 PERONEAL TENDINOSIS, LEFT: ICD-10-CM

## 2023-06-09 DIAGNOSIS — M67.88 ACHILLES TENDINOSIS OF BOTH LOWER EXTREMITIES: ICD-10-CM

## 2023-06-09 DIAGNOSIS — M67.88 PERONEAL TENDINOSIS, RIGHT: ICD-10-CM

## 2023-06-09 DIAGNOSIS — M79.671 FOOT PAIN, BILATERAL: Primary | ICD-10-CM

## 2023-06-09 DIAGNOSIS — M79.672 FOOT PAIN, BILATERAL: Primary | ICD-10-CM

## 2023-06-09 PROCEDURE — 97112 NEUROMUSCULAR REEDUCATION: CPT | Performed by: PHYSICAL THERAPIST

## 2023-06-09 PROCEDURE — 97110 THERAPEUTIC EXERCISES: CPT | Performed by: PHYSICAL THERAPIST

## 2023-06-09 PROCEDURE — 97140 MANUAL THERAPY 1/> REGIONS: CPT | Performed by: PHYSICAL THERAPIST

## 2023-06-09 NOTE — PROGRESS NOTES
"Daily Note     Today's date: 2023  Patient name: Reba Rider  : 1955  MRN: [id number]  Referring provider: Alen Olmso DPM  Dx:   Encounter Diagnosis     ICD-10-CM    1  Foot pain, bilateral  M79 671     M79 672       2  Achilles tendinosis of both lower extremities  M67 88       3  Peroneal tendinosis, left  M67 88       4  Peroneal tendinosis, right  M67 88           Start Time: 1000  Stop Time: 1045  Total time in clinic (min): 45 minutes    Subjective: Patient reports that she feels the IASTM performed at last session helped with the swelling  Objective: See treatment diary below      Assessment: Tolerated treatment well  Patient exhibited good technique with therapeutic exercises and would benefit from continued PT  IASTM was utilized again this session since she found relief with this following last session  Was able to demonstrate some improvements in balance this session  Notes greater difficulty with L LE vs R for balance exercises  Overall demonstrating some improvement  Will continue to progress as tolerated  Plan: Continue per plan of care  Progress treatment as tolerated         Diagnosis: Bilateral Foot Pain (achilles/peroneal tendinosis)    Precautions: potential lumbar etiology -- fibromyalgia, chronic pain, raynauds, hx of LBP   Primary Goals: ankle ROM, strength, balance, improve tolerance for walking   *asterisks by exercise = given for HEP   Manuals    Ankle PROM/mobs        EPAT NV NV White head, 2x adamaris NV White head x2 adamaris   IASTM  LB    KK, PTA                    There Ex        Bike 5 min recumbent 5 mins recumbent RPE 4/10  5 mins recumbent 5 min recumbent RPE 3/10   Gastroc stretch 20\" 4x ea 20\" 4x ea 20\" 4x ea 20\" 4x ea 3x30 sec adamaris    Soleus stretch        Ankle ABC's        [place] board  20x 4 direction, seated      Ankle TB 4-way  RTB 20x ea  RTB 20x ea    Seated HR/TR Standing HR/TR green 2x10 Green ball 20x ea  Standing HR green " "10x Standing HR/TR green 2x10    sidestepping  YTB at forefeet 3 laps at plinth  YTB at forefeet 3 laps at plinth    Standing hip 3-way        Neuro Re-Ed        Toe yoga        Arch lifts         Frog pickup        Wobble board 1'/1' 1'/1' 1'/1' 1'/1' 1'/1'   Tandem stance 45\" 2x ea on foam 45\" 2x ea on foam 45\" 2x ea on foam 45\" 2x ea on foam    Tandem walk on foam 3 long laps at mirror  2 long laps at mirror 2 long laps at [employer] on foam 3 long laps at mirror  2 long laps at mirror 2 long laps at mirror    Step ups onto bosu 10x ea fwd   10x ea fwd                             Education    Updated HEP     Re-evaluation              Ther Act                                         Modalities                                                                    "

## 2023-06-13 ENCOUNTER — OFFICE VISIT (OUTPATIENT)
Dept: PHYSICAL THERAPY | Facility: CLINIC | Age: 68
End: 2023-06-13
Payer: MEDICARE

## 2023-06-13 DIAGNOSIS — M67.88 PERONEAL TENDINOSIS, LEFT: ICD-10-CM

## 2023-06-13 DIAGNOSIS — M79.671 FOOT PAIN, BILATERAL: Primary | ICD-10-CM

## 2023-06-13 DIAGNOSIS — M67.88 PERONEAL TENDINOSIS, RIGHT: ICD-10-CM

## 2023-06-13 DIAGNOSIS — M79.672 FOOT PAIN, BILATERAL: Primary | ICD-10-CM

## 2023-06-13 DIAGNOSIS — M67.88 ACHILLES TENDINOSIS OF BOTH LOWER EXTREMITIES: ICD-10-CM

## 2023-06-13 PROCEDURE — 97016 VASOPNEUMATIC DEVICE THERAPY: CPT | Performed by: PHYSICAL THERAPIST

## 2023-06-13 PROCEDURE — 97110 THERAPEUTIC EXERCISES: CPT | Performed by: PHYSICAL THERAPIST

## 2023-06-13 PROCEDURE — 97140 MANUAL THERAPY 1/> REGIONS: CPT | Performed by: PHYSICAL THERAPIST

## 2023-06-13 NOTE — PROGRESS NOTES
Daily Note     Today's date: 2023  Patient name: Ken Celis  : 1955  MRN: [id number]  Referring provider: Joana Winn DPM  Dx:   Encounter Diagnosis     ICD-10-CM    1  Foot pain, bilateral  M79 671     M79 672       2  Achilles tendinosis of both lower extremities  M67 88       3  Peroneal tendinosis, left  M67 88       4  Peroneal tendinosis, right  M67 88           Start Time: 1100  Stop Time: 1145  Total time in clinic (min): 45 minutes    Subjective: Patient reports that her L foot has been in a lot of pain over the last few days and that she is unsure why  She put a call into her podiatrist  Has some increased swelling and difficulty walking as well  Objective: See treatment diary below      Assessment: Tolerated treatment fair  Patient would benefit from continued PT  Patient arrived with increased pain this session with no JANETTE or reason  Based on her multiple joint complaints and deeper type of pain explained, I suspect it may be due to pressure changes and rain over the past 24 hours or a flare up of fibromyalgia  I examined her ankle and do not suspect fracture and she is able to walk, however antalgic at this time  Session included education, EPAT and ice/compression to end session  She plans to speak to her podiatrist  Will continue as able  Plan: Continue per plan of care  Progress note during next visit  Progress treatment as tolerated         Diagnosis: Bilateral Foot Pain (achilles/peroneal tendinosis)    Precautions: potential lumbar etiology -- fibromyalgia, chronic pain, raynauds, hx of LBP   Primary Goals: ankle ROM, strength, balance, improve tolerance for walking   *asterisks by exercise = given for HEP   Manuals    Ankle PROM/mobs        EPAT NV White head, 2x adamaris  White head, 2x adamaris NV White head x2 adamaris   IASTM  LB    KK, PTA                    There Ex        Bike 5 min recumbent   5 mins recumbent 5 min recumbent RPE 3/10   Gastroc "stretch 20\" 4x ea  20\" 4x ea 20\" 4x ea 3x30 sec adamaris    Soleus stretch        Ankle ABC's        [place] board        Ankle TB 4-way    RTB 20x ea    Seated HR/TR Standing HR/TR green 2x10  Standing HR green 10x Standing HR/TR green 2x10    sidestepping    YTB at forefeet 3 laps at plint    Standing hip 3-way        Neuro Re-Ed        Toe yoga        Arch lifts         Frog pickup        Wobble board 1'/1'  1'/1' 1'/1' 1'/1'   Tandem stance 45\" 2x ea on foam  45\" 2x ea on foam 45\" 2x ea on foam    Tandem walk on foam 3 long laps at mirror  2 long laps at mirror 2 long laps at [email or web address] on foam 3 long laps at mirror  2 long laps at mirror 2 long laps at [place]    Step ups onto bosu 10x ea fwd   10x ea fwd                             Education  Potential etiology of symptoms including weather/pressure change and fibromyalgia flare up  Updated HEP     Re-evaluation             Ther Act                                      Modalities            Game ready  10 mins                                                       "

## 2023-06-15 ENCOUNTER — EVALUATION (OUTPATIENT)
Dept: PHYSICAL THERAPY | Facility: CLINIC | Age: 68
End: 2023-06-15
Payer: MEDICARE

## 2023-06-15 DIAGNOSIS — M79.671 FOOT PAIN, BILATERAL: Primary | ICD-10-CM

## 2023-06-15 DIAGNOSIS — M79.672 FOOT PAIN, BILATERAL: Primary | ICD-10-CM

## 2023-06-15 DIAGNOSIS — M67.88 ACHILLES TENDINOSIS OF BOTH LOWER EXTREMITIES: ICD-10-CM

## 2023-06-15 DIAGNOSIS — M67.88 PERONEAL TENDINOSIS, RIGHT: ICD-10-CM

## 2023-06-15 DIAGNOSIS — M67.88 PERONEAL TENDINOSIS, LEFT: ICD-10-CM

## 2023-06-15 PROCEDURE — 97110 THERAPEUTIC EXERCISES: CPT | Performed by: PHYSICAL THERAPIST

## 2023-06-15 PROCEDURE — 97140 MANUAL THERAPY 1/> REGIONS: CPT | Performed by: PHYSICAL THERAPIST

## 2023-06-15 PROCEDURE — 97016 VASOPNEUMATIC DEVICE THERAPY: CPT | Performed by: PHYSICAL THERAPIST

## 2023-06-15 PROCEDURE — 97112 NEUROMUSCULAR REEDUCATION: CPT | Performed by: PHYSICAL THERAPIST

## 2023-06-15 NOTE — PROGRESS NOTES
PT Re-Evaluation     Today's date: 6/15/2023  Patient name: Phill Sarmiento  : 1955  MRN: [id number]  Referring provider: Javon Butcher DPM  Dx:   Encounter Diagnosis     ICD-10-CM    1  Foot pain, bilateral  M79 671     M79 672       2  Achilles tendinosis of both lower extremities  M67 88       3  Peroneal tendinosis, left  M67 88       4  Peroneal tendinosis, right  M67 88           Start Time: 7095  Stop Time: 0940  Total time in clinic (min): 48 minutes    Assessment  Assessment details: Phill Sarmiento has been compliant with attending PT and home exercise program since initial eval  Kindra Bentley was initially progressing nicely for about 3 weeks, making improvements in subjective reports of pain and limitation, however this past week has had a set-back in progress, with increased pain and dysfunction  There was no JANETTE or inciting event to cause increased pain in her L foot  Regarding her physical deficits, she has ROM and strength WNL and her primary deficit is decreased balance/proprioception  Discussed with her the potential etiologies of her joint pains, including her history of fibromyalgia and chronic pain syndrome  Advised that she discuss this at her follow up with podiatry and potentially follow up with rheumatology  Kindra Bentley continues to have deficits in the above listed impairments and would benefit from additional skilled PT to address these deficits to return to prior level of function  Discussed continuing with PT for a few more weeks to improve balance/proprioception before discharging to [facility]  Patient in agreement with plan  Impairments: abnormal gait, abnormal muscle firing, activity intolerance, impaired balance, impaired physical strength, pain with function and weight-bearing intolerance    Symptom irritability: highUnderstanding of Dx/Px/POC: fair   Prognosis: fair  Prognosis details: Positive prognostic indicators include absence of observed red flags    Negative prognostic "indicators include chronicity of symptoms, anxiety, high symptom irritability, fibromyalgia, multiple concurrent orthopedic problems and multiple prior failed treatments  Goals  (STG) Impairment Goals 4-6 weeks (met)  - Decrease pain to ,<5/10 at baseline with activity  - Improve ankle AROM by 5* in all deficient planes  - Increase ankle strength to 4+/5 throughout      (LTG) Functional Goals 6-8 weeks (not met)  - Return to Prior Level of Function  - Increase Functional Status Measure (FOTO) to: predicted outcome  - Patient will be independent with HEP  - Patient will be able to perform household chores with only mild increased pain/compensation/difficulty  - Patient will be able to walk/stand for >30 minutes without increased pain/compensation/difficulty   - Improve SLS to 20\" bilaterally        Plan  Patient would benefit from: skilled physical therapy  Planned modality interventions: Modalities PRN  Planned therapy interventions: activity modification, manual therapy, neuromuscular re-education, patient education, therapeutic activities, therapeutic exercise, graded activity, home exercise program, balance, balance/weight bearing training, body mechanics training, strengthening, joint mobilization and stretching  Frequency: 2x week  Duration in weeks: 6  Treatment plan discussed with: patient        Subjective Evaluation    History of Present Illness  Mechanism of injury: WORK/SCHOOL: not working  HOBBIES/EXERCISE: On her feet all day doing housework, yardwork  PLOF: Long chronic history of bilateral foot pain   HISTORY OF CURRENT INJURY: Patient reports having trouble with her feet since 1990  Both of her achilles tendons are bothering her  Notes some swelling at her ankles  Also reports having plantar fasciitis \"forever\"  L is worse than right  Often feels like there is pressure at her feet   Feels like she is able to walk as much as she needs to because she just pushes through it, but finds that she is " "limping around  She has been given stretches by her podiatrist but has not done them daily  PAIN LOCATION/DESCRIPTORS: achilles tendons, just distal to medial malleoli, lateral calf region (peroneal muscles and tendons), not so much at plantar surface of feet  Described as aching   AGGRAVATING FACTORS: \"everything\"   EASES: ice and wearing a compression sleeve    DAY PATTERN: best in the morning when she wakes up, worse with activity and being on her feet   IMAGING: see chart    SPECIAL QUESTIONS: reports occasional numbness/tingling her feet  Does have a history of back pain  Angelito Vincent denies a new onset of Bladder incontinence, Bowel dysfunction, Recent unexplained weight loss, Tingling, Numbness and Saddle anesthesia   PATIENT GOALS: \"That I can walk better with less fatigue in my feet\"     Re-evaluation (6/15/23): Patient reports that since beginning PT, that she was feeling some improvements in her pain levels  She is still experiencing some swelling in her ankles, primarily towards the end of the day  She is wearing a compression sleeve on her L ankle as needed  She was having some improvements up until this week when she has had a set-back in her L ankle, with increased pain, swelling and difficulty walking and standing  She does admit to being on her feet primarily the whole day, being busy with her mother's house  She plans to follow up with podiatry as soon as possible  She is using ice and elevation  R foot is not bothering her nearly as much at L at this time  She describes her pain as a deep ache in her ankle bones and sometimes radiating up her shin     Pain  Current pain rating: 3  At best pain ratin  At worst pain ratin  Quality: pressure, sharp and discomfort  Relieving factors: relaxation, rest and ice (elevation)  Aggravating factors: walking and standing  Progression: no change (initial improvement, now increased pain of the L ankle as of recent)    Patient Goals  Patient goals for " "therapy: decreased pain  Patient goal: Improved tolerance for walking- progressing        Objective     Observations   Left Ankle/Foot   Positive for atrophy and effusion  Right Ankle/Foot   Positive for atrophy  Palpation   Left   Tenderness of the peroneus  Right   Tenderness of the peroneus  Tenderness   Left Ankle/Foot   Tenderness in the anterior ankle and peroneal tendon  No tenderness in the Achilles insertion, lateral malleolus, medial malleolus and plantar fascia  Right Ankle/Foot   No tenderness in the Achilles insertion, anterior ankle, lateral malleolus, medial malleolus, peroneal tendon and plantar fascia  Additional Tenderness Details  (+) L sinus tarsi    Neurological Testing     Sensation     Ankle/Foot   Left Ankle/Foot   Paresthesia: light touch    Right Ankle/Foot   Paresthesia: light touch    Additional Neurological Details  \"Feels like there is something tight on both feet\"    Active Range of Motion   Left Ankle/Foot   Dorsiflexion (ke): 5 degrees   Plantar flexion: 45 degrees   Inversion: 15 degrees   Eversion: 10 degrees     Right Ankle/Foot   Dorsiflexion (ke): 5 degrees   Plantar flexion: 60 degrees   Inversion: 25 degrees   Eversion: 15 degrees     Strength/Myotome Testing     Left Ankle/Foot   Dorsiflexion: 4+  Plantar flexion: 4  Inversion: 4+  Eversion: 4+    Right Ankle/Foot   Dorsiflexion: 4+  Plantar flexion: 4  Inversion: 4+  Eversion: 4+    Tests   Left Ankle/Foot   Negative for anterior drawer, calcaneal squeeze, eversion talar tilt, inversion talar tilt, posterior drawer and windlass  Right Ankle/Foot   Negative for anterior drawer, calcaneal squeeze, eversion talar tilt, inversion talar tilt, posterior drawer and windlass       Additional Tests Details  SLS: 8\" ea LE before LOB    Swelling   Left Ankle/Foot   Metatarsal heads: 22 cm  Figure 8: 56 5 cm    Right Ankle/Foot   Metatarsal heads: 21 5 cm  Figure 8: 56 5 cm              Diagnosis: Bilateral " "Foot Pain (achilles/peroneal tendinosis)    Precautions: potential lumbar etiology -- fibromyalgia, chronic pain, raynauds, hx of LBP   Primary Goals: ankle ROM, strength, balance, improve tolerance for walking   *asterisks by exercise = given for HEP   Manuals 6/9 6/13 6/15 6/2 6/6   Ankle PROM/mobs        EPAT NV White head, 2x adamaris   NV White head x2 adamaris   IASTM  LB    KK, PTA                    There Ex        Bike 5 min recumbent   5 mins recumbent 5 min recumbent RPE 3/10   Gastroc stretch 20\" 4x ea   20\" 4x ea 3x30 sec adamaris    Soleus stretch        Ankle ABC's        [place] board        Ankle TB 4-way    RTB 20x ea    Seated HR/TR Standing HR/TR green 2x10   Standing HR/TR green 2x10    sidestepping    YTB at forefeet 3 laps at plint    Standing hip 3-way        Neuro Re-Ed        Toe yoga        Arch lifts         Frog pickup        Wobble board 1'/1'   1'/1' 1'/1'   Tandem stance 45\" 2x ea on foam   45\" 2x ea on foam    Tandem walk on foam 3 long laps at mirror   2 long laps at [employer] on foam 3 long laps at mirror   2 long laps at [facility]    Step ups onto bosu 10x ea fwd   10x ea fwd                             Education  Potential etiology of symptoms including weather/pressure change and fibromyalgia flare up  Updated HEP     Re-evaluation     LB        Ther Act                                      Modalities            Game ready  10 mins 10 mins post session                                        "

## 2023-06-15 NOTE — LETTER
Vanessa 15, 2023    Zoya Glover DPM  [address] [facility]    Patient: Phill Sarmiento   YOB: 1955   Date of Visit: 6/15/2023     Encounter Diagnosis     ICD-10-CM    1  Foot pain, bilateral  M79 671     M79 672       2  Achilles tendinosis of both lower extremities  M67 88       3  Peroneal tendinosis, left  M67 88       4  Peroneal tendinosis, right  M67 88           Dear Dr Aranda Beam: Thank you for your recent referral of Phill Sarmiento  Please review the attached evaluation summary from [facility] recent visit  Please verify that you agree with the plan of care by signing the attached order  If you have any questions or concerns, please do not hesitate to call  I sincerely appreciate the opportunity to share in the care of one of your patients and hope to have another opportunity to work with you in the near future  Sincerely,    Chelo Melchor, PT      Referring Provider:      I certify that I have read the below Plan of Care and certify the need for these services furnished under this plan of treatment while under my care  Zoya Glover, [address]  Via Fax: [phone]          PT Re-Evaluation     Today's date: 6/15/2023  Patient name: Phill Sarmiento  : 1955  MRN: [id number]  Referring provider: Javon Butcher DPM  Dx:   Encounter Diagnosis     ICD-10-CM    1  Foot pain, bilateral  M79 671     M79 672       2  Achilles tendinosis of both lower extremities  M67 88       3  Peroneal tendinosis, left  M67 88       4  Peroneal tendinosis, right  M67 88           Start Time: 3292  Stop Time: 0940  Total time in clinic (min): 48 minutes    Assessment  Assessment details:  Phill Sarmiento has been compliant with attending PT and home exercise program since initial alie Bentley was initially progressing nicely for about 3 weeks, making improvements in subjective reports of pain and limitation, however this past week has had a set-back in progress, with increased pain and dysfunction  There was no JANETTE or inciting event to cause increased pain in her L foot  Regarding her physical deficits, she has ROM and strength WNL and her primary deficit is decreased balance/proprioception  Discussed with her the potential etiologies of her joint pains, including her history of fibromyalgia and chronic pain syndrome  Advised that she discuss this at her follow up with podiatry and potentially follow up with rheumatology  Kindra Bentley continues to have deficits in the above listed impairments and would benefit from additional skilled PT to address these deficits to return to prior level of function  Discussed continuing with PT for a few more weeks to improve balance/proprioception before discharging to [facility]  Patient in agreement with plan  Impairments: abnormal gait, abnormal muscle firing, activity intolerance, impaired balance, impaired physical strength, pain with function and weight-bearing intolerance    Symptom irritability: highUnderstanding of Dx/Px/POC: fair   Prognosis: fair  Prognosis details: Positive prognostic indicators include absence of observed red flags  Negative prognostic indicators include chronicity of symptoms, anxiety, high symptom irritability, fibromyalgia, multiple concurrent orthopedic problems and multiple prior failed treatments        Goals  (STG) Impairment Goals 4-6 weeks (met)  - Decrease pain to ,<5/10 at baseline with activity  - Improve ankle AROM by 5* in all deficient planes  - Increase ankle strength to 4+/5 throughout      (LTG) Functional Goals 6-8 weeks (not met)  - Return to Prior Level of Function  - Increase Functional Status Measure (FOTO) to: predicted outcome  - Patient will be independent with HEP  - Patient will be able to perform household chores with only mild increased pain/compensation/difficulty  - Patient will be able to walk/stand for >30 "minutes without increased pain/compensation/difficulty   - Improve SLS to 20\" bilaterally        Plan  Patient would benefit from: skilled physical therapy  Planned modality interventions: Modalities PRN  Planned therapy interventions: activity modification, manual therapy, neuromuscular re-education, patient education, therapeutic activities, therapeutic exercise, graded activity, home exercise program, balance, balance/weight bearing training, body mechanics training, strengthening, joint mobilization and stretching  Frequency: 2x week  Duration in weeks: 6  Treatment plan discussed with: patient        Subjective Evaluation    History of Present Illness  Mechanism of injury: WORK/SCHOOL: not working  HOBBIES/EXERCISE: On her feet all day doing housework, yardwork  PLOF: Long chronic history of bilateral foot pain   HISTORY OF CURRENT INJURY: Patient reports having trouble with her feet since 1990  Both of her achilles tendons are bothering her  Notes some swelling at her ankles  Also reports having plantar fasciitis \"forever\"  L is worse than right  Often feels like there is pressure at her feet  Feels like she is able to walk as much as she needs to because she just pushes through it, but finds that she is limping around  She has been given stretches by her podiatrist but has not done them daily  PAIN LOCATION/DESCRIPTORS: achilles tendons, just distal to medial malleoli, lateral calf region (peroneal muscles and tendons), not so much at plantar surface of feet  Described as aching   AGGRAVATING FACTORS: \"everything\"   EASES: ice and wearing a compression sleeve    DAY PATTERN: best in the morning when she wakes up, worse with activity and being on her feet   IMAGING: see chart    SPECIAL QUESTIONS: reports occasional numbness/tingling her feet  Does have a history of back pain     Alex Howard denies a new onset of Bladder incontinence, Bowel dysfunction, Recent unexplained weight loss, Tingling, Numbness and " "Saddle anesthesia   PATIENT GOALS: \"That I can walk better with less fatigue in my feet\"     Re-evaluation (6/15/23): Patient reports that since beginning PT, that she was feeling some improvements in her pain levels  She is still experiencing some swelling in her ankles, primarily towards the end of the day  She is wearing a compression sleeve on her L ankle as needed  She was having some improvements up until this week when she has had a set-back in her L ankle, with increased pain, swelling and difficulty walking and standing  She does admit to being on her feet primarily the whole day, being busy with her mother's house  She plans to follow up with podiatry as soon as possible  She is using ice and elevation  R foot is not bothering her nearly as much at L at this time  She describes her pain as a deep ache in her ankle bones and sometimes radiating up her shin  Pain  Current pain rating: 3  At best pain ratin  At worst pain ratin  Quality: pressure, sharp and discomfort  Relieving factors: relaxation, rest and ice (elevation)  Aggravating factors: walking and standing  Progression: no change (initial improvement, now increased pain of the L ankle as of recent)    Patient Goals  Patient goals for therapy: decreased pain  Patient goal: Improved tolerance for walking- progressing        Objective     Observations   Left Ankle/Foot   Positive for atrophy and effusion  Right Ankle/Foot   Positive for atrophy  Palpation   Left   Tenderness of the peroneus  Right   Tenderness of the peroneus  Tenderness   Left Ankle/Foot   Tenderness in the anterior ankle and peroneal tendon  No tenderness in the Achilles insertion, lateral malleolus, medial malleolus and plantar fascia  Right Ankle/Foot   No tenderness in the Achilles insertion, anterior ankle, lateral malleolus, medial malleolus, peroneal tendon and plantar fascia       Additional Tenderness Details  (+) L sinus tarsi    Neurological " "Testing     Sensation     Ankle/Foot   Left Ankle/Foot   Paresthesia: light touch    Right Ankle/Foot   Paresthesia: light touch    Additional Neurological Details  \"Feels like there is something tight on both feet\"    Active Range of Motion   Left Ankle/Foot   Dorsiflexion (ke): 5 degrees   Plantar flexion: 45 degrees   Inversion: 15 degrees   Eversion: 10 degrees     Right Ankle/Foot   Dorsiflexion (ke): 5 degrees   Plantar flexion: 60 degrees   Inversion: 25 degrees   Eversion: 15 degrees     Strength/Myotome Testing     Left Ankle/Foot   Dorsiflexion: 4+  Plantar flexion: 4  Inversion: 4+  Eversion: 4+    Right Ankle/Foot   Dorsiflexion: 4+  Plantar flexion: 4  Inversion: 4+  Eversion: 4+    Tests   Left Ankle/Foot   Negative for anterior drawer, calcaneal squeeze, eversion talar tilt, inversion talar tilt, posterior drawer and windlass  Right Ankle/Foot   Negative for anterior drawer, calcaneal squeeze, eversion talar tilt, inversion talar tilt, posterior drawer and windlass       Additional Tests Details  SLS: 8\" ea LE before LOB    Swelling   Left Ankle/Foot   Metatarsal heads: 22 cm  Figure 8: 56 5 cm    Right Ankle/Foot   Metatarsal heads: 21 5 cm  Figure 8: 56 5 cm             Diagnosis: Bilateral Foot Pain (achilles/peroneal tendinosis)    Precautions: potential lumbar etiology -- fibromyalgia, chronic pain, raynauds, hx of LBP   Primary Goals: ankle ROM, strength, balance, improve tolerance for walking   *asterisks by exercise = given for HEP   Manuals 6/9 6/13 6/15 6/2 6/6   Ankle PROM/mobs        EPAT NV White head, 2x adamaris   NV White head x2 adamaris   IASTM  LB    KK, PTA                    There Ex        Bike 5 min recumbent   5 mins recumbent 5 min recumbent RPE 3/10   Gastroc stretch 20\" 4x ea   20\" 4x ea 3x30 sec adamaris    Soleus stretch        Ankle ABC's        [place] board        Ankle TB 4-way    RTB 20x ea    Seated HR/TR Standing HR/TR green 2x10   Standing HR/TR green 2x10    sidestepping    " "YTB at forefeet 3 laps at [facility]    Standing hip 3-way        Neuro Re-Ed        Toe yoga        Arch lifts         Frog pickup        Wobble board 1'/1'   1'/1' 1'/1'   Tandem stance 45\" 2x ea on foam   45\" 2x ea on foam    Tandem walk on foam 3 long laps at mirror   2 long laps at [employer] on foam 3 long laps at mirror   2 long laps at mirror    Step ups onto bosu 10x ea fwd   10x ea fwd                             Education  Potential etiology of symptoms including weather/pressure change and fibromyalgia flare up  Updated HEP     Re-evaluation     LB        Ther Act                                      Modalities            Game ready  10 mins 10 mins post session                                                       "

## 2023-06-16 ENCOUNTER — APPOINTMENT (OUTPATIENT)
Dept: PHYSICAL THERAPY | Facility: CLINIC | Age: 68
End: 2023-06-16
Payer: MEDICARE

## 2023-06-18 DIAGNOSIS — I10 ESSENTIAL HYPERTENSION: ICD-10-CM

## 2023-06-18 RX ORDER — RAMIPRIL 10 MG/1
CAPSULE ORAL
Qty: 90 CAPSULE | Refills: 0 | Status: SHIPPED | OUTPATIENT
Start: 2023-06-18

## 2023-06-20 ENCOUNTER — OFFICE VISIT (OUTPATIENT)
Dept: PHYSICAL THERAPY | Facility: CLINIC | Age: 68
End: 2023-06-20
Payer: MEDICARE

## 2023-06-20 DIAGNOSIS — M67.88 PERONEAL TENDINOSIS, LEFT: ICD-10-CM

## 2023-06-20 DIAGNOSIS — M79.672 FOOT PAIN, BILATERAL: Primary | ICD-10-CM

## 2023-06-20 DIAGNOSIS — M79.671 FOOT PAIN, BILATERAL: Primary | ICD-10-CM

## 2023-06-20 DIAGNOSIS — M67.88 ACHILLES TENDINOSIS OF BOTH LOWER EXTREMITIES: ICD-10-CM

## 2023-06-20 DIAGNOSIS — M67.88 PERONEAL TENDINOSIS, RIGHT: ICD-10-CM

## 2023-06-20 PROCEDURE — 97110 THERAPEUTIC EXERCISES: CPT | Performed by: PHYSICAL THERAPIST

## 2023-06-20 PROCEDURE — 97016 VASOPNEUMATIC DEVICE THERAPY: CPT | Performed by: PHYSICAL THERAPIST

## 2023-06-20 PROCEDURE — 97112 NEUROMUSCULAR REEDUCATION: CPT | Performed by: PHYSICAL THERAPIST

## 2023-06-20 PROCEDURE — 97140 MANUAL THERAPY 1/> REGIONS: CPT | Performed by: PHYSICAL THERAPIST

## 2023-06-20 NOTE — PROGRESS NOTES
"Daily Note     Today's date: 2023  Patient name: Travis Payne  : 1955  MRN: [id number]  Referring provider: Ashley Scales DPM  Dx:   Encounter Diagnosis     ICD-10-CM    1  Foot pain, bilateral  M79 671     M79 672       2  Achilles tendinosis of both lower extremities  M67 88       3  Peroneal tendinosis, left  M67 88       4  Peroneal tendinosis, right  M67 88           Start Time: 1045  Stop Time: 1140  Total time in clinic (min): 55 minutes    Subjective: Patient reports that she is feeling a little better this week than last week  Says her appointment with podiatry was cancelled  Objective: See treatment diary below      Assessment: Tolerated treatment well  Patient would benefit from continued PT  Patient was able to resume exercise set this session with good tolerance  Symptoms seem to be reduced this week vs last  Tolerated manual treatment well and was able to re-introduce balance exercises this session  She favors vasopneumatic post session to help with swelling and soreness  Will continue to progress as tolerated  Plan: Continue per plan of care  Progress treatment as tolerated         Diagnosis: Bilateral Foot Pain (achilles/peroneal tendinosis)    Precautions: potential lumbar etiology -- fibromyalgia, chronic pain, raynauds, hx of LBP   Primary Goals: ankle ROM, strength, balance, improve tolerance for walking   *asterisks by exercise = given for HEP   Manuals 6/9 6/13 6/15 6/20 6/6   Ankle PROM/mobs        EPAT NV White head, 2x adamaris   White head, 2x adamaris  White head x2 adamaris   IASTM  LB   LB KK, PTA                    There Ex        Bike 5 min recumbent   5 mins upright  5 min recumbent RPE 3/10   Gastroc stretch 20\" 4x ea   20\" 4x ea 3x30 sec adamaris    Soleus stretch        Ankle ABC's        [place] board        Ankle TB 4-way        Seated HR/TR Standing HR/TR green 2x10       sidestepping        Standing hip 3-way        Neuro Re-Ed        Toe yoga        Arch lifts       " "  Frog pickup        Wobble board 1'/1'   1 5'/1 5' 1'/1'   Tandem stance 45\" 2x ea on foam   45\" 2x ea on foam    Tandem walk on foam 3 long laps at [facility] [employer] on foam 3 long laps at [facility]       Step ups onto bosu 10x ea fwd                                Education  Potential etiology of symptoms including weather/pressure change and fibromyalgia flare up       Re-evaluation     LB        Ther Act                                      Modalities            Game ready  10 mins 10 mins post session 10 mins post session                                         "

## 2023-06-23 ENCOUNTER — OFFICE VISIT (OUTPATIENT)
Dept: PHYSICAL THERAPY | Facility: CLINIC | Age: 68
End: 2023-06-23
Payer: MEDICARE

## 2023-06-23 DIAGNOSIS — M67.88 ACHILLES TENDINOSIS OF BOTH LOWER EXTREMITIES: ICD-10-CM

## 2023-06-23 DIAGNOSIS — M67.88 PERONEAL TENDINOSIS, RIGHT: ICD-10-CM

## 2023-06-23 DIAGNOSIS — M79.671 FOOT PAIN, BILATERAL: Primary | ICD-10-CM

## 2023-06-23 DIAGNOSIS — M79.672 FOOT PAIN, BILATERAL: Primary | ICD-10-CM

## 2023-06-23 DIAGNOSIS — M67.88 PERONEAL TENDINOSIS, LEFT: ICD-10-CM

## 2023-06-23 PROCEDURE — 97016 VASOPNEUMATIC DEVICE THERAPY: CPT | Performed by: PHYSICAL THERAPIST

## 2023-06-23 PROCEDURE — 97110 THERAPEUTIC EXERCISES: CPT | Performed by: PHYSICAL THERAPIST

## 2023-06-23 NOTE — PROGRESS NOTES
"Daily Note     Today's date: 2023  Patient name: Marlene Cancino  : 1955  MRN: [id number]  Referring provider: Ivis Marquis DPM  Dx:   Encounter Diagnosis     ICD-10-CM    1  Foot pain, bilateral  M79 671     M79 672       2  Achilles tendinosis of both lower extremities  M67 88       3  Peroneal tendinosis, left  M67 88       4  Peroneal tendinosis, right  M67 88           Start Time: 1000  Stop Time: 1045  Total time in clinic (min): 45 minutes    Subjective: Patient reports that her L ankle has flared up again and is very painful  Says it began the day after her last session  She sees podiatry on Tuesday  Objective: L ankle swelling 23 2 cm pre vaso, 23 cm post vaso at malleoli       Assessment: Tolerated treatment fair  Patient had overall increased pain and swelling at the L ankle this session so majority of exercises were held  Was able to perform some gentle range of motion exercises and followed it with 15 minutes of vasopneumatic ice machine to aide in reducing swelling  Suggested that we hold on her next session until she has a follow up with podiatry next week if her symptoms are still severe after the weekend  She will let me know how she is feeling next week  Swelling and pain were improved following vaso this session  Plan: Patient will follow up with podiatry next week  Will progress as indicated        Diagnosis: Bilateral Foot Pain (achilles/peroneal tendinosis)    Precautions: potential lumbar etiology -- fibromyalgia, chronic pain, raynauds, hx of LBP   Primary Goals: ankle ROM, strength, balance, improve tolerance for walking   *asterisks by exercise = given for HEP   Manuals 6/9 6/13 6/15 6/20 6/23   Ankle PROM/mobs        EPAT NV White head, 2x adamaris   White head, 2x adamaris  hold   IASTM  LB   LB hold                   There Ex        Bike 5 min recumbent   5 mins upright     Gastroc stretch 20\" 4x ea   20\" 4x ea    Soleus stretch        Ankle ABC's        [place] board  " "   20x all direction   Ankle TB 4-way        Seated HR/TR Standing HR/TR green 2x10    20x ea   sidestepping        Standing hip 3-way        Neuro Re-Ed        Toe yoga        Arch lifts         Frog pickup        Wobble board 1'/1'   1 5'/1 5'    Tandem stance 45\" 2x ea on foam   45\" 2x ea on foam    Tandem walk on foam 3 long laps at [facility] [employer] on foam 3 long laps at mirror       Step ups onto bosu 10x ea fwd                                Education  Potential etiology of symptoms including weather/pressure change and fibromyalgia flare up       Re-evaluation     LB       Ther Act                                   Modalities            Game ready- vasopneumatic   10 mins 10 mins post session 10 mins post session 15 mins                                          "

## 2023-06-27 ENCOUNTER — APPOINTMENT (OUTPATIENT)
Dept: PHYSICAL THERAPY | Facility: CLINIC | Age: 68
End: 2023-06-27
Payer: MEDICARE

## 2023-06-30 ENCOUNTER — OFFICE VISIT (OUTPATIENT)
Dept: PHYSICAL THERAPY | Facility: CLINIC | Age: 68
End: 2023-06-30
Payer: MEDICARE

## 2023-06-30 DIAGNOSIS — M79.671 FOOT PAIN, BILATERAL: Primary | ICD-10-CM

## 2023-06-30 DIAGNOSIS — M67.88 ACHILLES TENDINOSIS OF BOTH LOWER EXTREMITIES: ICD-10-CM

## 2023-06-30 DIAGNOSIS — M79.672 FOOT PAIN, BILATERAL: Primary | ICD-10-CM

## 2023-06-30 DIAGNOSIS — M67.88 PERONEAL TENDINOSIS, LEFT: ICD-10-CM

## 2023-06-30 DIAGNOSIS — M67.88 PERONEAL TENDINOSIS, RIGHT: ICD-10-CM

## 2023-06-30 PROCEDURE — 97110 THERAPEUTIC EXERCISES: CPT | Performed by: PHYSICAL THERAPIST

## 2023-06-30 PROCEDURE — 97016 VASOPNEUMATIC DEVICE THERAPY: CPT | Performed by: PHYSICAL THERAPIST

## 2023-06-30 NOTE — PROGRESS NOTES
"Daily Note and Discharge     Today's date: 2023  Patient name: Alyce Son  : 1955  MRN: [id number]  Referring provider: Errol Renteria DPM  Dx:   Encounter Diagnosis     ICD-10-CM    1  Foot pain, bilateral  M79 671     M79 672       2  Achilles tendinosis of both lower extremities  M67 88       3  Peroneal tendinosis, left  M67 88       4  Peroneal tendinosis, right  M67 88           Start Time: 1000  Stop Time: 1040  Total time in clinic (min): 40 minutes    Subjective: Patient reports that she had her follow up with podiatry and she will be having MRI's of her ankles/feet  Says that today will be her last session  Objective: See treatment diary below  See previous re-evaluation  Assessment: Session was spent discussing the plan moving forward and discharge recommendations  I provided her with updated HEP focusing on a few balance exercises that she can continue as she feels able  She will be having MRI's and following up with rheumatology to determine further treatment since PT was unsuccessful  Patient is in agreement with plan  Vasopneumatic ice/compression was used post session to held with swelling and pain  Plan: Patient will be discharged at this time  I would be happy to see her in the future if indicated        Diagnosis: Bilateral Foot Pain (achilles/peroneal tendinosis)    Precautions: potential lumbar etiology -- fibromyalgia, chronic pain, raynauds, hx of LBP   Primary Goals: ankle ROM, strength, balance, improve tolerance for walking   *asterisks by exercise = given for HEP   Manuals 6/30 6/13 6/15 6/20 6/23   Ankle PROM/mobs        EPAT  White head, 2x adamaris   White head, 2x adamaris  hold   IASTM     LB hold                   There Ex        Bike    5 mins upright     Gastroc stretch    20\" 4x ea    Soleus stretch        Ankle ABC's        [place] board     20x all direction   Ankle TB 4-way        Seated HR/TR     20x ea   sidestepping        Standing hip 3-way      " "  Neuro Re-Ed        Toe yoga        Arch lifts         Frog pickup        Wobble board    1 5'/1 5'    Tandem stance    45\" 2x ea on foam    Tandem walk on foam        Sidestepping on foam        Step ups onto bosu                                 Education Discharge, plan moving forward, HEP Potential etiology of symptoms including weather/pressure change and fibromyalgia flare up       Re-evaluation    LB       Ther Act                                Modalities            Game ready- vasopneumatic  15 mins 10 mins 10 mins post session 10 mins post session 15 mins                                            "

## 2023-07-13 ENCOUNTER — OFFICE VISIT (OUTPATIENT)
Dept: DERMATOLOGY | Facility: CLINIC | Age: 68
End: 2023-07-13
Payer: MEDICARE

## 2023-07-13 VITALS — HEIGHT: 68 IN | WEIGHT: 200 LBS | TEMPERATURE: 97.6 F | BODY MASS INDEX: 30.31 KG/M2

## 2023-07-13 DIAGNOSIS — L85.3 XEROSIS OF SKIN: ICD-10-CM

## 2023-07-13 DIAGNOSIS — L72.0 EPIDERMAL INCLUSION CYST: ICD-10-CM

## 2023-07-13 DIAGNOSIS — L82.1 SEBORRHEIC KERATOSIS: ICD-10-CM

## 2023-07-13 DIAGNOSIS — D18.01 CHERRY ANGIOMA: ICD-10-CM

## 2023-07-13 DIAGNOSIS — L81.4 LENTIGO: ICD-10-CM

## 2023-07-13 DIAGNOSIS — D22.9 MULTIPLE MELANOCYTIC NEVI: Primary | ICD-10-CM

## 2023-07-13 PROCEDURE — 99213 OFFICE O/P EST LOW 20 MIN: CPT | Performed by: DERMATOLOGY

## 2023-07-13 NOTE — PROGRESS NOTES
[facility] Note     Patient Name: Deo Kilpatrick  Encounter Date: 07/13/2023    Have you been cared for by a [facility]ologist in the last 3 years and, if so, which description applies to you? Yes. I have been here within the last 3 years, and my medical history has NOT changed since that time. I am FEMALE/of child-bearing potential.    REVIEW OF SYSTEMS:  Have you recently had or currently have any of the following? · No changes in my recent health. PAST MEDICAL HISTORY:  Have you personally ever had or currently have any of the following? If "YES," then please provide more detail. · No changes in my medical history. FAMILY HISTORY:  Any "first degree relatives" (parent, brother, sister, or child) with the following? • No changes in my family's known health. PATIENT EXPERIENCE:    • Do you want the Dermatologist to perform a COMPLETE skin exam today including a clinical examination under the "bra and underwear" areas? Yes  • If necessary, do we have your permission to call and leave a detailed message on your Preferred Phone number that includes your specific medical information?   Yes      Allergies   Allergen Reactions   • Drug [Diclofenac Sodium] Tongue Swelling   • Garlic - Food Allergy Throat Swelling   • Latex Anaphylaxis     Latex and Black Rubber   • Meperidine Tongue Swelling   • Nsaids GI Bleeding     Rectal bleeding   • Other Throat Swelling     "Perfumes"   • Percocet [Oxycodone-Acetaminophen] Itching     Norco ok    • Formaldehyde Other (See Comments)     + Skin Test   • Levaquin [Levofloxacin] Other (See Comments)     Achilles tendon pain   • Adhesive [Medical Tape] Rash   • Penicillins Rash   • Sulfa Antibiotics Rash      Current Outpatient Medications:   •  aspirin 81 mg chewable tablet, Chew, Disp: , Rfl:   •  Cholecalciferol (Vitamin D3) 50 MCG (2000 UT) TABS, Take by mouth  , Disp: , Rfl:   •  EPINEPHrine (EPIPEN) 0.3 mg/0.3 mL SOAJ, Inject 0.3 mL (0.3 mg total) into a muscle once for 1 dose, Disp: 0.6 mL, Rfl: 0  •  LORazepam (ATIVAN) 0.5 mg tablet, Take 1/2 to 1 tab PO daily prn for anxiety, Disp: 30 tablet, Rfl: 0  •  Multiple Vitamins-Minerals (CENTRUM ADULTS PO), Take by mouth, Disp: , Rfl:   •  omeprazole (PriLOSEC) 20 mg delayed release capsule, Take 20 mg by mouth daily in the early morning  , Disp: , Rfl:   •  ramipril (ALTACE) 10 MG capsule, Take 1 capsule by mouth once daily, Disp: 90 capsule, Rfl: 0  •  atorvastatin (LIPITOR) 20 mg tablet, Take 1 tablet (20 mg total) by mouth daily, Disp: 30 tablet, Rfl: 0          • Whom besides the patient is providing clinical information about today's encounter?   o NO ADDITIONAL HISTORIAN (patient alone provided history)    Physical Exam and Assessment/Plan by Diagnosis:    MELANOCYTIC NEVI ("Moles")    Physical Exam:  • Anatomic Location Affected:   Mostly on sun-exposed areas of the trunk and extremities  • Morphological Description:  Scattered, 1-4mm round to ovoid, symmetrical-appearing, even bordered, skin colored to dark brown macules/papules, mostly in sun-exposed areas  • Pertinent Positives:  • Pertinent Negatives: Additional History of Present Condition:      Assessment and Plan:  Based on a thorough discussion of this condition and the management approach to it (including a comprehensive discussion of the known risks, side effects and potential benefits of treatment), the patient (family) agrees to implement the following specific plan:  • When outside we recommend using a wide brim hat, sunglasses, long sleeve and pants, sunscreen with SPF 76+ with reapplication every 2 hours, or SPF specific clothing   • Benign, reassured  • Annual skin check     Melanocytic Nevi  Melanocytic nevi ("moles") are tan or brown, raised or flat areas of the skin which have an increased number of melanocytes. Melanocytes are the cells in our body which make pigment and account for skin color.     Some moles are present at birth (I.e., "congenital nevi"), while others come up later in life (i.e., "acquired nevi"). The sun can stimulate the body to make more moles. Sunburns are not the only thing that triggers more moles. Chronic sun exposure can do it too. Clinically distinguishing a healthy mole from melanoma may be difficult, even for experienced dermatologists. The "ABCDE's" of moles have been suggested as a means of helping to alert a person to a suspicious mole and the possible increased risk of melanoma. The suggestions for raising alert are as follows:    Asymmetry: Healthy moles tend to be symmetric, while melanomas are often asymmetric. Asymmetry means if you draw a line through the mole, the two halves do not match in color, size, shape, or surface texture. Asymmetry can be a result of rapid enlargement of a mole, the development of a raised area on a previously flat lesion, scaling, ulceration, bleeding or scabbing within the mole. Any mole that starts to demonstrate "asymmetry" should be examined promptly by a board certified dermatologist.     Border: Healthy moles tend to have discrete, even borders. The border of a melanoma often blends into the normal skin and does not sharply delineate the mole from normal skin. Any mole that starts to demonstrate "uneven borders" should be examined promptly by a board certified dermatologist.     Color: Healthy moles tend to be one color throughout. Melanomas tend to be made up of different colors ranging from dark black, blue, white, or red. Any mole that demonstrates a color change should be examined promptly by a board certified dermatologist.     Diameter: Healthy moles tend to be smaller than 0.6 cm in size; an exception are "congenital nevi" that can be larger. Melanomas tend to grow and can often be greater than 0.6 cm (1/4 of an inch, or the size of a pencil eraser).  This is only a guideline, and many normal moles may be larger than 0.6 cm without being unhealthy. Any mole that starts to change in size (small to bigger or bigger to smaller) should be examined promptly by a board certified dermatologist.     Evolving: Healthy moles tend to "stay the same."  Melanomas may often show signs of change or evolution such as a change in size, shape, color, or elevation. Any mole that starts to itch, bleed, crust, burn, hurt, or ulcerate or demonstrate a change or evolution should be examined promptly by a board certified dermatologist.        LENTIGO    Physical Exam:  • Anatomic Location Affected:  trunk, arms  • Morphological Description:  Light brown macules  • Pertinent Positives:  • Pertinent Negatives: Additional History of Present Condition:      Assessment and Plan:  Based on a thorough discussion of this condition and the management approach to it (including a comprehensive discussion of the known risks, side effects and potential benefits of treatment), the patient (family) agrees to implement the following specific plan:  • When outside we recommend using a wide brim hat, sunglasses, long sleeve and pants, sunscreen with SPF 09+ with reapplication every 2 hours, or SPF specific clothing       What is a lentigo? A lentigo is a pigmented flat or slightly raised lesion with a clearly defined edge. Unlike an ephelis (freckle), it does not fade in the winter months. There are several kinds of lentigo. The name lentigo originally referred to its appearance resembling a small lentil. The plural of lentigo is lentigines, although “lentigos” is also in common use. Who gets lentigines? Lentigines can affect males and females of all ages and races. Solar lentigines are especially prevalent in fair skinned adults. Lentigines associated with syndromes are present at birth or arise during childhood. What causes lentigines?   Common forms of lentigo are due to exposure to ultraviolet radiation:  • Sun damage including sunburn   • Indoor tanning   • Phototherapy, especially photochemotherapy (PUVA)    Ionizing radiation, eg radiation therapy, can also cause lentigines. Several familial syndromes associated with widespread lentigines originate from mutations in Roc-MAP kinase, mTOR signaling and PTEN pathways. What is the treatment for lentigines? Most lentigines are left alone. Attempts to lighten them may not be successful. The following approaches are used:  • SPF 50+ broad-spectrum sunscreen   • Hydroquinone bleaching cream   • Alpha hydroxy acids   • Vitamin C   • Retinoids   • Azelaic acid   • Chemical peels  Individual lesions can be permanently removed using:  • Cryotherapy   • Intense pulsed light   • Pigment lasers    How can lentigines be prevented? Lentigines associated with exposure ultraviolet radiation can be prevented by very careful sun protection. Clothing is more successful at preventing new lentigines than are sunscreens. What is the outlook for lentigines? Lentigines usually persist. They may increase in number with age and sun exposure. Some in sun-protected sites may fade and disappear. CHERRY ANGIOMAS    Physical Exam:  • Anatomic Location Affected:  trunk  • Morphological Description:  Scattered cherry red, 1-4 mm papules. • Pertinent Positives:  • Pertinent Negatives: Additional History of Present Condition:      Assessment and Plan:  Based on a thorough discussion of this condition and the management approach to it (including a comprehensive discussion of the known risks, side effects and potential benefits of treatment), the patient (family) agrees to implement the following specific plan:  • Monitor for changes  • Benign, reassured  •     Assessment and Plan:    Cherry angioma, also known as Tenna Bachelor spots, are benign vascular skin lesions. A "cherry angioma" is a firm red, blue or purple papule, 0.1-1 cm in diameter.  When thrombosed, they can appear black in colour until evaluated with a dermatoscope when the red or purple colour is more easily seen. Cherry angioma may develop on any part of the body but most often appear on the scalp, face, lips and trunk. An angioma is due to proliferating endothelial cells; these are the cells that line the inside of a blood vessel. Angiomas can arise in early life or later in life; the most common type of angioma is a cherry angioma. Cherry angiomas are very common in males and females of any age or race. They are more noticeable in white skin than in skin of colour. They markedly increase in number from about the age of 36. There may be a family history of similar lesions. Eruptive cherry angiomas have been rarely reported to be associated with internal malignancy. The cause of angiomas is unknown. Genetic analysis of cherry angiomas has shown that they frequently carry specific somatic missense mutations in the GNAQ and GNA11 (Q209H) genes, which are involved in other vascular and melanocytic proliferations. SEBORRHEIC KERATOSIS; NON-INFLAMED    Physical Exam:  • Anatomic Location Affected:  trunk  • Morphological Description:  Flat and raised, waxy, smooth to warty textured, yellow to brownish-grey to dark brown to blackish, discrete, "stuck-on" appearing papules. • Pertinent Positives:  • Pertinent Negatives: Additional History of Present Condition:      Assessment and Plan:  Based on a thorough discussion of this condition and the management approach to it (including a comprehensive discussion of the known risks, side effects and potential benefits of treatment), the patient (family) agrees to implement the following specific plan:  • Monitor for changes  • Benign, reassured  •     Seborrheic Keratosis  A seborrheic keratosis is a harmless warty spot that appears during adult life as a common sign of skin aging. Seborrheic keratoses can arise on any area of skin, covered or uncovered, with the usual exception of the palms and soles.  They do not arise from mucous membranes. Seborrheic keratoses can have highly variable appearance. Seborrheic keratoses are extremely common. It has been estimated that over 90% of adults over the age of 61 years have one or more of them. They occur in males and females of all races, typically beginning to erupt in the 35s or 45s. They are uncommon under the age of 21 years. The precise cause of seborrhoeic keratoses is not known. Seborrhoeic keratoses are considered degenerative in nature. As time goes by, seborrheic keratoses tend to become more numerous. Some people inherit a tendency to develop a very large number of them; some people may have hundreds of them. There is no easy way to remove multiple lesions on a single occasion. Unless a specific lesion is "inflamed" and is causing pain or stinging/burning or is bleeding, most insurance companies do not authorize treatment. XEROSIS ("DRY SKIN")    Physical Exam:  • Anatomic Location Affected:  diffuse  • Morphological Description:  xerosis  • Pertinent Positives:  • Pertinent Negatives: Additional History of Present Condition:      Assessment and Plan:  Based on a thorough discussion of this condition and the management approach to it (including a comprehensive discussion of the known risks, side effects and potential benefits of treatment), the patient (family) agrees to implement the following specific plan:  • Use moisturizer like Eucerin,Cerave or Aveeno Cream 3 times a day for the dry skin   •   •    •     Dry skin refers to skin that feels dry to touch. Dry skin has a dull surface with a rough, scaly quality. The skin is less pliable and cracked. When dryness is severe, the skin may become inflamed and fissured. Although any body site can be dry, dry skin tends to affect the shins more than any other site. Dry skin is lacking moisture in the outer horny cell layer (stratum corneum) and this results in cracks in the skin surface.   Dry skin is also called xerosis, xeroderma or asteatosis (lack of fat). It can affect males and females of all ages. There is some racial variability in water and lipid content of the skin. • Dry skin that starts in early childhood may be one of about 20 types of ichthyosis (fish-scale skin). There is often a family history of dry skin. • Dry skin is commonly seen in people with atopic dermatitis. • Nearly everyone > 60 years has dry skin. Dry skin that begins later may be seen in people with certain diseases and conditions. • Postmenopausal women  • Hypothyroidism  • Chronic renal disease   • Malnutrition and weight loss   • Subclinical dermatitis   • Treatment with certain drugs such as oral retinoids, diuretics and epidermal growth factor receptor inhibitors      What is the treatment for dry skin? The mainstay of treatment of dry skin and ichthyosis is moisturisers/emollients. They should be applied liberally and often enough to:  • Reduce itch   • Improve the barrier function   • Prevent entry of irritants, bacteria   • Reduce transepidermal water loss. How can dry skin be prevented? Eliminate aggravating factors:  • Reduce the frequency of bathing. • A humidifier in winter and air conditioner in summer   • Compare having a short shower with a prolonged soak in a bath. • Use lukewarm, not hot, water. • Replace standard soap with a substitute such as a synthetic detergent cleanser, water-miscible emollient, bath oil, anti-pruritic tar oil, colloidal oatmeal etc.   • Apply an emollient liberally and often, particularly shortly after bathing, and when itchy. The drier the skin, the thicker this should be, especially on the hands. What is the outlook for dry skin? A tendency to dry skin may persist life-long, or it may improve once contributing factors are controlled.   EPIDERMAL INCLUSION CYST    Physical Exam:  • Anatomic Location Affected:  Left ear   • Morphological Description:  Subcutaneous nodule   • Pertinent Positives:  • Pertinent Negatives: Additional History of Present Condition:  Found on exam     Assessment and Plan:  Based on a thorough discussion of this condition and the management approach to it (including a comprehensive discussion of the known risks, side effects and potential benefits of treatment), the patient (family) agrees to implement the following specific plan:  • Reassured benign   • Monitor for any changes     What are epidermal inclusion cysts? Epidermal inclusion cysts are the most common, benign cutaneous cysts. There are many different names for epidermal inclusion cysts, including epidermoid cyst, epidermal cyst, infundibular cyst, inclusion cyst, and keratin cyst. These cysts can occur anywhere on the body and typically present as nodules directly underneath the skin. There is often a visible pore or opening in the center. The cysts are freely moveable and can range from a few millimeters to several centimeters in diameter. The center of epidermoid cysts almost always contains keratin, which has a cheesy appearance, and not sebum. They also do not originate from sebaceous glands. Therefore, epidermal inclusion cysts are not the same as sebaceous cysts. Cysts may remain stable or progressively enlarge over time. There are no reliable predictive factors to tell if an epidermal inclusion cyst will enlarge, become inflamed, or remain quiescent. Infected cysts tend to become larger, turn red, and are more noticeable to the patient. There may be accompanying pain and discomfort. What causes epidermal inclusion cysts? Epidermal inclusion cysts often appear out of the blue and are not contagious. They are due to a proliferation of epidermal cells within the dermis and are more common in men than women. They occur more frequently in patients in their 20s to 45s.  Epidermal inclusion cysts by themselves are usually not inherited, but they can be hereditary in rare syndromes such as Guadelupe Lawrence syndrome, nodular elastosis with cysts and comedones (Favre-Racouchot syndrome), and basal cell nevus syndrome (Gorlin syndrome). Elderly patients with chronic sun-damaged skin areas have a higher likelihood of developing epidermoid cysts. They often occur in areas where hair follicles have been inflamed or repeatedly irritated are more frequent in patients with acne vulgaris. In the  period, they are called milia. Patients on BRAF inhibitors such as imiquimod and cyclosporine have a higher incidence of epidermoid cysts of the face. How do we diagnose an epidermal inclusion cyst?  Epidermoid inclusion cysts are often diagnosed by history and physical exam. There is usually no need for biopsy prior to removal.  Radiographic and laboratory exams, such as ultrasound studies, are unnecessary and not typically ordered unless the practitioner suspects a genetic condition. What is the treatment for an epidermal inclusion cyst?  Inflamed, uninfected epidermal inclusion cysts rarely resolve spontaneously without therapy or surgical intervention. Treatment is not emergent unless desired by the patient. Definitive treatment is via surgical excision with walls intact. This method will prevent recurrence. This is best done when the cyst is not inflamed, to decrease the probability of rupture during surgery. - A local anesthetic will be injected around the cyst  - A small incision is made in the skin overlying the cyst, and contents are expressed  - The incision is repaired with sutures    Another option is to use a 4mm punch biopsy with cyst extraction through the defect. Incision and drainage is often needed if the cyst is infected or inflamed. If there is surrounding cellulitis, oral antibiotic therapy may be necessary. The common agents used target methicillin sensitive Staphylococcal aureus and methicillin resistant S aureus in areas of high prevalence.      Scribe Attestation    I,:  [employer] Jerry Mason am acting as a scribe while in the presence of the attending physician.:       I,:  Luis Hannah MD personally performed the services described in this documentation    as scribed in my presence.:

## 2023-07-13 NOTE — PATIENT INSTRUCTIONS
MELANOCYTIC NEVI ("Moles")  Assessment and Plan:  Based on a thorough discussion of this condition and the management approach to it (including a comprehensive discussion of the known risks, side effects and potential benefits of treatment), the patient (family) agrees to implement the following specific plan:  When outside we recommend using a wide brim hat, sunglasses, long sleeve and pants, sunscreen with SPF 56+ with reapplication every 2 hours, or SPF specific clothing   Benign, reassured  Annual skin check     Melanocytic Nevi  Melanocytic nevi ("moles") are tan or brown, raised or flat areas of the skin which have an increased number of melanocytes. Melanocytes are the cells in our body which make pigment and account for skin color. Some moles are present at birth (I.e., "congenital nevi"), while others come up later in life (i.e., "acquired nevi"). The sun can stimulate the body to make more moles. Sunburns are not the only thing that triggers more moles. Chronic sun exposure can do it too. Clinically distinguishing a healthy mole from melanoma may be difficult, even for experienced dermatologists. The "ABCDE's" of moles have been suggested as a means of helping to alert a person to a suspicious mole and the possible increased risk of melanoma. The suggestions for raising alert are as follows:    Asymmetry: Healthy moles tend to be symmetric, while melanomas are often asymmetric. Asymmetry means if you draw a line through the mole, the two halves do not match in color, size, shape, or surface texture. Asymmetry can be a result of rapid enlargement of a mole, the development of a raised area on a previously flat lesion, scaling, ulceration, bleeding or scabbing within the mole. Any mole that starts to demonstrate "asymmetry" should be examined promptly by a board certified dermatologist.     Border: Healthy moles tend to have discrete, even borders.   The border of a melanoma often blends into the normal skin and does not sharply delineate the mole from normal skin. Any mole that starts to demonstrate "uneven borders" should be examined promptly by a board certified dermatologist.     Color: Healthy moles tend to be one color throughout. Melanomas tend to be made up of different colors ranging from dark black, blue, white, or red. Any mole that demonstrates a color change should be examined promptly by a board certified dermatologist.     Diameter: Healthy moles tend to be smaller than 0.6 cm in size; an exception are "congenital nevi" that can be larger. Melanomas tend to grow and can often be greater than 0.6 cm (1/4 of an inch, or the size of a pencil eraser). This is only a guideline, and many normal moles may be larger than 0.6 cm without being unhealthy. Any mole that starts to change in size (small to bigger or bigger to smaller) should be examined promptly by a board certified dermatologist.     Evolving: Healthy moles tend to "stay the same."  Melanomas may often show signs of change or evolution such as a change in size, shape, color, or elevation. Any mole that starts to itch, bleed, crust, burn, hurt, or ulcerate or demonstrate a change or evolution should be examined promptly by a board certified dermatologist.      Nikia Dines and Plan:  Based on a thorough discussion of this condition and the management approach to it (including a comprehensive discussion of the known risks, side effects and potential benefits of treatment), the patient (family) agrees to implement the following specific plan:  When outside we recommend using a wide brim hat, sunglasses, long sleeve and pants, sunscreen with SPF 22+ with reapplication every 2 hours, or SPF specific clothing       What is a lentigo? A lentigo is a pigmented flat or slightly raised lesion with a clearly defined edge. Unlike an ephelis (freckle), it does not fade in the winter months. There are several kinds of lentigo.   The name lentigo originally referred to its appearance resembling a small lentil. The plural of lentigo is lentigines, although “lentigos” is also in common use. Who gets lentigines? Lentigines can affect males and females of all ages and races. Solar lentigines are especially prevalent in fair skinned adults. Lentigines associated with syndromes are present at birth or arise during childhood. What causes lentigines? Common forms of lentigo are due to exposure to ultraviolet radiation:  Sun damage including sunburn   Indoor tanning   Phototherapy, especially photochemotherapy (PUVA)    Ionizing radiation, eg radiation therapy, can also cause lentigines. Several familial syndromes associated with widespread lentigines originate from mutations in Roc-MAP kinase, mTOR signaling and PTEN pathways. What is the treatment for lentigines? Most lentigines are left alone. Attempts to lighten them may not be successful. The following approaches are used:  SPF 50+ broad-spectrum sunscreen   Hydroquinone bleaching cream   Alpha hydroxy acids   Vitamin C   Retinoids   Azelaic acid   Chemical peels  Individual lesions can be permanently removed using:  Cryotherapy   Intense pulsed light   Pigment lasers    How can lentigines be prevented? Lentigines associated with exposure ultraviolet radiation can be prevented by very careful sun protection. Clothing is more successful at preventing new lentigines than are sunscreens. What is the outlook for lentigines? Lentigines usually persist. They may increase in number with age and sun exposure. Some in sun-protected sites may fade and disappear.     MONTGOMERY ANGIOMAS        Assessment and Plan:  Based on a thorough discussion of this condition and the management approach to it (including a comprehensive discussion of the known risks, side effects and potential benefits of treatment), the patient (family) agrees to implement the following specific plan:  Monitor for changes  Benign, reassured      Assessment and Plan:    Cherry angioma, also known as [employer] spots, are benign vascular skin lesions. A "cherry angioma" is a firm red, blue or purple papule, 0.1-1 cm in diameter. When thrombosed, they can appear black in colour until evaluated with a dermatoscope when the red or purple colour is more easily seen. Cherry angioma may develop on any part of the body but most often appear on the scalp, face, lips and trunk. An angioma is due to proliferating endothelial cells; these are the cells that line the inside of a blood vessel. Angiomas can arise in early life or later in life; the most common type of angioma is a cherry angioma. Cherry angiomas are very common in males and females of any age or race. They are more noticeable in white skin than in skin of colour. They markedly increase in number from about the age of 36. There may be a family history of similar lesions. Eruptive cherry angiomas have been rarely reported to be associated with internal malignancy. The cause of angiomas is unknown. Genetic analysis of cherry angiomas has shown that they frequently carry specific somatic missense mutations in the GNAQ and GNA11 (Q209H) genes, which are involved in other vascular and melanocytic proliferations. SEBORRHEIC KERATOSIS; NON-INFLAMED      Assessment and Plan:  Based on a thorough discussion of this condition and the management approach to it (including a comprehensive discussion of the known risks, side effects and potential benefits of treatment), the patient (family) agrees to implement the following specific plan:  Monitor for changes  Benign, reassured      Seborrheic Keratosis  A seborrheic keratosis is a harmless warty spot that appears during adult life as a common sign of skin aging. Seborrheic keratoses can arise on any area of skin, covered or uncovered, with the usual exception of the palms and soles.  They do not arise from mucous membranes. Seborrheic keratoses can have highly variable appearance. Seborrheic keratoses are extremely common. It has been estimated that over 90% of adults over the age of 61 years have one or more of them. They occur in males and females of all races, typically beginning to erupt in the 35s or 45s. They are uncommon under the age of 21 years. The precise cause of seborrhoeic keratoses is not known. Seborrhoeic keratoses are considered degenerative in nature. As time goes by, seborrheic keratoses tend to become more numerous. Some people inherit a tendency to develop a very large number of them; some people may have hundreds of them. There is no easy way to remove multiple lesions on a single occasion. Unless a specific lesion is "inflamed" and is causing pain or stinging/burning or is bleeding, most insurance companies do not authorize treatment. XEROSIS ("DRY SKIN")        Assessment and Plan:  Based on a thorough discussion of this condition and the management approach to it (including a comprehensive discussion of the known risks, side effects and potential benefits of treatment), the patient (family) agrees to implement the following specific plan:  Use moisturizer like Eucerin,Cerave or Aveeno Cream 3 times a day for the dry skin            Dry skin refers to skin that feels dry to touch. Dry skin has a dull surface with a rough, scaly quality. The skin is less pliable and cracked. When dryness is severe, the skin may become inflamed and fissured. Although any body site can be dry, dry skin tends to affect the shins more than any other site. Dry skin is lacking moisture in the outer horny cell layer (stratum corneum) and this results in cracks in the skin surface. Dry skin is also called xerosis, xeroderma or asteatosis (lack of fat). It can affect males and females of all ages. There is some racial variability in water and lipid content of the skin.   Dry skin that starts in early childhood may be one of about 20 types of ichthyosis (fish-scale skin). There is often a family history of dry skin. Dry skin is commonly seen in people with atopic dermatitis. Nearly everyone > 60 years has dry skin. Dry skin that begins later may be seen in people with certain diseases and conditions. Postmenopausal women  Hypothyroidism  Chronic renal disease   Malnutrition and weight loss   Subclinical dermatitis   Treatment with certain drugs such as oral retinoids, diuretics and epidermal growth factor receptor inhibitors      What is the treatment for dry skin? The mainstay of treatment of dry skin and ichthyosis is moisturisers/emollients. They should be applied liberally and often enough to:  Reduce itch   Improve the barrier function   Prevent entry of irritants, bacteria   Reduce transepidermal water loss. How can dry skin be prevented? Eliminate aggravating factors:  Reduce the frequency of bathing. A humidifier in winter and air conditioner in summer   Compare having a short shower with a prolonged soak in a bath. Use lukewarm, not hot, water. Replace standard soap with a substitute such as a synthetic detergent cleanser, water-miscible emollient, bath oil, anti-pruritic tar oil, colloidal oatmeal etc.   Apply an emollient liberally and often, particularly shortly after bathing, and when itchy. The drier the skin, the thicker this should be, especially on the hands. What is the outlook for dry skin? A tendency to dry skin may persist life-long, or it may improve once contributing factors are controlled.   EPIDERMAL INCLUSION CYST        Assessment and Plan:  Based on a thorough discussion of this condition and the management approach to it (including a comprehensive discussion of the known risks, side effects and potential benefits of treatment), the patient (family) agrees to implement the following specific plan:  Reassured benign   Monitor for any changes     What are epidermal inclusion cysts? Epidermal inclusion cysts are the most common, benign cutaneous cysts. There are many different names for epidermal inclusion cysts, including epidermoid cyst, epidermal cyst, infundibular cyst, inclusion cyst, and keratin cyst. These cysts can occur anywhere on the body and typically present as nodules directly underneath the skin. There is often a visible pore or opening in the center. The cysts are freely moveable and can range from a few millimeters to several centimeters in diameter. The center of epidermoid cysts almost always contains keratin, which has a cheesy appearance, and not sebum. They also do not originate from sebaceous glands. Therefore, epidermal inclusion cysts are not the same as sebaceous cysts. Cysts may remain stable or progressively enlarge over time. There are no reliable predictive factors to tell if an epidermal inclusion cyst will enlarge, become inflamed, or remain quiescent. Infected cysts tend to become larger, turn red, and are more noticeable to the patient. There may be accompanying pain and discomfort. What causes epidermal inclusion cysts? Epidermal inclusion cysts often appear out of the blue and are not contagious. They are due to a proliferation of epidermal cells within the dermis and are more common in men than women. They occur more frequently in patients in their 20s to 45s. Epidermal inclusion cysts by themselves are usually not inherited, but they can be hereditary in rare syndromes such as Gonzalez syndrome, nodular elastosis with cysts and comedones (Favre-Racouchot syndrome), and basal cell nevus syndrome (Gorlin syndrome). Elderly patients with chronic sun-damaged skin areas have a higher likelihood of developing epidermoid cysts. They often occur in areas where hair follicles have been inflamed or repeatedly irritated are more frequent in patients with acne vulgaris. In the  period, they are called milia.      Patients on BRAF inhibitors such as imiquimod and cyclosporine have a higher incidence of epidermoid cysts of the face. How do we diagnose an epidermal inclusion cyst?  Epidermoid inclusion cysts are often diagnosed by history and physical exam. There is usually no need for biopsy prior to removal.  Radiographic and laboratory exams, such as ultrasound studies, are unnecessary and not typically ordered unless the practitioner suspects a genetic condition. What is the treatment for an epidermal inclusion cyst?  Inflamed, uninfected epidermal inclusion cysts rarely resolve spontaneously without therapy or surgical intervention. Treatment is not emergent unless desired by the patient. Definitive treatment is via surgical excision with walls intact. This method will prevent recurrence. This is best done when the cyst is not inflamed, to decrease the probability of rupture during surgery. A local anesthetic will be injected around the cyst  A small incision is made in the skin overlying the cyst, and contents are expressed  The incision is repaired with sutures    Another option is to use a 4mm punch biopsy with cyst extraction through the defect. Incision and drainage is often needed if the cyst is infected or inflamed. If there is surrounding cellulitis, oral antibiotic therapy may be necessary. The common agents used target methicillin sensitive Staphylococcal aureus and methicillin resistant S aureus in areas of high prevalence.

## 2023-07-26 ENCOUNTER — OFFICE VISIT (OUTPATIENT)
Dept: OBGYN CLINIC | Facility: CLINIC | Age: 68
End: 2023-07-26
Payer: MEDICARE

## 2023-07-26 DIAGNOSIS — M84.372D: Primary | ICD-10-CM

## 2023-07-26 PROCEDURE — 99203 OFFICE O/P NEW LOW 30 MIN: CPT | Performed by: ORTHOPAEDIC SURGERY

## 2023-07-26 NOTE — PROGRESS NOTES
Autumn Baez M.D. Attending, Orthopaedic Surgery  [facility]      ORTHOPAEDIC FOOT AND ANKLE CLINIC VISIT     Assessment:     Encounter Diagnosis   Name Primary? • Stress fracture of left ankle with routine healing Yes            Plan:   · The patient verbalized understanding of exam findings and treatment plan. We engaged in the shared decision-making process and treatment options were discussed at length with the patient. Surgical and conservative management discussed today along with risks and benefits. · The patient has a stress reaction of her talus. She has had a sudden increase in her activity over the past few months because she is moving her mother out of her house and selling the house. · She may be weight bearing as tolerated in a CAM boot. · Activity modifications including rest and supportive footwear were emphasized and information provided in AVSS. · We discussed that if she does not have improvement in her pain in 6 weeks with the above noted interventions, then we may have to consider the possibility of talar AVN. She does not have any identifiable risk factor for development of AVN but the amount of bony edema in her talus is concerning for this. Return in about 6 weeks (around 9/6/2023). History of Present Illness:   Chief Complaint: Left ankle pain  Sharri Martin is a 79 y.o. female who is being seen for left ankle pain. She was referred to us by her podiatrist after they obtained an MRI of the left ankle that demonstrate diffuse edema within the talus. Her pain initially began as her usual achilles and peroneal tendonitis pain that she has had for years, but recently worsened over the last few months. Pain is localized globally  Within her ankle joint minimal radiating and described as sharp and severe. Patient denies numbness, tingling or radicular pain. Denies history of neuropathy.   Patient does not smoke, does not have diabetes and does take blood thinners - asa 81 mg. She denies steroid use, sickle cell, prior chemotherapy treatment. A recent DEXA scan did identify osteoporosis. Pain/symptom timing:  Worse during the day when active  Pain/symptom context:  Worse with activites and work  Pain/symptom modifying factors:  Rest makes better, activities make worse  Pain/symptom associated signs/symptoms: none    Prior treatment   · NSAIDsNo - cannot take   · Injections No   · Bracing/Orthotics Yes    · Physical Therapy Yes     Orthopedic Surgical History:   See below    Past Medical, Surgical and Social History:  Past Medical History:  has a past medical history of Allergic, Asthma, Dahl esophagus, Dahl's esophagus, Basal cell carcinoma (2021), Closed left ankle fracture, Diverticulitis, Fibromyalgia, primary, GERD (gastroesophageal reflux disease), Hypertension, Otitis media, Scoliosis (), Urinary tract infection, and Visual impairment (May 2021). Problem List: does not have any pertinent problems on file. Past Surgical History:  has a past surgical history that includes Elbow surgery (Left); Skin biopsy; Mohs surgery (Left, 09/15/2021);  section (1986);  section (1989); Colonoscopy; EGD; pr laparoscopy surg cholecystectomy (N/A, 2021); Cholecystectomy (Dec. 2021); Upper gastrointestinal endoscopy; and Band hemorrhoidectomy. Family History: family history includes Asthma in her brother, daughter, maternal grandfather, and paternal grandmother; Dahl's esophagus in her brother, brother, brother, and sister; Cancer (age of onset: 76) in her brother; Colon cancer (age of onset: 80) in her maternal grandfather; Diabetes (age of onset: 79) in her father and mother; Heart disease in her mother; Heart disease (age of onset: 61) in her father; Hyperlipidemia in her brother, brother, and sister;  Hypertension in her brother, father, mother, and sister; Lung disease in her brother; Melanoma in her brother and sister; No Known Problems in her maternal aunt, maternal aunt, maternal aunt, maternal grandmother, paternal grandfather, and son; Prostate cancer in her father; Stroke in her father. Social History:  reports that she has never smoked. She has never used smokeless tobacco. She reports that she does not drink alcohol and does not use drugs. Current Medications: has a current medication list which includes the following prescription(s): aspirin, atorvastatin, vitamin d3, epinephrine, lorazepam, multiple vitamins-minerals, omeprazole, and ramipril. Allergies: is allergic to drug [diclofenac sodium], garlic - food allergy, latex, meperidine, nsaids, other, percocet [oxycodone-acetaminophen], formaldehyde, levaquin [levofloxacin], adhesive [medical tape], penicillins, and sulfa antibiotics. Review of Systems:  General- denies fever/chills  HEENT- denies hearing loss or sore throat  Eyes- denies eye pain or visual disturbances, denies red eyes  Respiratory- denies cough or SOB  Cardio- denies chest pain or palpitations  GI- denies abdominal pain  Endocrine- denies urinary frequency  Urinary- denies pain with urination  Musculoskeletal- Negative except noted above  Skin- denies rashes or wounds  Neurological- denies dizziness or headache  Psychiatric- denies anxiety or difficulty concentrating    Physical Exam:   There were no vitals taken for this visit. General/Constitutional: No apparent distress: well-nourished and well developed. Eyes: normal ocular motion  Cardio: RRR, Normal S1S2, No m/r/g  Lymphatic: No appreciable lymphadenopathy  Respiratory: Non-labored breathing, CTA b/l no w/c/r  Vascular: No edema, swelling or tenderness, except as noted in detailed exam.  Integumentary: No impressive skin lesions present, except as noted in detailed exam.  Neuro: No ataxia or tremors noted  Psych: Normal mood and affect, oriented to person, place and time.  Appropriate affect. Musculoskeletal: Normal, except as noted in detailed exam and in HPI. Examination    Left    Gait Normal   Musculoskeletal Nontender to palpation. Skin Normal.      Nails Normal    Range of Motion  10 degrees dorsiflexion, 40 degrees plantarflexion  Subtalar motion: normal    Stability Stable    Muscle Strength 5/5 tibialis anterior  5/5 gastrocnemius-soleus  5/5 posterior tibialis  5/5 peroneal/eversion strength  5/5 EHL  5/5 FHL    Neurologic Normal    Sensation Intact to light touch throughout sural, saphenous, superficial peroneal, deep peroneal and medial/lateral plantar nerve distributions. Raleigh-Maricarmen 5.07 filament (10g) testing deferred. Cardiovascular Brisk capillary refill < 2 seconds,intact DP and PT pulses    Special Tests None      Imaging Studies:   MRI obtained and available for review demonstrates significant edema diffusely throughout the talus. No stress fracture identified. Reviewed by me personally        Gaylene Shim. Lachman, MD  Foot & Ankle Surgery   Department of [address] [place] [address]      I personally performed the service. Gaylene Shim. Lachman, MD

## 2023-07-26 NOTE — PATIENT INSTRUCTIONS
Weightbearing as tolerated in CAM boot  Do not need to wear the boot for sleep or showering but should wear it any time you are walking on it. Recommend taking the following supplements: Vitamin D3- 4000 units per day and Calcium 1200 mg per day. This will help with bone healing. Avoid NSAIDs like Motrin/Aleve/Ibuprofen. Avoid steroids/nicotine products/ rheumatoid medications like DMARDs if possible. Aspirin 81mg for blood clot prevention.     Knee high compression stocking 20-30mm HG of pressure

## 2023-09-06 ENCOUNTER — OFFICE VISIT (OUTPATIENT)
Dept: OBGYN CLINIC | Facility: CLINIC | Age: 68
End: 2023-09-06
Payer: MEDICARE

## 2023-09-06 VITALS
HEIGHT: 68 IN | BODY MASS INDEX: 30.31 KG/M2 | HEART RATE: 108 BPM | SYSTOLIC BLOOD PRESSURE: 157 MMHG | WEIGHT: 200 LBS | DIASTOLIC BLOOD PRESSURE: 95 MMHG

## 2023-09-06 DIAGNOSIS — R29.898 ANKLE WEAKNESS: ICD-10-CM

## 2023-09-06 DIAGNOSIS — M84.372D: Primary | ICD-10-CM

## 2023-09-06 DIAGNOSIS — G57.32 PERONEAL MONONEUROPATHY, LEFT: ICD-10-CM

## 2023-09-06 DIAGNOSIS — R29.898 LEFT LEG WEAKNESS: ICD-10-CM

## 2023-09-06 PROCEDURE — 99213 OFFICE O/P EST LOW 20 MIN: CPT | Performed by: ORTHOPAEDIC SURGERY

## 2023-09-06 NOTE — PATIENT INSTRUCTIONS
You may begin weaning your boot and transitioning to a sneaker (Today). It is important to do this gradually to avoid aggravating the healing process. Today, you may come out of the boot into a sneaker for 1 hours. 2. Tomorrow, you may come out of the boot into a sneaker for 2 hours,  3. The next day, you may come out of the boot into a sneaker for 3 hours. 4. Continue this (adding 1 hours per day) as you tolerate. For example, if you do 6 hours out of the boot into a sneaker and your foot swells more than usual at night and it is difficult to control the discomfort, do not advance to 7 hours the next day, stay at 6 hours until you are able to tolerate it. It is essential to follow this protocol and not modify this. No matter when you begin weaning the boot, it will be difficult and there will be swelling and soreness. If you spend more time than is recommended in the boot, this process becomes longer and more painful. Elevation, Ice and tylenol and staying off of it at night will be important to aide in this transition out of the boot. Swelling and soreness are normal as you begin to do more with the injured leg. May DC aspirin/lovenox, no longer needed. Begin PT  Compression stocking (Knee high, 20-30mm Hg) to be worn at all times while awake. Recommend taking the following supplements: Vitamin D3 4000 units per day. This will help with bone healing.

## 2023-09-06 NOTE — PROGRESS NOTES
Reena Caballero M.D. Attending, Orthopaedic Surgery  Foot and [facility]      ORTHOPAEDIC FOOT AND ANKLE CLINIC VISIT     Assessment:     Encounter Diagnoses   Name Primary? • Stress fracture of left ankle with routine healing Yes   • Peroneal mononeuropathy, left             Plan:   · The patient verbalized understanding of exam findings and treatment plan. We engaged in the shared decision-making process and treatment options were discussed at length with the patient. Surgical and conservative management discussed today along with risks and benefits. · She has not noticed any improvement in the boot over the past 6 weeks. In fact, she is noting many other new complaints since being in the boot. · Begin physical therapy immediately for the ankle weakness   Activity modification, OTC pain meds, ice, and elevation for pain control   Compression socks for swelling control  · Vitd and Ca for bone healing   · We ordered an EMG as it appears like she is having bilateral neuropathy. · She will return in 6 weeks. History of Present Illness:   Chief Complaint:   Chief Complaint   Patient presents with   • Left Ankle - Fracture     Ute Roach is a 79 y.o. female who is being seen in follow-up for left ankle pain. When we last saw she we recommended WBAT in cam boot. Pain has not improved. Residual pain is localized at foot and ankle with minimal radiating and described as sharp and severe.       Pain/symptom timing:  Worse during the day when active  Pain/symptom context:  Worse with activites and work  Pain/symptom modifying factors:  Rest makes better, activities make worse  Pain/symptom associated signs/symptoms: none    Prior treatment   · NSAIDsYes   · Injections No   · Bracing/Orthotics Yes    · Physical Therapy No     Orthopedic Surgical History:   See below    Past Medical, Surgical and Social History:  Past Medical History:  has a past medical history of Allergic, Asthma, Dahl esophagus, Dahl's esophagus, Basal cell carcinoma (2021), Closed left ankle fracture, Diverticulitis, Fibromyalgia, primary, GERD (gastroesophageal reflux disease), Hypertension, Otitis media, Scoliosis (), Urinary tract infection, and Visual impairment (May 2021). Problem List: does not have any pertinent problems on file. Past Surgical History:  has a past surgical history that includes Elbow surgery (Left); Skin biopsy; Mohs surgery (Left, 09/15/2021);  section (1986);  section (1989); Colonoscopy; EGD; pr laparoscopy surg cholecystectomy (N/A, 2021); Cholecystectomy (Dec. 2021); Upper gastrointestinal endoscopy; and Band hemorrhoidectomy. Family History: family history includes Asthma in her brother, daughter, maternal grandfather, and paternal grandmother; Dahl's esophagus in her brother, brother, brother, and sister; Cancer (age of onset: 76) in her brother; Colon cancer (age of onset: 80) in her maternal grandfather; Diabetes (age of onset: 79) in her father and mother; Heart disease in her mother; Heart disease (age of onset: 61) in her father; Hyperlipidemia in her brother, brother, and sister; Hypertension in her brother, father, mother, and sister; Lung disease in her brother; Melanoma in her brother and sister; No Known Problems in her maternal aunt, maternal aunt, maternal aunt, maternal grandmother, paternal grandfather, and son; Prostate cancer in her father; Stroke in her father. Social History:  reports that she has never smoked. She has never used smokeless tobacco. She reports that she does not drink alcohol and does not use drugs.   Current Medications: has a current medication list which includes the following prescription(s): aspirin, vitamin d3, multiple vitamins-minerals, omeprazole, ramipril, atorvastatin, epinephrine, and lorazepam.  Allergies: is allergic to drug [diclofenac sodium], garlic - food allergy, latex, meperidine, nsaids, other, percocet [oxycodone-acetaminophen], formaldehyde, levaquin [levofloxacin], adhesive [medical tape], penicillins, and sulfa antibiotics. Review of Systems:  General- denies fever/chills  HEENT- denies hearing loss or sore throat  Eyes- denies eye pain or visual disturbances, denies red eyes  Respiratory- denies cough or SOB  Cardio- denies chest pain or palpitations  GI- denies abdominal pain  Endocrine- denies urinary frequency  Urinary- denies pain with urination  Musculoskeletal- Negative except noted above  Skin- denies rashes or wounds  Neurological- denies dizziness or headache  Psychiatric- denies anxiety or difficulty concentrating    Physical Exam:   /95 (BP Location: Left arm, Patient Position: Sitting, Cuff Size: Standard)   Pulse (!) 108   Ht 5' 8" (1.727 m)   Wt 90.7 kg (200 lb)   BMI 30.41 kg/m²   General/Constitutional: No apparent distress: well-nourished and well developed. Eyes: normal ocular motion  Lymphatic: No appreciable lymphadenopathy  Respiratory: Non-labored breathing  Vascular: No edema, swelling or tenderness, except as noted in detailed exam.  Integumentary: No impressive skin lesions present, except as noted in detailed exam.  Neuro: No ataxia or tremors noted  Psych: Normal mood and affect, oriented to person, place and time. Appropriate affect. Musculoskeletal: Normal, except as noted in detailed exam and in HPI.     Examination    Left    Gait Antalgic   Musculoskeletal Tender to palpation at lateral ankle    Skin Normal.      Nails Normal    Range of Motion  20 degrees dorsiflexion, 30 degrees plantarflexion  Subtalar motion: normal    Stability Stable    Muscle Strength 5/5 tibialis anterior  5/5 gastrocnemius-soleus  5/5 posterior tibialis  5/5 peroneal/eversion strength  5/5 EHL  5/5 FHL    Neurologic Normal    Sensation  Intact to light touch throughout sural, saphenous, superficial peroneal, deep peroneal and medial/lateral plantar nerve distributions. Winfield-Maricarmen 5.07 filament (10g) testing deferred. Cardiovascular Brisk capillary refill < 2 seconds,intact DP and PT pulses    Special Tests None      Imaging Studies:   No new imaging      James R. Lachman, MD  Foot & Ankle Surgery   Department of [address] [place] [address] personally performed the service. [employer] Lachman, MD

## 2023-09-07 ENCOUNTER — TELEPHONE (OUTPATIENT)
Age: 68
End: 2023-09-07

## 2023-09-07 DIAGNOSIS — G57.32 PERONEAL MONONEUROPATHY, LEFT: Primary | ICD-10-CM

## 2023-09-07 DIAGNOSIS — G57.30 PERONEAL NEUROPATHY, UNSPECIFIED LATERALITY: ICD-10-CM

## 2023-09-07 NOTE — TELEPHONE ENCOUNTER
Caller: patient    Doctor: Diamante Mcginnis  Reason for call: patient asking about EMG order,  said its supposed to be for both feet and her order says LEFT instead of bilateral.     Patient will cb with fax #     Call back#: [phone]

## 2023-09-07 NOTE — TELEPHONE ENCOUNTER
Caller: KATIE     Doctor: Lachman     Reason for call: Asked for a order for bilateral emg       Fax- [phone]

## 2023-09-07 NOTE — TELEPHONE ENCOUNTER
Caller: Patient    Doctor: Lachman    Reason for call: Asking for status update on request to change the order.  Please contact asap as patient needs to make appt    Call back#: [phone]

## 2023-09-08 ENCOUNTER — TELEPHONE (OUTPATIENT)
Dept: INTERNAL MEDICINE CLINIC | Facility: CLINIC | Age: 68
End: 2023-09-08

## 2023-09-08 ENCOUNTER — APPOINTMENT (OUTPATIENT)
Dept: LAB | Facility: CLINIC | Age: 68
End: 2023-09-08
Payer: MEDICARE

## 2023-09-08 DIAGNOSIS — R73.03 PREDIABETES: ICD-10-CM

## 2023-09-08 DIAGNOSIS — N18.2 CHRONIC KIDNEY DISEASE (CKD), STAGE II (MILD): ICD-10-CM

## 2023-09-08 DIAGNOSIS — E78.2 MIXED HYPERLIPIDEMIA: ICD-10-CM

## 2023-09-08 LAB
ALBUMIN SERPL BCP-MCNC: 4.5 G/DL (ref 3.5–5)
ALP SERPL-CCNC: 60 U/L (ref 34–104)
ALT SERPL W P-5'-P-CCNC: 29 U/L (ref 7–52)
ANION GAP SERPL CALCULATED.3IONS-SCNC: 12 MMOL/L
AST SERPL W P-5'-P-CCNC: 26 U/L (ref 13–39)
BASOPHILS # BLD AUTO: 0.04 THOUSANDS/ÂΜL (ref 0–0.1)
BASOPHILS NFR BLD AUTO: 1 % (ref 0–1)
BILIRUB SERPL-MCNC: 0.75 MG/DL (ref 0.2–1)
BUN SERPL-MCNC: 18 MG/DL (ref 5–25)
CALCIUM SERPL-MCNC: 9.8 MG/DL (ref 8.4–10.2)
CHLORIDE SERPL-SCNC: 99 MMOL/L (ref 96–108)
CHOLEST SERPL-MCNC: 246 MG/DL
CO2 SERPL-SCNC: 26 MMOL/L (ref 21–32)
CREAT SERPL-MCNC: 0.95 MG/DL (ref 0.6–1.3)
EOSINOPHIL # BLD AUTO: 0.04 THOUSAND/ÂΜL (ref 0–0.61)
EOSINOPHIL NFR BLD AUTO: 1 % (ref 0–6)
ERYTHROCYTE [DISTWIDTH] IN BLOOD BY AUTOMATED COUNT: 12.7 % (ref 11.6–15.1)
EST. AVERAGE GLUCOSE BLD GHB EST-MCNC: 134 MG/DL
GFR SERPL CREATININE-BSD FRML MDRD: 62 ML/MIN/1.73SQ M
GLUCOSE P FAST SERPL-MCNC: 107 MG/DL (ref 65–99)
HBA1C MFR BLD: 6.3 %
HCT VFR BLD AUTO: 47.6 % (ref 34.8–46.1)
HDLC SERPL-MCNC: 52 MG/DL
HGB BLD-MCNC: 15.1 G/DL (ref 11.5–15.4)
IMM GRANULOCYTES # BLD AUTO: 0.02 THOUSAND/UL (ref 0–0.2)
IMM GRANULOCYTES NFR BLD AUTO: 0 % (ref 0–2)
LDLC SERPL CALC-MCNC: 139 MG/DL (ref 0–100)
LYMPHOCYTES # BLD AUTO: 2.17 THOUSANDS/ÂΜL (ref 0.6–4.47)
LYMPHOCYTES NFR BLD AUTO: 32 % (ref 14–44)
MCH RBC QN AUTO: 29.7 PG (ref 26.8–34.3)
MCHC RBC AUTO-ENTMCNC: 31.7 G/DL (ref 31.4–37.4)
MCV RBC AUTO: 94 FL (ref 82–98)
MONOCYTES # BLD AUTO: 0.55 THOUSAND/ÂΜL (ref 0.17–1.22)
MONOCYTES NFR BLD AUTO: 8 % (ref 4–12)
NEUTROPHILS # BLD AUTO: 3.89 THOUSANDS/ÂΜL (ref 1.85–7.62)
NEUTS SEG NFR BLD AUTO: 58 % (ref 43–75)
NONHDLC SERPL-MCNC: 194 MG/DL
NRBC BLD AUTO-RTO: 0 /100 WBCS
PLATELET # BLD AUTO: 340 THOUSANDS/UL (ref 149–390)
PMV BLD AUTO: 10.6 FL (ref 8.9–12.7)
POTASSIUM SERPL-SCNC: 4.1 MMOL/L (ref 3.5–5.3)
PROT SERPL-MCNC: 7.5 G/DL (ref 6.4–8.4)
RBC # BLD AUTO: 5.09 MILLION/UL (ref 3.81–5.12)
SODIUM SERPL-SCNC: 137 MMOL/L (ref 135–147)
TRIGL SERPL-MCNC: 276 MG/DL
TSH SERPL DL<=0.05 MIU/L-ACNC: 3.46 UIU/ML (ref 0.45–4.5)
WBC # BLD AUTO: 6.71 THOUSAND/UL (ref 4.31–10.16)

## 2023-09-08 PROCEDURE — 80061 LIPID PANEL: CPT

## 2023-09-08 PROCEDURE — 83036 HEMOGLOBIN GLYCOSYLATED A1C: CPT

## 2023-09-08 PROCEDURE — 85025 COMPLETE CBC W/AUTO DIFF WBC: CPT

## 2023-09-08 PROCEDURE — 36415 COLL VENOUS BLD VENIPUNCTURE: CPT

## 2023-09-08 PROCEDURE — 84443 ASSAY THYROID STIM HORMONE: CPT

## 2023-09-08 PROCEDURE — 80053 COMPREHEN METABOLIC PANEL: CPT

## 2023-09-08 NOTE — TELEPHONE ENCOUNTER
Lab results: Your cholesterol has improved but is still very high. Are you taking atorvastatin (Lipitor)? Your sugars are worse, still in prediabetic range. Kidney function stable. The rest of your labs were normal.    I will see you at your appointment next month.

## 2023-09-08 NOTE — TELEPHONE ENCOUNTER
Caller: Patient     Doctor: Regina Baig     Reason for call: Asked for us to fax order to [address]

## 2023-09-11 NOTE — TELEPHONE ENCOUNTER
Spoke to patient she has not actually started the atorvastatin because she had a lot of other issues going on   She is in a boot and seeing ortho   Will discuss at appointment in October

## 2023-09-15 DIAGNOSIS — I10 ESSENTIAL HYPERTENSION: ICD-10-CM

## 2023-09-15 RX ORDER — RAMIPRIL 10 MG/1
CAPSULE ORAL
Qty: 90 CAPSULE | Refills: 0 | Status: SHIPPED | OUTPATIENT
Start: 2023-09-15

## 2023-09-21 ENCOUNTER — EVALUATION (OUTPATIENT)
Dept: PHYSICAL THERAPY | Facility: CLINIC | Age: 68
End: 2023-09-21
Payer: MEDICARE

## 2023-09-21 DIAGNOSIS — M84.372D: Primary | ICD-10-CM

## 2023-09-21 DIAGNOSIS — M25.571 CHRONIC PAIN OF BOTH ANKLES: ICD-10-CM

## 2023-09-21 DIAGNOSIS — R29.898 LEFT LEG WEAKNESS: ICD-10-CM

## 2023-09-21 DIAGNOSIS — G89.29 CHRONIC PAIN OF BOTH ANKLES: ICD-10-CM

## 2023-09-21 DIAGNOSIS — R29.898 ANKLE WEAKNESS: ICD-10-CM

## 2023-09-21 DIAGNOSIS — M25.572 CHRONIC PAIN OF BOTH ANKLES: ICD-10-CM

## 2023-09-21 PROCEDURE — 97162 PT EVAL MOD COMPLEX 30 MIN: CPT | Performed by: PHYSICAL THERAPIST

## 2023-09-21 NOTE — PROGRESS NOTES
PT Evaluation     Today's date: 2023  Patient name: Denny Houston  : 1955  MRN: [id number]  Referring provider: Estephania Mcleod  Dx:   Encounter Diagnosis     ICD-10-CM    1. Stress fracture of left ankle with routine healing  M84.372D Ambulatory Referral to Physical Therapy      2. Ankle weakness  R29.898 Ambulatory Referral to Physical Therapy      3. Left leg weakness  R29.898 Ambulatory Referral to Physical Therapy      4. Chronic pain of both ankles  M25.571     G89.29     M25.572           Start Time: 0915  Stop Time: 1000  Total time in clinic (min): 45 minutes    Assessment  Assessment details: Denny Houston is a pleasant 79 y.o. female who presents with chronic L ankle pain and adamaris ankle weakness. Patient recently in boot for 6 weeks due to stress fracture. The patient's greatest concerns are worry over not knowing what's wrong, concern at no signs of improvement and fear of not being able to keep active. No further referral appears necessary at this time based upon examination results. Primary movement impairment diagnosis of L ankle ROM resulting in pathoanatomical symptoms of Stress fracture of left ankle with routine healing  (primary encounter diagnosis)  Ankle weakness  Left leg weakness and limiting her ability to exercise or recreation, lift, perform household chores, perform yard work, squat to  objects from the floor, stand and walk. Impairments include:  1) L ankle ROM  2) adamaris ankle strength  3) balance and gait    Discussed risks, benefits, and alternatives to treatment, and answered all patient questions to patient satisfaction.   Impairments: abnormal muscle firing, abnormal muscle tone, abnormal or restricted ROM, abnormal movement, impaired physical strength, lacks appropriate home exercise program, pain with function and poor posture   Understanding of Dx/Px/POC: good   Prognosis: fair    Goals  Impairment Goals 4-6 weeks  - Decrease pain to <3/10  - Improve ankle AROM to equal to the unaffected lower extremity  - Increase ankle strength to 4+/5 throughout  - Increase hip strength to 4/5 throughout    Functional Goals 6-8 weeks  - Return to Prior Level of Function  - Patient will be independent with HEP  - Patient will be able to squat without increased pain/compensation/difficulty  - Patient will be able to perform sit to stand without increased pain/compensation/difficulty   - Patient will be able to ascend and descend stairs without increased pain/compensation/difficulty   - Patient will be able to walk without increase in discomfort      Plan  Plan details: Prognosis above is given PT services 2x/week tapering to 1x/week over the next 2 months and home program adherence. Patient would benefit from: skilled physical therapy  Planned modality interventions: cryotherapy, TENS, thermotherapy: hydrocollator packs and unattended electrical stimulation  Planned therapy interventions: abdominal trunk stabilization, behavior modification, body mechanics training, breathing training, flexibility, functional ROM exercises, home exercise program, joint mobilization, manual therapy, massage, Simmons taping, muscle pump exercises, neuromuscular re-education, patient education, postural training, strengthening, stretching, therapeutic activities, therapeutic exercise, therapeutic training, balance and gait training  Frequency: 2x week  Duration in weeks: 8  Treatment plan discussed with: patient        Subjective Evaluation    History of Present Illness  Mechanism of injury: WORK/SCHOOL: not working  HOME LIFE:  at home, first floor setup  HOBBIES/EXERCISE: no exercise  PLOF:  Long hx of adamaris ankle pain, has osteoporosis, fibromyalgia  HISTORY OF CURRENT INJURY: Patient was treated since April by foot doctor for bilateral achilles and peroneal tendinopathy. She has had the pain for 15+ years.  In April she had a pain in her L ankle that came on suddenly and it felt like it was in the bone. The x-ray did not show anything. She was referred to PT in May for EPAT for her tendinitis, and graston. After the second treatment she had a lot of swelling in her L ankle and she had more pain. She went back to foot doctor who ordered an MRI for her L ankle and foot and was referred to Lachman for future treatment. She was in a CAM boot for 6 weeks after the MRI which showed stress fracture and bone edema. She felt the boot made everything worse and she feels very weak in the L foot. She has since weaned out of the boot since seeing Dr. Maria Dolores Melendez and encouraged to return to PT for ankle strengthening. She is getting an EMG for adamaris feet because she has sensory issues both feet. PAIN LOCATION/DESCRIPTORS: L ankle, top of L foot. Weakness in both ankles  AGGRAVATING FACTORS: walking without CAM boot, balancing on L leg, walking barefoot, standing on one leg when getting into the car, stairs  EASES: boot, compression socks  DAY PATTERN: worse with WB  IMAGING:  MRI L ankle  Showed severe stress reaction L talus, tibiotalar and subtalar joint effusions, tendinosis of achilles tendon, and diffuse subcutaneus edema  SPECIAL QUESTIONS:    Flor Lynch denies a new onset of History of cancer, Tingling and Numbness. PATIENT GOALS: Patient wishes to be able to walk with less pain.      Patient Goals  Patient goals for therapy: decreased pain, improved balance, increased motion, increased strength, independence with ADLs/IADLs and return to sport/leisure activities    Pain  Current pain ratin  At best pain ratin  At worst pain ratin  Location: L foot adn ankle  Quality: sharp, discomfort and dull ache  Progression: worsening          Objective     Active Range of Motion   Left Ankle/Foot   Dorsiflexion (ke): -15 degrees   Plantar flexion: 40 degrees   Inversion: 20 degrees   Eversion: 10 degrees     Right Ankle/Foot   Dorsiflexion (ke): 4 degrees   Plantar flexion: 55 degrees   Inversion: 32 degrees   Eversion: 15 degrees     Strength/Myotome Testing     Left Ankle/Foot   Dorsiflexion: 4-  Plantar flexion: 4-  Inversion: 4-  Eversion: 4-    Right Ankle/Foot   Dorsiflexion: 4  Plantar flexion: 4  Inversion: 4  Eversion: 4    Additional Strength Details  Hip and knee strength: 4/5 adamaris no pain    Tests   Left Ankle/Foot   Positive for calcaneal squeeze. Swelling   Left Ankle/Foot   Figure 8: 46.5 cm    Right Ankle/Foot   Figure 8: 46.7 cm    General Comments: Ankle/Foot Comments   Ambulation: antalgic favoring L foot  4 step balance test: 25/40, SLS 1 sec on L and 4 on R.                 Diagnosis: L ankle stress fx, adamaris ankle weakness   Precautions: WBAT, has osteoporosis   Primary Goals: 1) L ankle ROM  2) adamaris ankle strength  3) balance and gait   *asterisks by exercise = given for HEP   Manuals 9/21       PROM        Gentle joint mobs                                There Ex        biek vs TM        ABCs        BAPS board        Towel calf S        Towel soleus S        4 way ankle bands                                Neuro Re-Ed        Towel scrunches        Short foot        Biodex        Tandem stance        Standing on foam        SLS        Leg press HR                                                 Re-evaluation              Ther Act                                         Modalities

## 2023-09-22 ENCOUNTER — OFFICE VISIT (OUTPATIENT)
Dept: PHYSICAL THERAPY | Facility: CLINIC | Age: 68
End: 2023-09-22
Payer: MEDICARE

## 2023-09-22 DIAGNOSIS — R29.898 LEFT LEG WEAKNESS: ICD-10-CM

## 2023-09-22 DIAGNOSIS — M25.571 CHRONIC PAIN OF BOTH ANKLES: ICD-10-CM

## 2023-09-22 DIAGNOSIS — R29.898 ANKLE WEAKNESS: ICD-10-CM

## 2023-09-22 DIAGNOSIS — M25.572 CHRONIC PAIN OF BOTH ANKLES: ICD-10-CM

## 2023-09-22 DIAGNOSIS — G89.29 CHRONIC PAIN OF BOTH ANKLES: ICD-10-CM

## 2023-09-22 DIAGNOSIS — M84.372D: Primary | ICD-10-CM

## 2023-09-22 PROCEDURE — 97110 THERAPEUTIC EXERCISES: CPT

## 2023-09-22 PROCEDURE — 97140 MANUAL THERAPY 1/> REGIONS: CPT

## 2023-09-22 PROCEDURE — 97112 NEUROMUSCULAR REEDUCATION: CPT

## 2023-09-22 NOTE — PROGRESS NOTES
Daily Note     Today's date: 2023  Patient name: Meagan Cheung  : 1955  MRN: [id number]  Referring provider: Aarti Turk  Dx:   Encounter Diagnosis     ICD-10-CM    1. Stress fracture of left ankle with routine healing  M84.372D       2. Ankle weakness  R29.898       3. Left leg weakness  R29.898       4. Chronic pain of both ankles  M25.571     G89.29     M25.572           Start Time: 0915  Stop Time: 1000  Total time in clinic (min): 45 minutes    Subjective: Patient states she feels unsteady on her feet. She states standing in the shower is even hard. She states she is not wearing her compression stocking today. Objective: See treatment diary below      Assessment: Initiated outlined program. Patient has noticeable ROM restrictions with beginning exercises this visit. She is guarded with PROM in all planes with cues to relax; no complaints of pain throughout. Patient notes discomfort at anterior ankle on the L with stretches, but this does improve. She tolerated session with fair tolerance overall. Will continue with address ROM and slowly progress to WB exercises within tolerance. Plan: Progress treatment as tolerated.        Diagnosis: L ankle stress fx, adamaris ankle weakness   Precautions: WBAT, has osteoporosis   Primary Goals: 1) L ankle ROM  2) adamaris ankle strength  3) balance and gait   *asterisks by exercise = given for HEP   Manuals       PROM  KK, PTA       Gentle joint mobs                                There Ex        bike vs TM  5 min upright bike      ABCs  1x ea adamaris       BAPS board  20x cw/ccw      Strap calf S  3x30 sec adaamris       Strap soleus S  3x30 sec adamaris       4 way ankle bands                                Neuro Re-Ed        Towel scrunches  2 min       Short foot  2 min       Biodex        Tandem stance        Standing on foam        SLS        Leg press HR                                                 Re-evaluation              Ther Act Modalities

## 2023-09-25 ENCOUNTER — OFFICE VISIT (OUTPATIENT)
Dept: PHYSICAL THERAPY | Facility: CLINIC | Age: 68
End: 2023-09-25
Payer: MEDICARE

## 2023-09-25 DIAGNOSIS — M25.572 CHRONIC PAIN OF BOTH ANKLES: ICD-10-CM

## 2023-09-25 DIAGNOSIS — R29.898 ANKLE WEAKNESS: ICD-10-CM

## 2023-09-25 DIAGNOSIS — R29.898 LEFT LEG WEAKNESS: ICD-10-CM

## 2023-09-25 DIAGNOSIS — M25.571 CHRONIC PAIN OF BOTH ANKLES: ICD-10-CM

## 2023-09-25 DIAGNOSIS — G89.29 CHRONIC PAIN OF BOTH ANKLES: ICD-10-CM

## 2023-09-25 DIAGNOSIS — M84.372D: Primary | ICD-10-CM

## 2023-09-25 PROCEDURE — 97110 THERAPEUTIC EXERCISES: CPT | Performed by: PHYSICAL THERAPIST

## 2023-09-25 PROCEDURE — 97112 NEUROMUSCULAR REEDUCATION: CPT | Performed by: PHYSICAL THERAPIST

## 2023-09-25 NOTE — PROGRESS NOTES
Daily Note     Today's date: 2023  Patient name: Denny Houston  : 1955  MRN: [id number]  Referring provider: Estephania Mcleod  Dx:   Encounter Diagnosis     ICD-10-CM    1. Stress fracture of left ankle with routine healing  M84.372D       2. Ankle weakness  R29.898       3. Left leg weakness  R29.898       4. Chronic pain of both ankles  M25.571     G89.29     M25.572           Start Time: 1615  Stop Time: 1700  Total time in clinic (min): 45 minutes    Subjective: Patient reports she is very discouraged and frustrated, as her ankle pain has not gotten any better since the beginning of the year. Objective: See treatment diary below    Assessment: Patient tolerated exercises today fairly well, but did have some discomfort in both ankles following visit. Updated HEP as patient has not been performing any exercises beyond ankle pumps. Plan: Progress treatment as tolerated.        Diagnosis: L ankle stress fx, adamaris ankle weakness   Precautions: WBAT, has osteoporosis   Primary Goals: 1) L ankle ROM  2) adamaris ankle strength  3) balance and gait   *asterisks by exercise = given for HEP   Manuals      PROM  KK, PTA       Gentle joint mobs                                There Ex        bike vs TM  5 min upright bike 5 min bike     ABCs  1x ea adamaris       BAPS board  20x cw/ccw 30 cw/ccw     Strap calf S*  3x30 sec adamaris  3x30 sec adamaris standing     Strap soleus S  3x30 sec adamaris       4 way ankle bands   X 20 ea direction adamaris  YTB                             Neuro Re-Ed        Towel scrunches*  2 min       Short foot  2 min       Biodex        Tandem stance*   HEP     Standing on foam   FT EO FT  Tandem 2x30 sec ea     SLS        Leg press HR   30# 2x10 adamaris legs                                              Re-evaluation              Ther Act                                         Modalities

## 2023-09-28 ENCOUNTER — OFFICE VISIT (OUTPATIENT)
Dept: PHYSICAL THERAPY | Facility: CLINIC | Age: 68
End: 2023-09-28
Payer: MEDICARE

## 2023-09-28 DIAGNOSIS — G89.29 CHRONIC PAIN OF BOTH ANKLES: ICD-10-CM

## 2023-09-28 DIAGNOSIS — R29.898 ANKLE WEAKNESS: ICD-10-CM

## 2023-09-28 DIAGNOSIS — R29.898 LEFT LEG WEAKNESS: ICD-10-CM

## 2023-09-28 DIAGNOSIS — M84.372D: Primary | ICD-10-CM

## 2023-09-28 DIAGNOSIS — M25.572 CHRONIC PAIN OF BOTH ANKLES: ICD-10-CM

## 2023-09-28 DIAGNOSIS — M25.571 CHRONIC PAIN OF BOTH ANKLES: ICD-10-CM

## 2023-09-28 PROCEDURE — 97110 THERAPEUTIC EXERCISES: CPT | Performed by: PHYSICAL THERAPIST

## 2023-09-28 PROCEDURE — 97112 NEUROMUSCULAR REEDUCATION: CPT | Performed by: PHYSICAL THERAPIST

## 2023-09-28 NOTE — PROGRESS NOTES
Daily Note     Today's date: 2023  Patient name: Elliott Cruz  : 1955  MRN: [id number]  Referring provider: Paola Mosqueda  Dx:   Encounter Diagnosis     ICD-10-CM    1. Stress fracture of left ankle with routine healing  M84.372D       2. Ankle weakness  R29.898       3. Left leg weakness  R29.898       4. Chronic pain of both ankles  M25.571     G89.29     M25.572           Start Time: 1045  Stop Time: 1130  Total time in clinic (min): 45 minutes    Subjective: Patient has the same pain again. She has constant pain in the L foot when she is on her feet. Objective: See treatment diary below    Assessment: PT recommended looking for a shower mat for cushioning or wearing safe shoes for cushioning if needed. She continues to have pain with most exercises and movements in NWB and WB but was able to make progressions in hip strengthening for improved balance and strength of LE. Exercises performed bilaterally to address chronic pain of both ankles. Plan: Continue per plan of care.       Diagnosis: L ankle stress fx, adamaris ankle weakness   Precautions: WBAT, has osteoporosis   Primary Goals: 1) L ankle ROM  2) adamaris ankle strength  3) balance and gait   *asterisks by exercise = given for HEP   Manuals     PROM  KK, PTA       Gentle joint mobs                                There Ex        bike vs TM  5 min upright bike 5 min bike 5 min bike    ABCs  1x ea adamaris       BAPS board  20x cw/ccw 30 cw/ccw 30 cw/ccw adamaris    Strap calf S*  3x30 sec adamaris  3x30 sec adamaris standing 3x30 sec adamaris standing    Strap soleus S  3x30 sec adamaris       4 way ankle bands   X 20 ea direction adamaris  YTB X 30 ea direction adamaris  RTB                            Neuro Re-Ed        Towel scrunches*  2 min       Short foot  2 min       Biodex        Tandem stance*   HEP     Standing on foam   FT EO FT  Tandem 2x30 sec ea     SLS        Leg press HR   30# 2x10 adamaris legs     SLR    2x10 adamaris    SL SLR        Bridge    2x10 Clam    2x10 YTB adamaris             Re-evaluation              Ther Act                                         Modalities

## 2023-09-29 ENCOUNTER — HOSPITAL ENCOUNTER (OUTPATIENT)
Dept: MRI IMAGING | Facility: HOSPITAL | Age: 68
End: 2023-09-29
Payer: MEDICARE

## 2023-09-29 DIAGNOSIS — M76.61 TENDONITIS, ACHILLES, RIGHT: ICD-10-CM

## 2023-09-29 DIAGNOSIS — M25.571 RIGHT ANKLE PAIN, UNSPECIFIED CHRONICITY: ICD-10-CM

## 2023-09-29 DIAGNOSIS — M25.571 SINUS TARSI SYNDROME OF RIGHT ANKLE: ICD-10-CM

## 2023-09-29 PROCEDURE — 73721 MRI JNT OF LWR EXTRE W/O DYE: CPT

## 2023-10-02 ENCOUNTER — OFFICE VISIT (OUTPATIENT)
Dept: PHYSICAL THERAPY | Facility: CLINIC | Age: 68
End: 2023-10-02
Payer: MEDICARE

## 2023-10-02 ENCOUNTER — HOSPITAL ENCOUNTER (OUTPATIENT)
Dept: RADIOLOGY | Age: 68
Discharge: HOME | End: 2023-10-02
Payer: MEDICARE

## 2023-10-02 DIAGNOSIS — Z12.31 SCREENING MAMMOGRAM, ENCOUNTER FOR: ICD-10-CM

## 2023-10-02 DIAGNOSIS — G89.29 CHRONIC PAIN OF BOTH ANKLES: ICD-10-CM

## 2023-10-02 DIAGNOSIS — M84.372D: Primary | ICD-10-CM

## 2023-10-02 DIAGNOSIS — M25.572 CHRONIC PAIN OF BOTH ANKLES: ICD-10-CM

## 2023-10-02 DIAGNOSIS — R29.898 LEFT LEG WEAKNESS: ICD-10-CM

## 2023-10-02 DIAGNOSIS — R29.898 ANKLE WEAKNESS: ICD-10-CM

## 2023-10-02 DIAGNOSIS — Z12.31 ENCOUNTER FOR SCREENING MAMMOGRAM FOR MALIGNANT NEOPLASM OF BREAST: ICD-10-CM

## 2023-10-02 DIAGNOSIS — M25.571 CHRONIC PAIN OF BOTH ANKLES: ICD-10-CM

## 2023-10-02 PROCEDURE — 77063 BREAST TOMOSYNTHESIS BI: CPT

## 2023-10-02 PROCEDURE — 97112 NEUROMUSCULAR REEDUCATION: CPT

## 2023-10-02 PROCEDURE — 97110 THERAPEUTIC EXERCISES: CPT

## 2023-10-02 PROCEDURE — 97140 MANUAL THERAPY 1/> REGIONS: CPT

## 2023-10-02 PROCEDURE — 77067 SCR MAMMO BI INCL CAD: CPT

## 2023-10-02 NOTE — PROGRESS NOTES
Daily Note     Today's date: 10/2/2023  Patient name: Joe Stager  : 1955  MRN: [id number]  Referring provider: Marlon Hamilton  Dx:   Encounter Diagnosis     ICD-10-CM    1. Stress fracture of left ankle with routine healing  M84.372D       2. Ankle weakness  R29.898       3. Left leg weakness  R29.898       4. Chronic pain of both ankles  M25.571     G89.29     M25.572           Start Time: 1700  Stop Time: 1741  Total time in clinic (min): 41 minutes    Subjective: Patient states she was standing in the shower and she noticed she had a little more strength in her L leg which she had not felt before. Objective: See treatment diary below      Assessment: Continued with outlined program. Patient complains of anterior ankle pain with transfers and WB; trialed gentle DF mobs with a 17 degree improvement since her evaluation. Patient was able to tolerate WB following manuals performed. She does lean into DF with increase pain, but much better than when arrived in PT. Patient was able to perform SLS with balance deficits but less pain symptoms. She has overall small improvements this visit which was encouraging to patient. Will continue to progress within tolerance. Plan: Progress treatment as tolerated.        Diagnosis: L ankle stress fx, adamaris ankle weakness   Precautions: WBAT, has osteoporosis   Primary Goals: 1) L ankle ROM  2) adamaris ankle strength  3) balance and gait   *asterisks by exercise = given for HEP   Manuals 9/21 9/22 9/25 9/28 10/2   PROM  KK, PTA       Gentle joint mobs- DO EVERY VISIT     IM, PT Grade 1-3  -2* DF following                           There Ex        bike vs TM  5 min upright bike 5 min bike 5 min bike 5 min bike    ABCs  1x ea adamaris       BAPS board  20x cw/ccw 30 cw/ccw 30 cw/ccw adamaris    Strap calf S*  3x30 sec adamaris  3x30 sec adamaris standing 3x30 sec adamaris standing    Strap soleus S  3x30 sec adamaris       4 way ankle bands   X 20 ea direction adamaris  YTB X 30 ea direction adamaris RTB x30 ea direction                           Neuro Re-Ed        Towel scrunches*  2 min       Short foot  2 min       Biodex        Tandem stance*   HEP     Standing on foam   FT EO FT  Tandem 2x30 sec ea  Tandem on foam 2x30 sec ea    FT EC on foam 2x30 sec    SLS     2x30 sec L   Leg press HR   30# 2x10 adamaris legs     SLR    2x10 adamaris 2x10 adamaris    SL SLR        Bridge    2x10     Clam    2x10 YTB adamarsi             Re-evaluation              Ther Act             Pt education         KK, PTA                  Modalities

## 2023-10-05 ENCOUNTER — OFFICE VISIT (OUTPATIENT)
Dept: PHYSICAL THERAPY | Facility: CLINIC | Age: 68
End: 2023-10-05
Payer: MEDICARE

## 2023-10-05 DIAGNOSIS — R29.898 ANKLE WEAKNESS: ICD-10-CM

## 2023-10-05 DIAGNOSIS — M84.372D: Primary | ICD-10-CM

## 2023-10-05 DIAGNOSIS — R29.898 LEFT LEG WEAKNESS: ICD-10-CM

## 2023-10-05 DIAGNOSIS — M25.571 CHRONIC PAIN OF BOTH ANKLES: ICD-10-CM

## 2023-10-05 DIAGNOSIS — G89.29 CHRONIC PAIN OF BOTH ANKLES: ICD-10-CM

## 2023-10-05 DIAGNOSIS — M25.572 CHRONIC PAIN OF BOTH ANKLES: ICD-10-CM

## 2023-10-05 PROCEDURE — 97530 THERAPEUTIC ACTIVITIES: CPT

## 2023-10-05 PROCEDURE — 97112 NEUROMUSCULAR REEDUCATION: CPT

## 2023-10-05 NOTE — PROGRESS NOTES
Daily Note     Today's date: 10/5/2023  Patient name: Carola Najjar  : 1955  MRN: [id number]  Referring provider: Kellie Duke  Dx:   Encounter Diagnosis     ICD-10-CM    1. Stress fracture of left ankle with routine healing  M84.372D       2. Ankle weakness  R29.898       3. Left leg weakness  R29.898       4. Chronic pain of both ankles  M25.571     G89.29     M25.572           Start Time: 1400  Stop Time: 1445  Total time in clinic (min): 45 minutes    Subjective: Patient states she has been in so much pain since her last session. She states she was swollen and just iced since. She did have to put her copper socks on for support. Objective: See treatment diary below      Assessment: Patient states her referring physician called to review her MRI results for R foot with her however elaborated for patient with better understanding. Discussed over icing with patient as well as she is attempting to rid the edema and control the pain, but is limiting her ability to improve ROM and overall strength and stability. She was able to tolerate gentle joint mobs to continue to improve her dorsiflexion for L ankle. She notes pain through her great toe with balance on the foam, but did perform with fair stability and intermittent support with pain. Continued to educate patient on strength, stability and flexibility improvements for support and decrease compensation. Will continue to assess and progress. She understands she will have pain with activity intermittently at this time. Plan: Progress treatment as tolerated.        Diagnosis: L ankle stress fx, adamaris ankle weakness   Precautions: WBAT, has osteoporosis   Primary Goals: 1) L ankle ROM  2) adamaris ankle strength  3) balance and gait   *asterisks by exercise = given for HEP   Manuals 10/5 9/22 9/25 9/28 10/2   PROM  KK, PTA       Gentle joint mobs IM, PT Grade 1-3     IM, PT Grade 1-3  -2* DF following                           There Ex        bike vs TM  5 min upright bike 5 min bike 5 min bike 5 min bike    ABCs  1x ea adamaris       BAPS board  20x cw/ccw 30 cw/ccw 30 cw/ccw adamaris    Strap calf S*  3x30 sec adamaris  3x30 sec adamaris standing 3x30 sec adamaris standing    Strap soleus S  3x30 sec adamaris       4 way ankle bands x30 no band   X 20 ea direction adamaris  YTB X 30 ea direction adamaris  RTB x30 ea direction                           Neuro Re-Ed        Towel scrunches*  2 min       Short foot  2 min       Biodex        Tandem stance*   HEP     Standing on foam Tandem on foam 2x30 sec ea   FT EO FT  Tandem 2x30 sec ea  Tandem on foam 2x30 sec ea    FT EC on foam 2x30 sec    SLS     2x30 sec L   Leg press HR   30# 2x10 adamaris legs     SLR    2x10 adamaris 2x10 adamaris    SL SLR        Bridge    2x10     Clam    2x10 YTB adamaris             Re-evaluation              Ther Act             Pt education KK, PTA        KK, PTA                  Modalities

## 2023-10-09 ENCOUNTER — OFFICE VISIT (OUTPATIENT)
Dept: PHYSICAL THERAPY | Facility: CLINIC | Age: 68
End: 2023-10-09
Payer: MEDICARE

## 2023-10-09 DIAGNOSIS — G89.29 CHRONIC PAIN OF BOTH ANKLES: ICD-10-CM

## 2023-10-09 DIAGNOSIS — M25.571 CHRONIC PAIN OF BOTH ANKLES: ICD-10-CM

## 2023-10-09 DIAGNOSIS — R29.898 ANKLE WEAKNESS: ICD-10-CM

## 2023-10-09 DIAGNOSIS — R29.898 LEFT LEG WEAKNESS: ICD-10-CM

## 2023-10-09 DIAGNOSIS — M25.572 CHRONIC PAIN OF BOTH ANKLES: ICD-10-CM

## 2023-10-09 DIAGNOSIS — M84.372D: Primary | ICD-10-CM

## 2023-10-09 PROCEDURE — 97110 THERAPEUTIC EXERCISES: CPT | Performed by: PHYSICAL THERAPIST

## 2023-10-09 PROCEDURE — 97140 MANUAL THERAPY 1/> REGIONS: CPT | Performed by: PHYSICAL THERAPIST

## 2023-10-09 NOTE — PROGRESS NOTES
Daily Note     Today's date: 10/9/2023  Patient name: Elliott Cruz  : 1955  MRN: [id number]  Referring provider: Paola Mosqueda  Dx:   Encounter Diagnosis     ICD-10-CM    1. Stress fracture of left ankle with routine healing  M84.372D       2. Ankle weakness  R29.898       3. Left leg weakness  R29.898       4. Chronic pain of both ankles  M25.571     G89.29     M25.572                      Subjective: Patient states significant swelling in her left ankle by the end of the day. Objective: See treatment diary below      Assessment: Patient with photo of left ankle at end of day, significant edema at ankle with most notably in area of lateral Malleolus; focused on ROM activities this visit; minimal pain with manual PROM. Plan: Progress treatment as tolerated.        Diagnosis: L ankle stress fx, adamaris ankle weakness   Precautions: WBAT, has osteoporosis   Primary Goals: 1) L ankle ROM  2) adamaris ankle strength  3) balance and gait   *asterisks by exercise = given for HEP   Manuals 10/5 10/9 9/25 9/28 10/2   PROM  KK, PT      Gentle joint mobs IM, PT Grade 1-3  KK, PT Grade 1-3   IM, PT Grade 1-3  -2* DF following                           There Ex        bike vs TM  5 min upright bike 5 min bike 5 min bike 5 min bike    ABCs        BAPS board   30 cw/ccw 30 cw/ccw adamaris    Strap calf S*  3x30 sec 3x30 sec adamaris standing 3x30 sec adamaris standing    Strap soleus S  3x30 sec      4 way ankle bands x30 no band   X 20 ea direction adamaris  YTB X 30 ea direction adamaris  RTB x30 ea direction   Sitting heel slide for DF ROM   20x10"                      Neuro Re-Ed        Towel scrunches*        Short foot        Biodex        Tandem stance*   HEP     Standing on foam Tandem on foam 2x30 sec ea   FT EO FT  Tandem 2x30 sec ea  Tandem on foam 2x30 sec ea    FT EC on foam 2x30 sec    SLS     2x30 sec L   Leg press HR   30# 2x10 adamaris legs     SLR    2x10 adamaris 2x10 adamaris    SL SLR        Bridge    2x10     Clam    2x10 YTB adamaris             Re-evaluation              Ther Act             Pt education KK, PTA        KK, PTA                  Modalities

## 2023-10-12 ENCOUNTER — OFFICE VISIT (OUTPATIENT)
Dept: PHYSICAL THERAPY | Facility: CLINIC | Age: 68
End: 2023-10-12
Payer: MEDICARE

## 2023-10-12 DIAGNOSIS — M84.372D: Primary | ICD-10-CM

## 2023-10-12 PROCEDURE — 97140 MANUAL THERAPY 1/> REGIONS: CPT

## 2023-10-12 PROCEDURE — 97110 THERAPEUTIC EXERCISES: CPT

## 2023-10-12 NOTE — PROGRESS NOTES
Daily Note     Today's date: 10/12/2023  Patient name: Tamara Dey  : 1955  MRN: [id number]  Referring provider: Ramakrishna Elaine  Dx:   Encounter Diagnosis     ICD-10-CM    1. Stress fracture of left ankle with routine healing  M84.372D                      Subjective: Patient state that she was on foot more yesterday. She states that she is not "getting better" and chronic pain continues at anterior of foot to toes. She state "top of foot into ankle is swollen and painful into toes."      Objective: See treatment diary below      Assessment: Tolerated treatment fair. Gentle STM and glides initiated prior to PROM. Improve gaurding post manual stretching but pain and swelling persists. Patient would benefit from continued PT      Plan: Continue per plan of care.       Diagnosis: L ankle stress fx, adamaris ankle weakness   Precautions: WBAT, has osteoporosis   Primary Goals: 1) L ankle ROM  2) adamaris ankle strength  3) balance and gait   *asterisks by exercise = given for HEP   Manuals 10/5 10/9 10/12  10/2   PROM  KK, PT PROM + STM     Gentle joint mobs IM, PT Grade 1-3  KK, PT Grade 1-3 GLIDES  IM, PT Grade 1-3  -2* DF following                           There Ex        bike vs TM  5 min upright bike 5'   5 min bike    ABCs        BAPS board        Strap calf S*  3x30 sec 3x30 sec     Strap soleus S  3x30 sec 3x30 sec     4 way ankle bands x30 no band     x30 ea direction   Sitting heel slide for DF ROM   20x10" 20x10"                     Neuro Re-Ed        Towel scrunches*        Short foot        Biodex        Tandem stance*        Standing on foam Tandem on foam 2x30 sec ea     Tandem on foam 2x30 sec ea    FT EC on foam 2x30 sec    SLS     2x30 sec L   Leg press HR        SLR     2x10 adamaris    SL SLR        Bridge        Clam                 Re-evaluation            Ther Act            Pt education KK, PTA       KK, PTA                 Modalities

## 2023-10-16 ENCOUNTER — EVALUATION (OUTPATIENT)
Dept: PHYSICAL THERAPY | Facility: CLINIC | Age: 68
End: 2023-10-16
Payer: MEDICARE

## 2023-10-16 DIAGNOSIS — M84.372D: Primary | ICD-10-CM

## 2023-10-16 DIAGNOSIS — G89.29 CHRONIC PAIN OF BOTH ANKLES: ICD-10-CM

## 2023-10-16 DIAGNOSIS — R29.898 LEFT LEG WEAKNESS: ICD-10-CM

## 2023-10-16 DIAGNOSIS — M25.572 CHRONIC PAIN OF BOTH ANKLES: ICD-10-CM

## 2023-10-16 DIAGNOSIS — R29.898 ANKLE WEAKNESS: ICD-10-CM

## 2023-10-16 DIAGNOSIS — M25.571 CHRONIC PAIN OF BOTH ANKLES: ICD-10-CM

## 2023-10-16 PROCEDURE — 97110 THERAPEUTIC EXERCISES: CPT | Performed by: PHYSICAL THERAPIST

## 2023-10-16 PROCEDURE — 97112 NEUROMUSCULAR REEDUCATION: CPT | Performed by: PHYSICAL THERAPIST

## 2023-10-16 NOTE — PROGRESS NOTES
PT Discharge    Today's date: 10/16/2023  Patient name: Rebecca Whitehead  : 1955  MRN: [id number]  Referring provider: Radha Hall  Dx:   Encounter Diagnosis     ICD-10-CM    1. Stress fracture of left ankle with routine healing  M84.372D       2. Ankle weakness  R29.898       3. Left leg weakness  R29.898       4. Chronic pain of both ankles  M25.571     G89.29     M25.572           Start Time: 1400  Stop Time: 1445  Total time in clinic (min): 45 minutes    Assessment  Assessment details: Rebecca Whitehead is a pleasant 79 y.o. female who presents to RE with chronic L ankle pain and adamaris ankle weakness. Patient presents with improved ROM of L ankle, but no improvement with pain in WB and with overall function. Her pain is in the bottom of her foot and top of her foot, but no longer the ankle. At this point, patient would benefit from referral back to MD to discuss future treatment options as her pain is not improving with skilled PT.    Impairments: abnormal muscle firing, abnormal muscle tone, abnormal or restricted ROM, abnormal movement, impaired physical strength, lacks appropriate home exercise program, pain with function and poor posture   Understanding of Dx/Px/POC: good   Prognosis: fair    Goals  Impairment Goals 4-6 weeks  - Decrease pain to <3/10 - not met  - Improve ankle AROM to equal to the unaffected lower extremity - met  - Increase ankle strength to 4+/5 throughout - not met  - Increase hip strength to 4/5 throughout - not met    Functional Goals 6-8 weeks  - Return to Prior Level of Function - not met  - Patient will be independent with HEP - met  - Patient will be able to squat without increased pain/compensation/difficulty - not met  - Patient will be able to perform sit to stand without increased pain/compensation/difficulty - partially met  - Patient will be able to ascend and descend stairs without increased pain/compensation/difficulty - not met  - Patient will be able to walk without increase in discomfort - not met      Plan  Planned therapy interventions: home exercise program        Subjective Evaluation    History of Present Illness  Mechanism of injury: WORK/SCHOOL: not working  HOME LIFE:  at home, first floor setup  HOBBIES/EXERCISE: no exercise  PLOF:  Long hx of gary ankle pain, has osteoporosis, fibromyalgia  HISTORY OF CURRENT INJURY: Patient was treated since April by foot doctor for bilateral achilles and peroneal tendinopathy. She has had the pain for 15+ years. In April she had a pain in her L ankle that came on suddenly and it felt like it was in the bone. The x-ray did not show anything. She was referred to PT in May for EPAT for her tendinitis, and graston. After the second treatment she had a lot of swelling in her L ankle and she had more pain. She went back to foot doctor who ordered an MRI for her L ankle and foot and was referred to [facility] for future treatment. She was in a CAM boot for 6 weeks after the MRI which showed stress fracture and bone edema. She felt the boot made everything worse and she feels very weak in the L foot. She has since weaned out of the boot since seeing Dr. Debbie Orr and encouraged to return to PT for ankle strengthening. She is getting an EMG for gary feet because she has sensory issues both feet. Update: Patient reports she feels 30% improved at this time. She has pain and swelling and difficulty walking just like she did when PT started. She has a little less pain across her ankle, and has most of her pain on the sie of her ankle and the top of her foot. She sees Dr. Debbie Orr on Wednesday. She has notices more swelling in the last 2 weeks. PAIN LOCATION/DESCRIPTORS: L ankle, top of L foot. Weakness in both ankles. Gary achilles pain.   AGGRAVATING FACTORS: walking, standing for a while, balancing on L leg, walking barefoot, standing on one leg when getting into the car, stairs, standing in the shower  Improved: ROM of L ankle  EASES: compression socks, wearing supportive shoes  DAY PATTERN: worse with WB  IMAGING:  MRI L ankle  Showed severe stress reaction L talus, tibiotalar and subtalar joint effusions, tendinosis of achilles tendon, and diffuse subcutaneus edema  SPECIAL QUESTIONS:    Peyton Wright denies a new onset of History of cancer, Tingling and Numbness. PATIENT GOALS: Patient wishes to be able to walk with less pain. - 30% improved    Patient Goals  Patient goals for therapy: decreased pain, improved balance, increased motion, increased strength, independence with ADLs/IADLs and return to sport/leisure activities    Pain  Current pain ratin  At best pain ratin  At worst pain ratin  Location: L foot  Quality: discomfort, dull ache and sharp  Progression: no change          Objective     Active Range of Motion   Left Ankle/Foot   Dorsiflexion (ke): 4 degrees   Plantar flexion: 45 degrees   Inversion: 30 degrees   Eversion: 15 degrees     Right Ankle/Foot   Dorsiflexion (ke): 4 degrees   Plantar flexion: 55 degrees   Inversion: 32 degrees   Eversion: 15 degrees     Strength/Myotome Testing     Left Ankle/Foot   Dorsiflexion: 4-  Plantar flexion: 4-  Inversion: 4-  Eversion: 4-    Right Ankle/Foot   Dorsiflexion: 4  Plantar flexion: 4  Inversion: 4  Eversion: 4    Additional Strength Details  Hip and knee strength: 4/5 adamaris no pain  Pain with MMT on top of foot    Tests   Left Ankle/Foot   Positive for calcaneal squeeze. Swelling   Left Ankle/Foot   Figure 8: 46.2 cm  Malleoli: 19.6 (metatarsals) cm    Right Ankle/Foot   Figure 8: 47 cm  Malleoli: 19.3 (metatarsals) cm    General Comments:       Ankle/Foot Comments   Ambulation: antalgic favoring L foot  4 step balance test: 25/40, SLS 1 sec on L with pain and 7 sec on R               Diagnosis: L ankle stress fx, adamaris ankle weakness   Precautions: WBAT, has osteoporosis   Primary Goals: 1) L ankle ROM  2) adamaris ankle strength  3) balance and gait   *asterisks by exercise = given for HEP   Manuals 10/5 10/9 10/12 10/16 10/2   PROM  KK, PT PROM + STM     Gentle joint mobs IM, PT Grade 1-3  KK, PT Grade 1-3 GLIDES  IM, PT Grade 1-3  -2* DF following                           There Ex        bike vs TM  5 min upright bike 5'  5 min recumbent bike 5 min bike    ABCs        BAPS board        Strap calf S*  3x30 sec 3x30 sec     Strap soleus S  3x30 sec 3x30 sec     4 way ankle bands x30 no band     x30 ea direction   Sitting heel slide for DF ROM   20x10" 20x10"                     Neuro Re-Ed        Towel scrunches*        Short foot        Biodex        Tandem stance*        Standing on foam Tandem on foam 2x30 sec ea     Tandem on foam 2x30 sec ea    FT EC on foam 2x30 sec    SLS     2x30 sec L   Leg press HR        SLR     2x10 adamaris    SL SLR        Bridge        Clam                 Re-evaluation      IM, PT      Ther Act            Pt education KK, PTA      IM, PT KK, PTA                 Modalities

## 2023-10-18 ENCOUNTER — APPOINTMENT (OUTPATIENT)
Dept: RADIOLOGY | Facility: AMBULARY SURGERY CENTER | Age: 68
End: 2023-10-18
Attending: ORTHOPAEDIC SURGERY
Payer: MEDICARE

## 2023-10-18 ENCOUNTER — OFFICE VISIT (OUTPATIENT)
Dept: OBGYN CLINIC | Facility: CLINIC | Age: 68
End: 2023-10-18
Payer: MEDICARE

## 2023-10-18 VITALS
WEIGHT: 200 LBS | HEIGHT: 68 IN | SYSTOLIC BLOOD PRESSURE: 138 MMHG | BODY MASS INDEX: 30.31 KG/M2 | HEART RATE: 102 BPM | DIASTOLIC BLOOD PRESSURE: 82 MMHG

## 2023-10-18 DIAGNOSIS — M79.672 PAIN IN LEFT FOOT: ICD-10-CM

## 2023-10-18 DIAGNOSIS — Z01.89 ENCOUNTER FOR LOWER EXTREMITY COMPARISON IMAGING STUDY: ICD-10-CM

## 2023-10-18 DIAGNOSIS — M84.372D: Primary | ICD-10-CM

## 2023-10-18 PROCEDURE — 73620 X-RAY EXAM OF FOOT: CPT

## 2023-10-18 PROCEDURE — 99213 OFFICE O/P EST LOW 20 MIN: CPT | Performed by: ORTHOPAEDIC SURGERY

## 2023-10-18 PROCEDURE — 73630 X-RAY EXAM OF FOOT: CPT

## 2023-10-18 NOTE — PROGRESS NOTES
Zulma Zimmerman M.D. Attending, Orthopaedic Surgery  [facility]      ORTHOPAEDIC FOOT AND ANKLE CLINIC VISIT     Assessment:     Encounter Diagnoses   Name Primary? Stress fracture of left ankle with routine healing Yes    Pain in left foot     Encounter for lower extremity comparison imaging study             Plan:   The patient verbalized understanding of exam findings and treatment plan. We engaged in the shared decision-making process and treatment options were discussed at length with the patient. Surgical and conservative management discussed today along with risks and benefits. She has been treating for a stress reaction of her left talus. She denies ankle pain on today's examination. She has been weight bearing in a supportive sneaker. Today, patient is maximally tender in the forefoot about each of her 4 web spaces about her left foot concerning for a neuropathic/rheumatologic diagnosis and not orthopedic. This is a pain she developed after her first visit. If her EMG does not show any sign of neuropathy then an MRI of both her left foot and ankle will be ordered  Return for review of EMG results. History of Present Illness:   Chief Complaint:   Chief Complaint   Patient presents with    Left Ankle - Follow-up     Aston Rodriguez is a 79 y.o. female who is being seen in follow-up for left foot pain. When we last saw she we recommended PT/HEP for her ankles. She is denying pain about her ankles today. Pain has improved about her ankle but she notes significant pain about the forefoot both plantar and dorsal aspect and described as sharp and severe.       Pain/symptom timing:  Worse during the day when active  Pain/symptom context:  Worse with activites and work  Pain/symptom modifying factors:  Rest makes better, activities make worse  Pain/symptom associated signs/symptoms: none    Prior treatment   NSAIDsNo Unable to take due to allergy  Injections No   Bracing/Orthotics Yes    Physical Therapy Yes     Orthopedic Surgical History:   See below    Past Medical, Surgical and Social History:  Past Medical History:  has a past medical history of Allergic, Asthma, Dahl esophagus, Dahl's esophagus, Basal cell carcinoma (2021), Closed left ankle fracture, Diverticulitis, Fibromyalgia, primary, GERD (gastroesophageal reflux disease), Hypertension, Otitis media, Scoliosis (), Urinary tract infection, and Visual impairment (May 2021). Problem List: does not have any pertinent problems on file. Past Surgical History:  has a past surgical history that includes Elbow surgery (Left); Skin biopsy; Mohs surgery (Left, 09/15/2021);  section (1986);  section (1989); Colonoscopy; EGD; pr laparoscopy surg cholecystectomy (N/A, 2021); Cholecystectomy (Dec. 2021); Upper gastrointestinal endoscopy; and Band hemorrhoidectomy. Family History: family history includes Asthma in her brother, daughter, maternal grandfather, and paternal grandmother; Dahl's esophagus in her brother, brother, and brother; Cancer (age of onset: 76) in her brother; Colon cancer (age of onset: 80) in her maternal grandfather; Diabetes (age of onset: 79) in her father and mother; Heart disease in her mother; Heart disease (age of onset: 61) in her father; Hyperlipidemia in her brother, brother, and sister; Hypertension in her brother, father, mother, and sister; Lung disease in her brother; Melanoma in her brother and sister; No Known Problems in her maternal aunt, maternal aunt, maternal aunt, maternal grandmother, paternal grandfather, and son; Prostate cancer in her father; Stroke in her father. Social History:  reports that she has never smoked. She has never used smokeless tobacco. She reports that she does not drink alcohol and does not use drugs.   Current Medications: has a current medication list which includes the following prescription(s): aspirin, vitamin d3, multiple vitamins-minerals, omeprazole, ramipril, atorvastatin, epinephrine, and lorazepam.  Allergies: is allergic to drug [diclofenac sodium], garlic - food allergy, latex, meperidine, nsaids, other, percocet [oxycodone-acetaminophen], formaldehyde, levaquin [levofloxacin], adhesive [medical tape], penicillins, and sulfa antibiotics. Review of Systems:  General- denies fever/chills  HEENT- denies hearing loss or sore throat  Eyes- denies eye pain or visual disturbances, denies red eyes  Respiratory- denies cough or SOB  Cardio- denies chest pain or palpitations  GI- denies abdominal pain  Endocrine- denies urinary frequency  Urinary- denies pain with urination  Musculoskeletal- Negative except noted above  Skin- denies rashes or wounds  Neurological- denies dizziness or headache  Psychiatric- denies anxiety or difficulty concentrating    Physical Exam:   /82 (BP Location: Left arm, Patient Position: Sitting, Cuff Size: Large)   Pulse 102   Ht 5' 8" (1.727 m)   Wt 90.7 kg (200 lb)   BMI 30.41 kg/m²   General/Constitutional: No apparent distress: well-nourished and well developed. Eyes: normal ocular motion  Lymphatic: No appreciable lymphadenopathy  Respiratory: Non-labored breathing  Vascular: No edema, swelling or tenderness, except as noted in detailed exam.  Integumentary: No impressive skin lesions present, except as noted in detailed exam.  Neuro: No ataxia or tremors noted  Psych: Normal mood and affect, oriented to person, place and time. Appropriate affect. Musculoskeletal: Normal, except as noted in detailed exam and in HPI.     Examination    Left    Gait Antalgic   Musculoskeletal Tender to palpation at 1-4th web spaces    Skin Normal.    Nails Normal    Range of Motion  20 degrees dorsiflexion, 30 degrees plantarflexion  Subtalar motion: normal    Stability Stable    Muscle Strength 5/5 tibialis anterior  5/5 gastrocnemius-soleus  5/5 posterior tibialis  5/5 peroneal/eversion strength  5/5 EHL  5/5 FHL    Neurologic Normal    Sensation Intact to light touch throughout sural, saphenous, superficial peroneal, deep peroneal and medial/lateral plantar nerve distributions. Friedens-Maricarmen 5.07 filament (10g) testing deferred. Cardiovascular Brisk capillary refill < 2 seconds,intact DP and PT pulses    Special Tests None      Imaging Studies:   3 views of the Left foot were obtained, reviewed and interpreted independently which demonstrate severe disuse osteopenia. No acute fracture/dislocation. No degenerative changes. Reviewed by me personally. Sulema Perone. [facility], MD  Foot & Ankle Surgery   Department of [address] [place] [address]      I personally performed the service. Sulema Perone. [facility], MD    Scribe Attestation      I,:  Tiffany Carpenter am acting as a scribe while in the presence of the attending physician.:       I,:  Reena Caballero MD personally performed the services described in this documentation    as scribed in my presence.:

## 2023-10-19 ENCOUNTER — APPOINTMENT (OUTPATIENT)
Dept: PHYSICAL THERAPY | Facility: CLINIC | Age: 68
End: 2023-10-19
Payer: MEDICARE

## 2023-10-23 ENCOUNTER — APPOINTMENT (OUTPATIENT)
Dept: PHYSICAL THERAPY | Facility: CLINIC | Age: 68
End: 2023-10-23
Payer: MEDICARE

## 2023-10-26 ENCOUNTER — APPOINTMENT (OUTPATIENT)
Dept: PHYSICAL THERAPY | Facility: CLINIC | Age: 68
End: 2023-10-26
Payer: MEDICARE

## 2023-10-30 ENCOUNTER — APPOINTMENT (OUTPATIENT)
Dept: PHYSICAL THERAPY | Facility: CLINIC | Age: 68
End: 2023-10-30
Payer: MEDICARE

## 2023-10-31 ENCOUNTER — OFFICE VISIT (OUTPATIENT)
Dept: INTERNAL MEDICINE CLINIC | Facility: CLINIC | Age: 68
End: 2023-10-31
Payer: MEDICARE

## 2023-10-31 VITALS
SYSTOLIC BLOOD PRESSURE: 140 MMHG | BODY MASS INDEX: 30.01 KG/M2 | OXYGEN SATURATION: 99 % | TEMPERATURE: 96.9 F | DIASTOLIC BLOOD PRESSURE: 92 MMHG | WEIGHT: 198 LBS | HEIGHT: 68 IN | HEART RATE: 102 BPM

## 2023-10-31 DIAGNOSIS — Z23 NEED FOR INFLUENZA VACCINATION: ICD-10-CM

## 2023-10-31 DIAGNOSIS — E78.2 MIXED HYPERLIPIDEMIA: ICD-10-CM

## 2023-10-31 DIAGNOSIS — Z23 ENCOUNTER FOR IMMUNIZATION: ICD-10-CM

## 2023-10-31 DIAGNOSIS — Z00.00 HEALTH MAINTENANCE EXAMINATION: ICD-10-CM

## 2023-10-31 DIAGNOSIS — I10 ESSENTIAL HYPERTENSION: ICD-10-CM

## 2023-10-31 DIAGNOSIS — K22.70 BARRETT'S ESOPHAGUS WITHOUT DYSPLASIA: ICD-10-CM

## 2023-10-31 DIAGNOSIS — M84.372S: Primary | ICD-10-CM

## 2023-10-31 DIAGNOSIS — F41.1 GENERALIZED ANXIETY DISORDER: ICD-10-CM

## 2023-10-31 DIAGNOSIS — R73.03 PREDIABETES: ICD-10-CM

## 2023-10-31 DIAGNOSIS — M81.0 AGE-RELATED OSTEOPOROSIS WITHOUT CURRENT PATHOLOGICAL FRACTURE: ICD-10-CM

## 2023-10-31 DIAGNOSIS — M76.61 ACHILLES TENDINITIS OF BOTH LOWER EXTREMITIES: ICD-10-CM

## 2023-10-31 DIAGNOSIS — M76.62 ACHILLES TENDINITIS OF BOTH LOWER EXTREMITIES: ICD-10-CM

## 2023-10-31 DIAGNOSIS — Z79.899 ENCOUNTER FOR LONG-TERM CURRENT USE OF MEDICATION: ICD-10-CM

## 2023-10-31 DIAGNOSIS — M79.7 FIBROMYALGIA: ICD-10-CM

## 2023-10-31 DIAGNOSIS — E66.09 CLASS 1 OBESITY DUE TO EXCESS CALORIES WITHOUT SERIOUS COMORBIDITY WITH BODY MASS INDEX (BMI) OF 32.0 TO 32.9 IN ADULT: ICD-10-CM

## 2023-10-31 DIAGNOSIS — N18.2 CHRONIC KIDNEY DISEASE (CKD), STAGE II (MILD): ICD-10-CM

## 2023-10-31 PROCEDURE — G0439 PPPS, SUBSEQ VISIT: HCPCS | Performed by: INTERNAL MEDICINE

## 2023-10-31 PROCEDURE — 90662 IIV NO PRSV INCREASED AG IM: CPT | Performed by: INTERNAL MEDICINE

## 2023-10-31 PROCEDURE — G0008 ADMIN INFLUENZA VIRUS VAC: HCPCS | Performed by: INTERNAL MEDICINE

## 2023-10-31 PROCEDURE — 99214 OFFICE O/P EST MOD 30 MIN: CPT | Performed by: INTERNAL MEDICINE

## 2023-10-31 RX ORDER — DIPHENOXYLATE HYDROCHLORIDE AND ATROPINE SULFATE 2.5; .025 MG/1; MG/1
1 TABLET ORAL DAILY
COMMUNITY

## 2023-10-31 NOTE — PROGRESS NOTES
Assessment and Plan:     Problem List Items Addressed This Visit        Digestive    Dahl's esophagus     On daily PPI. Repeat EGD 2025. Cardiovascular and Mediastinum    Essential hypertension     BP slightly elevated today. On ramipril 10 mg.            Musculoskeletal and Integument    Achilles tendinitis of both lower extremities     Intermittent pain. Age-related osteoporosis without current pathological fracture     Unable to walk due to pain, taking D3 only. Relevant Orders    Vitamin D 25 hydroxy    Stress fracture, left ankle, sequela - Primary     Was given the boot, ongoing PT, saw Ortho. EMG scheduled. Ongoing pain. Genitourinary    Chronic kidney disease (CKD), stage II (mild)     Lab Results   Component Value Date    EGFR 62 09/08/2023    EGFR 61 03/09/2023    EGFR 63 06/21/2019    CREATININE 0.95 09/08/2023    CREATININE 0.96 03/09/2023    CREATININE 0.97 06/21/2019     Stable. Avoid NSAIDs. Other    Class 1 obesity due to excess calories without serious comorbidity with body mass index (BMI) of 32.0 to 32.9 in adult     Weight loss of 5 lbs, has not been walking. Fibromyalgia     Not interested in medication, declined aqua therapy. Keep appointment with rheumatology. Generalized anxiety disorder     No change. Declined daily medication. Rare use of lorazepam.         Hyperlipidemia     Lipids improved but still not at goal.  Encouraged to take atorvastatin. Relevant Orders    Comprehensive metabolic panel    Lipid panel    Prediabetes     A1c worse at 6.3%. Reviewed diet, portion control.          Relevant Orders    CBC and differential    Hemoglobin A1C   Other Visit Diagnoses     Encounter for immunization        Relevant Orders    influenza vaccine, high-dose, PF 0.7 mL (FLUZONE HIGH-DOSE) (Completed)    Need for influenza vaccination        Encounter for long-term current use of medication        Relevant Orders Vitamin B12    Health maintenance examination        Flu vaccine today, declined Prevnar and other vaccinations. BMI Counseling: Body mass index is 30.11 kg/m². The BMI is above normal. Nutrition recommendations include encouraging healthy choices of fruits and vegetables. Exercise recommendations include strength training exercises. Rationale for BMI follow-up plan is due to patient being overweight or obese. Depression Screening and Follow-up Plan: Patient was screened for depression during today's encounter. They screened negative with a PHQ-2 score of 0. Preventive health issues were discussed with patient, and age appropriate screening tests were ordered as noted in patient's After Visit Summary. Personalized health advice and appropriate referrals for health education or preventive services given if needed, as noted in patient's After Visit Summary. History of Present Illness:     Patient presents for a Medicare Wellness Visit and follow up visit. She complains of ongiong left ankle pain. She is wearing compressiong stockings  She complains of constant pain with ambulation. She reports swelling has not gotten better, it is always swollen. She is very frustrated about this because she cannot walk and has pain all the time. She does not take or apply any medication on her ankle. She started taking vitamin K2, was told it helps with calcium absorption and her bones. She complains of feeling sore everywhere all the time. She had seen rheumatology several years ago and was diagnosed with fibromyalgia. She does not like going to the pool, not interested in aqua therapy. She is not interested in medication either. She moves her bowels 3 to 4 times a day. No incontinence. She has been taking Metamucil gummies, 2 a day. She reports her bowel movements varies. She has been busy and stressed the past few months. She sold her mother's home, her son does not have melanoma. Patient Care Team:  Liane Chandra MD as PCP - General (Internal Medicine)  Camron Edwards MD     Review of Systems:     Review of Systems   Constitutional:  Negative for appetite change and fatigue. HENT:  Negative for congestion, ear pain and postnasal drip. Eyes:  Negative for visual disturbance. Respiratory:  Negative for cough and shortness of breath. Cardiovascular:  Negative for chest pain and leg swelling. Gastrointestinal:  Negative for abdominal pain, constipation and diarrhea. Genitourinary:  Negative for dysuria, frequency and urgency. Musculoskeletal:  Positive for arthralgias and gait problem. Negative for myalgias. Skin:  Negative for rash and wound. Neurological:  Negative for dizziness, numbness and headaches. Hematological:  Does not bruise/bleed easily. Psychiatric/Behavioral:  Negative for confusion. The patient is not nervous/anxious.          Problem List:     Patient Active Problem List   Diagnosis   • Asthma   • Essential hypertension   • Fibromyalgia   • Dahl's esophagus   • Prediabetes   • Class 1 obesity due to excess calories without serious comorbidity with body mass index (BMI) of 32.0 to 32.9 in adult   • Plantar fasciitis, bilateral   • Achilles tendinitis of both lower extremities   • Allergy   • Family history of melanoma   • Raynaud phenomenon   • Stress   • Elevated hemoglobin (HCC)   • Hx of adenomatous colonic polyps   • Diverticulosis   • Hyperlipidemia   • Age-related osteoporosis without current pathological fracture   • Chronic kidney disease (CKD), stage II (mild)   • Generalized anxiety disorder   • Stress fracture, left ankle, sequela      Past Medical and Surgical History:     Past Medical History:   Diagnosis Date   • Allergic    • Asthma     Cough/Allergy Induced   • Dahl esophagus    • Dahl's esophagus    • Basal cell carcinoma 07/08/2021    Left nasal ala crease   • Closed left ankle fracture     chip   • Diverticulitis    • Fibromyalgia, primary    • GERD (gastroesophageal reflux disease)    • Hypertension    • Otitis media    • Scoliosis    • Urinary tract infection    • Visual impairment May 2021    Retina specialist seen     Past Surgical History:   Procedure Laterality Date   • BAND HEMORRHOIDECTOMY     •  SECTION  1986   •  SECTION  1989   • CHOLECYSTECTOMY  Dec. 2021   • COLONOSCOPY     • EGD     • ELBOW SURGERY Left     cyst removal   • MOHS SURGERY Left 09/15/2021    RADHA-   • SC LAPAROSCOPY SURG CHOLECYSTECTOMY N/A 2021    Procedure: LAPAROSCOPIC CHOLECYSTECTOMY;  Surgeon: Ronak Guerra MD;  Location: [facility];  Service: General   • SKIN BIOPSY     • UPPER GASTROINTESTINAL ENDOSCOPY        Family History:     Family History   Problem Relation Age of Onset   • Diabetes Mother 79   • Hypertension Mother    • Heart disease Mother    • Hyperlipidemia Mother    • Prostate cancer Father    • Diabetes Father 79   • Hypertension Father    • Heart disease Father 61   • Stroke Father    • Hyperlipidemia Father    • Melanoma Sister    • Hypertension Sister    • Hyperlipidemia Sister    • Asthma Daughter    • Heart disease Maternal Grandmother    • Colon cancer Maternal Grandfather 80   • Asthma Maternal Grandfather    • Asthma Paternal Grandmother    • No Known Problems Paternal Grandfather    • Hyperlipidemia Brother    • Melanoma Brother    • Asthma Brother    • Lung disease Brother    • Dahl's esophagus Brother    • Hypertension Brother    • Cancer Brother 76        Oral pharyngial   • Dahl's esophagus Brother    • Heart disease Brother         A-Fib   • Hyperlipidemia Brother    • Dahl's esophagus Brother    • Hypertension Brother    • Heart disease Brother         Heart attack in 45s. 1 stent   • No Known Problems Son    • No Known Problems Maternal Aunt    • No Known Problems Maternal Aunt    • No Known Problems Maternal Aunt    • Alcohol abuse Neg Hx    • Substance Abuse Neg Hx    • Mental illness Neg Hx    • Depression Neg Hx       Social History:     Social History     Socioeconomic History   • Marital status: /Civil Union     Spouse name: None   • Number of children: None   • Years of education: None   • Highest education level: None   Occupational History   • None   Tobacco Use   • Smoking status: Never   • Smokeless tobacco: Never   Vaping Use   • Vaping Use: Never used   Substance and Sexual Activity   • Alcohol use: Never     Comment: Rare Socially   • Drug use: Never   • Sexual activity: Not Currently     Partners: Male     Birth control/protection: None     Comment: declines STD testing    Other Topics Concern   • None   Social History Narrative        Lives at home with  and son    Has a daughter in [address] coffee 1 cup/daily     Social Determinants of Health     Financial Resource Strain: Unknown (10/27/2023)    Overall Financial Resource Strain (CARDIA)    • Difficulty of Paying Living Expenses: Patient refused   Food Insecurity: Not on file   Transportation Needs: Unknown (10/27/2023)    PRAPARE - Transportation    • Lack of Transportation (Medical): Patient refused    • Lack of Transportation (Non-Medical): Patient refused   Physical Activity: Not on file   Stress: Not on file   Social Connections: Not on file   Intimate Partner Violence: Not on file   Housing Stability: Not on file      Medications and Allergies:     Current Outpatient Medications   Medication Sig Dispense Refill   • aspirin 81 mg chewable tablet Chew     • atorvastatin (LIPITOR) 20 mg tablet Take 1 tablet (20 mg total) by mouth daily (Patient not taking: Reported on 9/6/2023) 30 tablet 0   • Cholecalciferol (Vitamin D3) 50 MCG (2000 UT) TABS Take by mouth       • EPINEPHrine (EPIPEN) 0.3 mg/0.3 mL SOAJ Inject 0.3 mL (0.3 mg total) into a muscle once for 1 dose 0.6 mL 0   • LORazepam (ATIVAN) 0.5 mg tablet Take 1/2 to 1 tab PO daily prn for anxiety (Patient not taking: Reported on 9/6/2023) 30 tablet 0   • Multiple Vitamins-Minerals (CENTRUM ADULTS PO) Take by mouth     • multivitamin (THERAGRAN) TABS Take 1 tablet by mouth daily     • omeprazole (PriLOSEC) 20 mg delayed release capsule Take 20 mg by mouth daily in the early morning       • ramipril (ALTACE) 10 MG capsule Take 1 capsule by mouth once daily 90 capsule 0     No current facility-administered medications for this visit. Allergies   Allergen Reactions   • Diclofenac Sodium Swelling   • Drug [Diclofenac Sodium] Tongue Swelling   • Garlic - Food Allergy Throat Swelling   • Latex Anaphylaxis     Latex and Black Rubber   • Meperidine Tongue Swelling   • Nsaids GI Bleeding     Rectal bleeding   • Other Throat Swelling     "Perfumes"   • Percocet [Oxycodone-Acetaminophen] Itching     Norco ok    • Formaldehyde Other (See Comments)     + Skin Test   • Levaquin [Levofloxacin] Other (See Comments)     Achilles tendon pain   • Adhesive [Medical Tape] Rash   • Penicillins Rash   • Sulfa Antibiotics Rash      Immunizations:     Immunization History   Administered Date(s) Administered   • COVID-19, unspecified 04/19/2021, 05/10/2021   • INFLUENZA 11/05/2017, 11/04/2018, 10/20/2021, 10/10/2022   • Influenza, high dose seasonal 0.7 mL 10/10/2022, 10/31/2023   • Influenza, injectable, quadrivalent, preservative free 0.5 mL 10/19/2020      Health Maintenance:         Topic Date Due   • Breast Cancer Screening: Mammogram  10/02/2024   • Colorectal Cancer Screening  09/22/2025   • Hepatitis C Screening  Completed         Topic Date Due   • Pneumococcal Vaccine: 65+ Years (1 - PCV) Never done   • COVID-19 Vaccine (3 - Mixed Product series) 07/05/2021      Medicare Screening Tests and Risk Assessments: Stu Fofana is here for her Subsequent Wellness visit. Last Medicare Wellness visit information reviewed, patient interviewed and updates made to the record today. Health Risk Assessment:   Patient rates overall health as fair.  Patient feels that their physical health rating is slightly worse. Patient is very satisfied with their life. Eyesight was rated as same. Hearing was rated as same. Patient feels that their emotional and mental health rating is slightly better. Patients states they are never, rarely angry. Patient states they are sometimes unusually tired/fatigued. Pain experienced in the last 7 days has been a lot. Patient's pain rating has been 7/10. Patient states that she has experienced no weight loss or gain in last 6 months. Depression Screening:   PHQ-2 Score: 0      Fall Risk Screening: In the past year, patient has experienced: no history of falling in past year      Urinary Incontinence Screening:   Patient has leaked urine accidently in the last six months. Home Safety:  Patient has trouble with stairs inside or outside of their home. Patient has working smoke alarms and has working carbon monoxide detector. Home safety hazards include: none. Nutrition:   Current diet is Regular. Medications:   Patient is currently taking over-the-counter supplements. OTC medications include: see medication list. Patient is able to manage medications. Activities of Daily Living (ADLs)/Instrumental Activities of Daily Living (IADLs):   Walk and transfer into and out of bed and chair?: Yes  Dress and groom yourself?: Yes    Bathe or shower yourself?: Yes    Feed yourself? Yes  Do your laundry/housekeeping?: Yes  Manage your money, pay your bills and track your expenses?: Yes  Make your own meals?: Yes    Do your own shopping?: Yes    Previous Hospitalizations:   Any hospitalizations or ED visits within the last 12 months?: No      Advance Care Planning:   Living will: Yes    Durable POA for healthcare:  Yes    Advanced directive: Yes    End of Life Decisions reviewed with patient: Yes      Cognitive Screening:   Provider or family/friend/caregiver concerned regarding cognition?: No    PREVENTIVE SCREENINGS      Cardiovascular Screening:    General: History Lipid Disorder and Screening Current      Diabetes Screening:     General: Screening Current      Colorectal Cancer Screening:     General: Screening Current      Breast Cancer Screening:     General: Screening Current      Cervical Cancer Screening:    General: Screening Not Indicated      Osteoporosis Screening:    General: History Osteoporosis and Screening Current      Abdominal Aortic Aneurysm (AAA) Screening:        General: Screening Not Indicated      Lung Cancer Screening:     General: Screening Not Indicated      Hepatitis C Screening:    General: Screening Current    Screening, Brief Intervention, and Referral to Treatment (SBIRT)    Screening  Typical number of drinks in a day: 0  Typical number of drinks in a week: 0  Interpretation: Low risk drinking behavior. AUDIT-C Screenin) How often did you have a drink containing alcohol in the past year? never  2) How many drinks did you have on a typical day when you were drinking in the past year? 0  3) How often did you have 6 or more drinks on one occasion in the past year? never    AUDIT-C Score: 0  Interpretation: Score 0-2 (female): Negative screen for alcohol misuse    Single Item Drug Screening:  How often have you used an illegal drug (including marijuana) or a prescription medication for non-medical reasons in the past year? never    Single Item Drug Screen Score: 0  Interpretation: Negative screen for possible drug use disorder    Other Counseling Topics:   Skin self-exam and regular weightbearing exercise and calcium and vitamin D intake. No results found. Physical Exam:     /92   Pulse 102   Temp (!) 96.9 °F (36.1 °C)   Ht 5' 8" (1.727 m)   Wt 89.8 kg (198 lb)   SpO2 99%   BMI 30.11 kg/m²     Physical Exam  Vitals and nursing note reviewed. Constitutional:       General: She is not in acute distress. Appearance: She is well-developed. HENT:      Head: Normocephalic and atraumatic. Mouth/Throat:      Mouth: Mucous membranes are moist.   Eyes:      Conjunctiva/sclera: Conjunctivae normal.   Cardiovascular:      Rate and Rhythm: Normal rate and regular rhythm. Heart sounds: No murmur heard. Pulmonary:      Effort: Pulmonary effort is normal. No respiratory distress. Breath sounds: Normal breath sounds. Abdominal:      Palpations: Abdomen is soft. Tenderness: There is no abdominal tenderness. Musculoskeletal:         General: No swelling. Cervical back: Neck supple. Skin:     General: Skin is warm and dry. Neurological:      Mental Status: She is alert. Psychiatric:         Mood and Affect: Mood normal.          John Garcia MD    Labs & imaging results reviewed with patient.

## 2023-10-31 NOTE — ASSESSMENT & PLAN NOTE
Lab Results   Component Value Date    EGFR 62 09/08/2023    EGFR 61 03/09/2023    EGFR 63 06/21/2019    CREATININE 0.95 09/08/2023    CREATININE 0.96 03/09/2023    CREATININE 0.97 06/21/2019     Stable. Avoid NSAIDs.

## 2023-11-14 ENCOUNTER — HOSPITAL ENCOUNTER (OUTPATIENT)
Dept: NEUROLOGY | Facility: CLINIC | Age: 68
Discharge: HOME | End: 2023-11-14
Payer: MEDICARE

## 2023-11-14 DIAGNOSIS — G57.30 PERONEAL NEUROPATHY, UNSPECIFIED LATERALITY: ICD-10-CM

## 2023-11-14 PROCEDURE — 95911 NRV CNDJ TEST 9-10 STUDIES: CPT | Performed by: PSYCHIATRY & NEUROLOGY

## 2023-11-14 PROCEDURE — 95886 MUSC TEST DONE W/N TEST COMP: CPT | Performed by: PSYCHIATRY & NEUROLOGY

## 2023-12-10 DIAGNOSIS — I10 ESSENTIAL HYPERTENSION: ICD-10-CM

## 2023-12-11 RX ORDER — RAMIPRIL 10 MG/1
CAPSULE ORAL
Qty: 90 CAPSULE | Refills: 0 | Status: SHIPPED | OUTPATIENT
Start: 2023-12-11

## 2024-02-21 ENCOUNTER — TRANSCRIBE ORDERS (OUTPATIENT)
Dept: LAB | Facility: CLINIC | Age: 69
End: 2024-02-21

## 2024-02-21 ENCOUNTER — APPOINTMENT (OUTPATIENT)
Dept: LAB | Facility: CLINIC | Age: 69
End: 2024-02-21
Payer: MEDICARE

## 2024-02-21 DIAGNOSIS — M81.0 AGE-RELATED OSTEOPOROSIS WITHOUT CURRENT PATHOLOGICAL FRACTURE: ICD-10-CM

## 2024-02-21 DIAGNOSIS — M25.50 POLYARTHRALGIA: ICD-10-CM

## 2024-02-21 DIAGNOSIS — M25.50 POLYARTHRALGIA: Primary | ICD-10-CM

## 2024-02-21 LAB
ANA SER QL IA: NEGATIVE
ERYTHROCYTE [SEDIMENTATION RATE] IN BLOOD: 28 MM/HOUR (ref 0–29)
PTH-INTACT SERPL-MCNC: 33.5 PG/ML (ref 12–88)

## 2024-02-21 PROCEDURE — 36415 COLL VENOUS BLD VENIPUNCTURE: CPT

## 2024-02-21 PROCEDURE — 80053 COMPREHEN METABOLIC PANEL: CPT

## 2024-02-21 PROCEDURE — 86038 ANTINUCLEAR ANTIBODIES: CPT

## 2024-02-21 PROCEDURE — 86200 CCP ANTIBODY: CPT

## 2024-02-21 PROCEDURE — 83970 ASSAY OF PARATHORMONE: CPT

## 2024-02-21 PROCEDURE — 86430 RHEUMATOID FACTOR TEST QUAL: CPT

## 2024-02-21 PROCEDURE — 85652 RBC SED RATE AUTOMATED: CPT

## 2024-02-21 PROCEDURE — 86140 C-REACTIVE PROTEIN: CPT

## 2024-02-22 LAB
ALBUMIN SERPL BCP-MCNC: 4.8 G/DL (ref 3.5–5)
ALP SERPL-CCNC: 57 U/L (ref 34–104)
ALT SERPL W P-5'-P-CCNC: 31 U/L (ref 7–52)
ANION GAP SERPL CALCULATED.3IONS-SCNC: 13 MMOL/L
AST SERPL W P-5'-P-CCNC: 33 U/L (ref 13–39)
BILIRUB SERPL-MCNC: 0.68 MG/DL (ref 0.2–1)
BUN SERPL-MCNC: 17 MG/DL (ref 5–25)
CALCIUM SERPL-MCNC: 9.8 MG/DL (ref 8.4–10.2)
CHLORIDE SERPL-SCNC: 100 MMOL/L (ref 96–108)
CO2 SERPL-SCNC: 25 MMOL/L (ref 21–32)
CREAT SERPL-MCNC: 0.76 MG/DL (ref 0.6–1.3)
CRP SERPL QL: 2 MG/L
GFR SERPL CREATININE-BSD FRML MDRD: 81 ML/MIN/1.73SQ M
GLUCOSE P FAST SERPL-MCNC: 110 MG/DL (ref 65–99)
POTASSIUM SERPL-SCNC: 4.8 MMOL/L (ref 3.5–5.3)
PROT SERPL-MCNC: 7.4 G/DL (ref 6.4–8.4)
RHEUMATOID FACT SER QL LA: NEGATIVE
SODIUM SERPL-SCNC: 138 MMOL/L (ref 135–147)

## 2024-02-23 LAB — CCP AB SER IA-ACNC: 0.5

## 2024-03-06 DIAGNOSIS — I10 ESSENTIAL HYPERTENSION: ICD-10-CM

## 2024-03-06 RX ORDER — RAMIPRIL 10 MG/1
CAPSULE ORAL
Qty: 90 CAPSULE | Refills: 1 | Status: SHIPPED | OUTPATIENT
Start: 2024-03-06

## 2024-04-26 ENCOUNTER — APPOINTMENT (OUTPATIENT)
Dept: LAB | Facility: CLINIC | Age: 69
End: 2024-04-26
Payer: MEDICARE

## 2024-04-26 DIAGNOSIS — R73.03 PREDIABETES: ICD-10-CM

## 2024-04-26 DIAGNOSIS — M81.0 AGE-RELATED OSTEOPOROSIS WITHOUT CURRENT PATHOLOGICAL FRACTURE: ICD-10-CM

## 2024-04-26 DIAGNOSIS — E78.2 MIXED HYPERLIPIDEMIA: ICD-10-CM

## 2024-04-26 DIAGNOSIS — Z79.899 ENCOUNTER FOR LONG-TERM CURRENT USE OF MEDICATION: ICD-10-CM

## 2024-04-26 LAB
25(OH)D3 SERPL-MCNC: 73.5 NG/ML (ref 30–100)
ALBUMIN SERPL BCP-MCNC: 4.6 G/DL (ref 3.5–5)
ALP SERPL-CCNC: 57 U/L (ref 34–104)
ALT SERPL W P-5'-P-CCNC: 27 U/L (ref 7–52)
ANION GAP SERPL CALCULATED.3IONS-SCNC: 11 MMOL/L (ref 4–13)
AST SERPL W P-5'-P-CCNC: 25 U/L (ref 13–39)
BASOPHILS # BLD AUTO: 0.06 THOUSANDS/ÂΜL (ref 0–0.1)
BASOPHILS NFR BLD AUTO: 1 % (ref 0–1)
BILIRUB SERPL-MCNC: 0.61 MG/DL (ref 0.2–1)
BUN SERPL-MCNC: 20 MG/DL (ref 5–25)
CALCIUM SERPL-MCNC: 9.7 MG/DL (ref 8.4–10.2)
CHLORIDE SERPL-SCNC: 99 MMOL/L (ref 96–108)
CHOLEST SERPL-MCNC: 267 MG/DL
CO2 SERPL-SCNC: 28 MMOL/L (ref 21–32)
CREAT SERPL-MCNC: 0.8 MG/DL (ref 0.6–1.3)
EOSINOPHIL # BLD AUTO: 0.11 THOUSAND/ÂΜL (ref 0–0.61)
EOSINOPHIL NFR BLD AUTO: 2 % (ref 0–6)
ERYTHROCYTE [DISTWIDTH] IN BLOOD BY AUTOMATED COUNT: 12.7 % (ref 11.6–15.1)
EST. AVERAGE GLUCOSE BLD GHB EST-MCNC: 128 MG/DL
GFR SERPL CREATININE-BSD FRML MDRD: 76 ML/MIN/1.73SQ M
GLUCOSE P FAST SERPL-MCNC: 108 MG/DL (ref 65–99)
HBA1C MFR BLD: 6.1 %
HCT VFR BLD AUTO: 46.9 % (ref 34.8–46.1)
HDLC SERPL-MCNC: 56 MG/DL
HGB BLD-MCNC: 15.4 G/DL (ref 11.5–15.4)
IMM GRANULOCYTES # BLD AUTO: 0.01 THOUSAND/UL (ref 0–0.2)
IMM GRANULOCYTES NFR BLD AUTO: 0 % (ref 0–2)
LDLC SERPL CALC-MCNC: 147 MG/DL (ref 0–100)
LYMPHOCYTES # BLD AUTO: 2.35 THOUSANDS/ÂΜL (ref 0.6–4.47)
LYMPHOCYTES NFR BLD AUTO: 33 % (ref 14–44)
MCH RBC QN AUTO: 30.4 PG (ref 26.8–34.3)
MCHC RBC AUTO-ENTMCNC: 32.8 G/DL (ref 31.4–37.4)
MCV RBC AUTO: 93 FL (ref 82–98)
MONOCYTES # BLD AUTO: 0.71 THOUSAND/ÂΜL (ref 0.17–1.22)
MONOCYTES NFR BLD AUTO: 10 % (ref 4–12)
NEUTROPHILS # BLD AUTO: 3.93 THOUSANDS/ÂΜL (ref 1.85–7.62)
NEUTS SEG NFR BLD AUTO: 54 % (ref 43–75)
NONHDLC SERPL-MCNC: 211 MG/DL
NRBC BLD AUTO-RTO: 0 /100 WBCS
PLATELET # BLD AUTO: 366 THOUSANDS/UL (ref 149–390)
PMV BLD AUTO: 10.1 FL (ref 8.9–12.7)
POTASSIUM SERPL-SCNC: 3.9 MMOL/L (ref 3.5–5.3)
PROT SERPL-MCNC: 7.3 G/DL (ref 6.4–8.4)
RBC # BLD AUTO: 5.07 MILLION/UL (ref 3.81–5.12)
SODIUM SERPL-SCNC: 138 MMOL/L (ref 135–147)
TRIGL SERPL-MCNC: 322 MG/DL
VIT B12 SERPL-MCNC: 428 PG/ML (ref 180–914)
WBC # BLD AUTO: 7.17 THOUSAND/UL (ref 4.31–10.16)

## 2024-04-26 PROCEDURE — 85025 COMPLETE CBC W/AUTO DIFF WBC: CPT

## 2024-04-26 PROCEDURE — 36415 COLL VENOUS BLD VENIPUNCTURE: CPT

## 2024-04-26 PROCEDURE — 82607 VITAMIN B-12: CPT

## 2024-04-26 PROCEDURE — 83036 HEMOGLOBIN GLYCOSYLATED A1C: CPT

## 2024-04-26 PROCEDURE — 80061 LIPID PANEL: CPT

## 2024-04-26 PROCEDURE — 82306 VITAMIN D 25 HYDROXY: CPT

## 2024-04-26 PROCEDURE — 80053 COMPREHEN METABOLIC PANEL: CPT

## 2024-05-02 ENCOUNTER — OFFICE VISIT (OUTPATIENT)
Dept: INTERNAL MEDICINE CLINIC | Facility: CLINIC | Age: 69
End: 2024-05-02
Payer: MEDICARE

## 2024-05-02 VITALS
DIASTOLIC BLOOD PRESSURE: 90 MMHG | HEIGHT: 68 IN | BODY MASS INDEX: 31.42 KG/M2 | HEART RATE: 93 BPM | TEMPERATURE: 96.7 F | WEIGHT: 207.3 LBS | OXYGEN SATURATION: 97 % | SYSTOLIC BLOOD PRESSURE: 150 MMHG

## 2024-05-02 DIAGNOSIS — K22.70 BARRETT'S ESOPHAGUS WITHOUT DYSPLASIA: ICD-10-CM

## 2024-05-02 DIAGNOSIS — M81.0 AGE-RELATED OSTEOPOROSIS WITHOUT CURRENT PATHOLOGICAL FRACTURE: ICD-10-CM

## 2024-05-02 DIAGNOSIS — M79.7 FIBROMYALGIA: ICD-10-CM

## 2024-05-02 DIAGNOSIS — I10 ESSENTIAL HYPERTENSION: Primary | ICD-10-CM

## 2024-05-02 DIAGNOSIS — M76.61 ACHILLES TENDINITIS OF BOTH LOWER EXTREMITIES: ICD-10-CM

## 2024-05-02 DIAGNOSIS — F41.1 GENERALIZED ANXIETY DISORDER: ICD-10-CM

## 2024-05-02 DIAGNOSIS — M76.62 ACHILLES TENDINITIS OF BOTH LOWER EXTREMITIES: ICD-10-CM

## 2024-05-02 DIAGNOSIS — E78.2 MIXED HYPERLIPIDEMIA: ICD-10-CM

## 2024-05-02 DIAGNOSIS — M54.16 LUMBAR RADICULOPATHY: ICD-10-CM

## 2024-05-02 DIAGNOSIS — E66.09 CLASS 1 OBESITY DUE TO EXCESS CALORIES WITHOUT SERIOUS COMORBIDITY WITH BODY MASS INDEX (BMI) OF 32.0 TO 32.9 IN ADULT: ICD-10-CM

## 2024-05-02 DIAGNOSIS — M84.372S: ICD-10-CM

## 2024-05-02 DIAGNOSIS — R73.03 PREDIABETES: ICD-10-CM

## 2024-05-02 DIAGNOSIS — N18.2 CHRONIC KIDNEY DISEASE (CKD), STAGE II (MILD): ICD-10-CM

## 2024-05-02 PROCEDURE — G2211 COMPLEX E/M VISIT ADD ON: HCPCS | Performed by: INTERNAL MEDICINE

## 2024-05-02 PROCEDURE — 99214 OFFICE O/P EST MOD 30 MIN: CPT | Performed by: INTERNAL MEDICINE

## 2024-05-02 RX ORDER — LISINOPRIL AND HYDROCHLOROTHIAZIDE 25; 20 MG/1; MG/1
1 TABLET ORAL DAILY
Qty: 90 TABLET | Refills: 0 | Status: SHIPPED | OUTPATIENT
Start: 2024-05-02

## 2024-05-02 RX ORDER — ASCORBIC ACID 125 MG
100 TABLET,CHEWABLE ORAL DAILY
COMMUNITY

## 2024-05-02 NOTE — ASSESSMENT & PLAN NOTE
Lab Results   Component Value Date    EGFR 76 04/26/2024    EGFR 81 02/21/2024    EGFR 62 09/08/2023    CREATININE 0.80 04/26/2024    CREATININE 0.76 02/21/2024    CREATININE 0.95 09/08/2023     Stable.

## 2024-05-02 NOTE — ASSESSMENT & PLAN NOTE
Weight gain 9 lbs since last visit.  Reviewed diet.  Discussed low impact exercises since unable to walk.

## 2024-05-02 NOTE — ASSESSMENT & PLAN NOTE
Reviewed MRI.  Went to physical therapy. Continue home exercises.  She was offered epidural injection.  Not on medication.

## 2024-05-02 NOTE — PROGRESS NOTES
Assessment/Plan:    Essential hypertension  BP remains elevated, on ramipril 10 mg.  She reports side effects with Norvasc and beta blocker.  Change ramipril to lisinopril-HCTZ. Monitor BP at home.    Hyperlipidemia  Lipids worse.  Did not take atorvastatin.  Discussed risk.    Prediabetes  A1c improved to 6.1%.    Chronic kidney disease (CKD), stage II (mild)  Lab Results   Component Value Date    EGFR 76 04/26/2024    EGFR 81 02/21/2024    EGFR 62 09/08/2023    CREATININE 0.80 04/26/2024    CREATININE 0.76 02/21/2024    CREATININE 0.95 09/08/2023     Stable.    Dahl's esophagus  On daily PPI.  Repeat EGD 6/25.    Age-related osteoporosis without current pathological fracture  Continue daily walks and supplements.  Discussed treatment options with rheum, declines.    Class 1 obesity due to excess calories without serious comorbidity with body mass index (BMI) of 32.0 to 32.9 in adult  Weight gain 9 lbs since last visit.  Reviewed diet.  Discussed low impact exercises since unable to walk.    Fibromyalgia  No change.  Saw rheum. Negative work up.    Generalized anxiety disorder  No change.  Reports ongoing stress.    Achilles tendinitis of both lower extremities  Ongoing intermittent pain.  Went to Ortho, physical therapy.    Lumbar radiculopathy  Reviewed MRI.  Went to physical therapy. Continue home exercises.  She was offered epidural injection.  Not on medication.    Stress fracture, left ankle, sequela  Ongoing issues, finished PT.  EMG was normal.     Diagnoses and all orders for this visit:    Essential hypertension  -     lisinopril-hydrochlorothiazide (PRINZIDE,ZESTORETIC) 20-25 MG per tablet; Take 1 tablet by mouth daily  -     Basic metabolic panel; Future    Mixed hyperlipidemia    Prediabetes    Chronic kidney disease (CKD), stage II (mild)    Dahl's esophagus without dysplasia    Age-related osteoporosis without current pathological fracture    Class 1 obesity due to excess calories without  "serious comorbidity with body mass index (BMI) of 32.0 to 32.9 in adult    Fibromyalgia    Generalized anxiety disorder    Achilles tendinitis of both lower extremities    Lumbar radiculopathy    Stress fracture, left ankle, sequela    Other orders  -     Menaquinone-7 (Vitamin K2) 100 MCG CAPS; Take 100 mcg by mouth daily      Follow up in 6 months or as needed.      Subjective:      Patient ID: Dora Warner is a 69 y.o. female here for a follow up.    He is very frustrated because of her ongoing left ankle and foot symptoms.  She has seen multiple specialists for this.  She was referred to rheumatology, another foot doctor and to orthopedics.  She had a back MRI and an EMG.  She reports that she feels she is not walking properly, does not have pain but has discomfort.  Was offered an epidural injection for her back which may help with her symptoms but she refused.  Is not taking any medication for symptoms.  She is unable to walk too far, considering a stationary bicycle.    She feels stressed all the time. She \"does not have time\" to check her blood pressure at home. She recalls being on the same dose of ramipril for the past 30 years. She recalls trying Norvasc and a beta blocker but developed side effects.  She did not start the cholesterol medication.      The following portions of the patient's history were reviewed and updated as appropriate: allergies, current medications, past medical history, past social history, and problem list.    Review of Systems   Constitutional:  Negative for appetite change and fatigue.   HENT:  Negative for congestion, ear pain and postnasal drip.    Eyes:  Negative for visual disturbance.   Respiratory:  Negative for cough and shortness of breath.    Cardiovascular:  Negative for chest pain and leg swelling.   Gastrointestinal:  Negative for abdominal pain, constipation and diarrhea.   Genitourinary:  Negative for dysuria, frequency and urgency.   Musculoskeletal:  Positive " "for arthralgias. Negative for myalgias.   Skin:  Negative for rash and wound.   Neurological:  Negative for dizziness, numbness and headaches.   Hematological:  Does not bruise/bleed easily.   Psychiatric/Behavioral:  Negative for confusion. The patient is not nervous/anxious.          Objective:      /90   Pulse 93   Temp (!) 96.7 °F (35.9 °C)   Ht 5' 8\" (1.727 m)   Wt 94 kg (207 lb 4.8 oz)   SpO2 97%   BMI 31.52 kg/m²          Physical Exam  Vitals and nursing note reviewed.   Constitutional:       General: She is not in acute distress.     Appearance: She is well-developed.   HENT:      Head: Normocephalic and atraumatic.      Mouth/Throat:      Mouth: Mucous membranes are moist.   Eyes:      Pupils: Pupils are equal, round, and reactive to light.   Cardiovascular:      Rate and Rhythm: Normal rate and regular rhythm.      Heart sounds: Normal heart sounds.   Pulmonary:      Effort: Pulmonary effort is normal.      Breath sounds: Normal breath sounds. No wheezing.   Abdominal:      General: Bowel sounds are normal.      Palpations: Abdomen is soft.   Musculoskeletal:         General: No swelling.      Right lower leg: No edema.      Left lower leg: No edema.   Skin:     General: Skin is warm.      Findings: No rash.   Neurological:      General: No focal deficit present.      Mental Status: She is alert and oriented to person, place, and time.   Psychiatric:         Mood and Affect: Mood and affect normal. Mood is not anxious or depressed.         Behavior: Behavior normal.           Labs & imaging results reviewed with patient.    "

## 2024-05-02 NOTE — ASSESSMENT & PLAN NOTE
BP remains elevated, on ramipril 10 mg.  She reports side effects with Norvasc and beta blocker.  Change ramipril to lisinopril-HCTZ. Monitor BP at home.

## 2024-05-16 ENCOUNTER — TELEPHONE (OUTPATIENT)
Dept: INTERNAL MEDICINE CLINIC | Facility: CLINIC | Age: 69
End: 2024-05-16

## 2024-05-16 NOTE — TELEPHONE ENCOUNTER
Please call patient.  Ask how BP readings are since we changed medication.    Remind to do blood work.

## 2024-05-16 NOTE — TELEPHONE ENCOUNTER
Spoke with patient   She states HR has been all over  05/04 Morning 128/82 HR 83  Evening 138/82 HR 82  05/05 Morning 145/86 HR 96   Evening 132/76 HR 74  05/06 Morning 130/97 HR 99  - Patient stated she started the Lisinopril- HCTZ on 05/04, states she takes the medication in the AM and checks BP about an hour after ( stated coco Granda told her this when she called the office on 05/06 ) no note in the chart that I can see     05/13 Morning 138/78 HR 96  Evening 128/73 HR 77  05/14 Afternoon 113/76   Evening 131/76 HR 77  05/15 late afternoon 122/72 HR 80  Evening 126/72 HR 70    Patient going to do the BMP tomorrow

## 2024-05-17 ENCOUNTER — APPOINTMENT (OUTPATIENT)
Dept: LAB | Facility: CLINIC | Age: 69
End: 2024-05-17
Payer: MEDICARE

## 2024-05-17 DIAGNOSIS — I10 ESSENTIAL HYPERTENSION: ICD-10-CM

## 2024-05-17 LAB
ANION GAP SERPL CALCULATED.3IONS-SCNC: 11 MMOL/L (ref 4–13)
BUN SERPL-MCNC: 22 MG/DL (ref 5–25)
CALCIUM SERPL-MCNC: 9.9 MG/DL (ref 8.4–10.2)
CHLORIDE SERPL-SCNC: 95 MMOL/L (ref 96–108)
CO2 SERPL-SCNC: 31 MMOL/L (ref 21–32)
CREAT SERPL-MCNC: 0.96 MG/DL (ref 0.6–1.3)
GFR SERPL CREATININE-BSD FRML MDRD: 61 ML/MIN/1.73SQ M
GLUCOSE P FAST SERPL-MCNC: 122 MG/DL (ref 65–99)
POTASSIUM SERPL-SCNC: 3.8 MMOL/L (ref 3.5–5.3)
SODIUM SERPL-SCNC: 137 MMOL/L (ref 135–147)

## 2024-05-17 PROCEDURE — 36415 COLL VENOUS BLD VENIPUNCTURE: CPT

## 2024-05-17 PROCEDURE — 80048 BASIC METABOLIC PNL TOTAL CA: CPT

## 2024-05-17 NOTE — TELEPHONE ENCOUNTER
Reviewed BP readings, improved.  Blood work within normal.    Continue medication (lisinopril-HCTZ).  You can check your BP a few times a week, can check other times of the day and not just an hour after taking medication.

## 2024-05-29 ENCOUNTER — APPOINTMENT (OUTPATIENT)
Dept: LAB | Facility: CLINIC | Age: 69
End: 2024-05-29

## 2024-05-29 ENCOUNTER — OFFICE VISIT (OUTPATIENT)
Dept: INTERNAL MEDICINE CLINIC | Facility: CLINIC | Age: 69
End: 2024-05-29
Payer: MEDICARE

## 2024-05-29 VITALS
HEIGHT: 68 IN | SYSTOLIC BLOOD PRESSURE: 140 MMHG | DIASTOLIC BLOOD PRESSURE: 82 MMHG | HEART RATE: 87 BPM | TEMPERATURE: 97.2 F | WEIGHT: 205.2 LBS | OXYGEN SATURATION: 98 % | BODY MASS INDEX: 31.1 KG/M2

## 2024-05-29 DIAGNOSIS — Z90.49 S/P CHOLECYSTECTOMY: ICD-10-CM

## 2024-05-29 DIAGNOSIS — R10.11 RIGHT UPPER QUADRANT ABDOMINAL PAIN: ICD-10-CM

## 2024-05-29 DIAGNOSIS — N18.2 CHRONIC KIDNEY DISEASE (CKD), STAGE II (MILD): ICD-10-CM

## 2024-05-29 DIAGNOSIS — I10 ESSENTIAL HYPERTENSION: Primary | ICD-10-CM

## 2024-05-29 DIAGNOSIS — R14.0 BLOATING: ICD-10-CM

## 2024-05-29 DIAGNOSIS — K22.70 BARRETT'S ESOPHAGUS WITHOUT DYSPLASIA: ICD-10-CM

## 2024-05-29 PROCEDURE — 99213 OFFICE O/P EST LOW 20 MIN: CPT | Performed by: INTERNAL MEDICINE

## 2024-05-29 PROCEDURE — G2211 COMPLEX E/M VISIT ADD ON: HCPCS | Performed by: INTERNAL MEDICINE

## 2024-05-29 NOTE — ASSESSMENT & PLAN NOTE
Lab Results   Component Value Date    EGFR 61 05/17/2024    EGFR 76 04/26/2024    EGFR 81 02/21/2024    CREATININE 0.96 05/17/2024    CREATININE 0.80 04/26/2024    CREATININE 0.76 02/21/2024     Avoid NSAIDs.

## 2024-05-29 NOTE — ASSESSMENT & PLAN NOTE
BP within normal, taking lisinopril-HCTZ.  Reviewed labs. May take home BP a few times a week only.

## 2024-05-29 NOTE — PROGRESS NOTES
Ambulatory Visit  Name: Dora Warner      : 1955      MRN: [id number]  Encounter Provider: Flower Beasley MD  Encounter Date: 2024   Encounter department: [facility] INTERNAL MEDICINE    Assessment & Plan   1. Essential hypertension  Assessment & Plan:  BP within normal, taking lisinopril-HCTZ.  Reviewed labs. May take home BP a few times a week only.  2. Chronic kidney disease (CKD), stage II (mild)  Assessment & Plan:  Lab Results   Component Value Date    EGFR 61 2024    EGFR 76 2024    EGFR 81 2024    CREATININE 0.96 2024    CREATININE 0.80 2024    CREATININE 0.76 2024     Avoid NSAIDs.  3. Bloating  -     H. pylori antigen, stool  4. Right upper quadrant abdominal pain  -     H. pylori antigen, stool  -     US right upper quadrant; Future; Expected date: 2024  5. S/P cholecystectomy  Assessment & Plan:  Recurrent of RUQ pain. Check ultrasound. Refer back to GI.  6. Dahl's esophagus without dysplasia  Assessment & Plan:  On daily PPI.    Follow up as scheduled or as needed.       History of Present Illness     She reports her blood pressure at home has been good. She has been checking it 1 to 3 times every day. SBP ranges 109-138, DBP range 68/86. She has a few readings in the low 140's (SBP), one reading in the 90's (DBP).    She complains of intermittent upper abdominal pain, usually in the right side, sometimes in the middle. It does not radiate. This has been ongoing for several weeks.  She feels pain is occurring more frequently during the day.  Pain worse when she bends down or squeezing her abdomen. She complains of more frequent burping and gas.  Denies any nausea or vomiting.  She moves her bowels daily.  She reports similar pains prior to her gallbladder surgery in 2021.  Denies any fever or chills, no back pain.    Abdominal Pain  This is a new problem. The current episode started 1 to 4 weeks ago. The onset  "quality is gradual. The problem occurs 2 to 4 times per day. The problem has been waxing and waning. The pain is located in the RUQ and epigastric region. The pain is at a severity of 6/10. The quality of the pain is a sensation of fullness and sharp. The abdominal pain radiates to the RUQ and epigastric region. Associated symptoms include anorexia, belching, diarrhea and flatus. Pertinent negatives include no constipation, dysuria, fever, frequency, headaches, hematochezia, hematuria, melena, myalgias, nausea, vomiting or weight loss.   Diarrhea   Associated symptoms include abdominal pain and increased flatus. Pertinent negatives include no fever, headaches, myalgias, vomiting or weight loss.       Review of Systems   Constitutional:  Negative for fever and weight loss.   Gastrointestinal:  Positive for abdominal pain, anorexia, diarrhea and flatus. Negative for constipation, hematochezia, melena, nausea and vomiting.   Genitourinary:  Negative for dysuria, frequency and hematuria.   Musculoskeletal:  Negative for myalgias.   Neurological:  Negative for headaches.       Objective     /82   Pulse 87   Temp (!) 97.2 °F (36.2 °C)   Ht 5' 8\" (1.727 m)   Wt 93.1 kg (205 lb 3.2 oz)   SpO2 98%   BMI 31.20 kg/m²     Physical Exam  Constitutional:       Appearance: She is well-developed.   HENT:      Head: Normocephalic and atraumatic.      Mouth/Throat:      Mouth: Mucous membranes are moist.   Eyes:      Pupils: Pupils are equal, round, and reactive to light.   Pulmonary:      Effort: Pulmonary effort is normal. No respiratory distress.   Abdominal:      Tenderness: There is abdominal tenderness. Positive signs include Quinones's sign.   Neurological:      General: No focal deficit present.      Mental Status: She is alert and oriented to person, place, and time.   Psychiatric:         Mood and Affect: Mood normal.         Behavior: Behavior normal.       Administrative Statements     "

## 2024-05-30 ENCOUNTER — APPOINTMENT (OUTPATIENT)
Dept: LAB | Facility: CLINIC | Age: 69
End: 2024-05-30
Payer: MEDICARE

## 2024-05-30 PROCEDURE — 87338 HPYLORI STOOL AG IA: CPT | Performed by: INTERNAL MEDICINE

## 2024-05-31 LAB — H PYLORI AG STL QL IA: NEGATIVE

## 2024-06-03 ENCOUNTER — HOSPITAL ENCOUNTER (OUTPATIENT)
Dept: ULTRASOUND IMAGING | Facility: HOSPITAL | Age: 69
Discharge: HOME | End: 2024-06-03
Payer: MEDICARE

## 2024-06-03 DIAGNOSIS — R10.11 RIGHT UPPER QUADRANT ABDOMINAL PAIN: ICD-10-CM

## 2024-06-03 PROCEDURE — 76705 ECHO EXAM OF ABDOMEN: CPT

## 2024-06-17 ENCOUNTER — TELEPHONE (OUTPATIENT)
Age: 69
End: 2024-06-17

## 2024-06-17 DIAGNOSIS — I10 ESSENTIAL HYPERTENSION: ICD-10-CM

## 2024-06-17 RX ORDER — LISINOPRIL AND HYDROCHLOROTHIAZIDE 20; 12.5 MG/1; MG/1
1 TABLET ORAL DAILY
Qty: 90 TABLET | Refills: 0 | Status: SHIPPED | OUTPATIENT
Start: 2024-06-17

## 2024-06-17 NOTE — TELEPHONE ENCOUNTER
I sent in a new prescription with a lower dose of the hctz. Now lisinopriol-hctz 20/12.5 instead of 20/25  Continue to monitor blood pressures and call if continue to be low

## 2024-06-17 NOTE — TELEPHONE ENCOUNTER
Pt returned call, message in chart from provider relayed.  Pt requested to speak with office, attempted to warm transfer, no answer.  Please call patient back as per her request.       Pt wanted to report her bp readings.    118/68 P 86  103/60 P 77  94/62 P 95  98/66 P 85  Pt feels ok, she wants to make sure these numbers are ok for her ,she is never that low.

## 2024-06-17 NOTE — TELEPHONE ENCOUNTER
Her last office visit in May, BP was on the higher side.   Anything change since then? Medications? Diet?   Any lightheadedness, headaches?

## 2024-06-17 NOTE — TELEPHONE ENCOUNTER
Patient states when she was in for the May appt, Dr. Beasley had adjusted her Medication and then she was in since then     Patient reports no headaches or lightheadedness, slight fatigue but has other things going on   Patient states it has never been that low   States PCP took her off the remapril and put her on Lisinopril HCTZ     Patient is leaving Sunday to go to [place] to watch her mother

## 2024-07-08 ENCOUNTER — TELEPHONE (OUTPATIENT)
Age: 69
End: 2024-07-08

## 2024-07-08 NOTE — TELEPHONE ENCOUNTER
Patient called to schedule full skin check with Dr. Presley , has history of bcc .   Appointment scheduled on 07/09

## 2024-07-09 ENCOUNTER — OFFICE VISIT (OUTPATIENT)
Dept: DERMATOLOGY | Facility: CLINIC | Age: 69
End: 2024-07-09
Payer: MEDICARE

## 2024-07-09 VITALS — TEMPERATURE: 98.1 F | HEIGHT: 68 IN | BODY MASS INDEX: 31.07 KG/M2 | WEIGHT: 205 LBS

## 2024-07-09 DIAGNOSIS — L65.0 TELOGEN EFFLUVIUM: ICD-10-CM

## 2024-07-09 DIAGNOSIS — D22.9 MULTIPLE MELANOCYTIC NEVI: Primary | ICD-10-CM

## 2024-07-09 DIAGNOSIS — L82.1 SEBORRHEIC KERATOSIS: ICD-10-CM

## 2024-07-09 DIAGNOSIS — D18.01 CHERRY ANGIOMA: ICD-10-CM

## 2024-07-09 DIAGNOSIS — L81.4 LENTIGO: ICD-10-CM

## 2024-07-09 DIAGNOSIS — L72.0 EPIDERMAL INCLUSION CYST: ICD-10-CM

## 2024-07-09 DIAGNOSIS — L85.3 XEROSIS OF SKIN: ICD-10-CM

## 2024-07-09 PROCEDURE — 99213 OFFICE O/P EST LOW 20 MIN: CPT | Performed by: DERMATOLOGY

## 2024-07-09 NOTE — PROGRESS NOTES
"[facility] Note     Patient Name: Dora Warner  Encounter Date: 07/09/2024    Have you been cared for by a [facility] Dermatologist in the last 3 years and, if so, which description applies to you?    Yes.  I have been here within the last 3 years, and my medical history has NOT changed since that time.  I am FEMALE/of child-bearing potential.    REVIEW OF SYSTEMS:  Have you recently had or currently have any of the following? No changes in my recent health.   PAST MEDICAL HISTORY:  Have you personally ever had or currently have any of the following?  If \"YES,\" then please provide more detail. No changes in my medical history.   HISTORY OF IMMUNOSUPPRESSION: Do you have a history of any of the following:  Systemic Immunosuppression such as Diabetes, Biologic or Immunotherapy, Chemotherapy, Organ Transplantation, Bone Marrow Transplantation?  No     Answering \"YES\" requires the addition of the dotphrase \"IMMUNOSUPPRESSED\" as the first diagnosis of the patient's visit.   FAMILY HISTORY:  Any \"first degree relatives\" (parent, brother, sister, or child) with the following?    No changes in my family's known health.   PATIENT EXPERIENCE:    Do you want the Dermatologist to perform a COMPLETE skin exam today including a clinical examination under the \"bra and underwear\" areas?  Yes  If necessary, do we have your permission to call and leave a detailed message on your Preferred Phone number that includes your specific medical information?  Yes      Allergies   Allergen Reactions    Drug [Diclofenac Sodium] Tongue Swelling    Garlic - Food Allergy Throat Swelling    Latex Anaphylaxis     Latex and Black Rubber    Meperidine Tongue Swelling    Nsaids GI Bleeding     Rectal bleeding    Other Throat Swelling     \"Perfumes\"    Percocet [Oxycodone-Acetaminophen] Itching     Norco ok     Formaldehyde Other (See Comments)     + Skin Test    Levaquin [Levofloxacin] Other (See Comments)     Achilles tendon " "pain    Adhesive [Medical Tape] Rash    Penicillins Rash    Sulfa Antibiotics Rash      Current Outpatient Medications:     aspirin 81 mg chewable tablet, Chew, Disp: , Rfl:     Cholecalciferol (Vitamin D3) 50 MCG (2000 UT) TABS, Take by mouth  , Disp: , Rfl:     EPINEPHrine (EPIPEN) 0.3 mg/0.3 mL SOAJ, Inject 0.3 mL (0.3 mg total) into a muscle once for 1 dose, Disp: 0.6 mL, Rfl: 0    lisinopril-hydrochlorothiazide (PRINZIDE,ZESTORETIC) 20-12.5 MG per tablet, Take 1 tablet by mouth daily, Disp: 90 tablet, Rfl: 0    Multiple Vitamins-Minerals (CENTRUM ADULTS PO), Take by mouth, Disp: , Rfl:     omeprazole (PriLOSEC) 20 mg delayed release capsule, Take 20 mg by mouth daily in the early morning  , Disp: , Rfl:     atorvastatin (LIPITOR) 20 mg tablet, Take 1 tablet (20 mg total) by mouth daily (Patient not taking: Reported on 9/6/2023), Disp: 30 tablet, Rfl: 0    Menaquinone-7 (Vitamin K2) 100 MCG CAPS, Take 100 mcg by mouth daily (Patient not taking: Reported on 7/9/2024), Disp: , Rfl:           Whom besides the patient is providing clinical information about today's encounter?   NO ADDITIONAL HISTORIAN (patient alone provided history)    Physical Exam and Assessment/Plan by Diagnosis:    CHIEF COMPLAINT    69 year old female patient presents today for Yearly Follow Up.  Patient has a History of basal cell carcinoma     MELANOCYTIC NEVI (\"Moles\")    Physical Exam:  Anatomic Location Affected:   Mostly on sun-exposed areas of the trunk and extremities  Morphological Description:  Scattered, 1-4mm round to ovoid, symmetrical-appearing, even bordered, skin colored to dark brown macules/papules, mostly in sun-exposed areas  Pertinent Positives:  Pertinent Negatives:    Additional History of Present Condition:      Assessment and Plan:  Based on a thorough discussion of this condition and the management approach to it (including a comprehensive discussion of the known risks, side effects and potential benefits of " "treatment), the patient (family) agrees to implement the following specific plan:  When outside we recommend using a wide brim hat, sunglasses, long sleeve and pants, sunscreen with SPF 30+ with reapplication every 2 hours, or SPF specific clothing   Benign, reassured  Annual skin check     Melanocytic Nevi  Melanocytic nevi (\"moles\") are tan or brown, raised or flat areas of the skin which have an increased number of melanocytes. Melanocytes are the cells in our body which make pigment and account for skin color.    Some moles are present at birth (I.e., \"congenital nevi\"), while others come up later in life (i.e., \"acquired nevi\").  The sun can stimulate the body to make more moles.  Sunburns are not the only thing that triggers more moles.  Chronic sun exposure can do it too.     Clinically distinguishing a healthy mole from melanoma may be difficult, even for experienced dermatologists. The \"ABCDE's\" of moles have been suggested as a means of helping to alert a person to a suspicious mole and the possible increased risk of melanoma.  The suggestions for raising alert are as follows:    Asymmetry: Healthy moles tend to be symmetric, while melanomas are often asymmetric.  Asymmetry means if you draw a line through the mole, the two halves do not match in color, size, shape, or surface texture. Asymmetry can be a result of rapid enlargement of a mole, the development of a raised area on a previously flat lesion, scaling, ulceration, bleeding or scabbing within the mole.  Any mole that starts to demonstrate \"asymmetry\" should be examined promptly by a board certified dermatologist.     Border: Healthy moles tend to have discrete, even borders.  The border of a melanoma often blends into the normal skin and does not sharply delineate the mole from normal skin.  Any mole that starts to demonstrate \"uneven borders\" should be examined promptly by a board certified dermatologist.     Color: Healthy moles tend to be one " "color throughout.  Melanomas tend to be made up of different colors ranging from dark black, blue, white, or red.  Any mole that demonstrates a color change should be examined promptly by a board certified dermatologist.     Diameter: Healthy moles tend to be smaller than 0.6 cm in size; an exception are \"congenital nevi\" that can be larger.  Melanomas tend to grow and can often be greater than 0.6 cm (1/4 of an inch, or the size of a pencil eraser). This is only a guideline, and many normal moles may be larger than 0.6 cm without being unhealthy.  Any mole that starts to change in size (small to bigger or bigger to smaller) should be examined promptly by a board certified dermatologist.     Evolving: Healthy moles tend to \"stay the same.\"  Melanomas may often show signs of change or evolution such as a change in size, shape, color, or elevation.  Any mole that starts to itch, bleed, crust, burn, hurt, or ulcerate or demonstrate a change or evolution should be examined promptly by a board certified dermatologist.        LENTIGO    Physical Exam:  Anatomic Location Affected:  trunk, arms  Morphological Description:  Light brown macules  Pertinent Positives:  Pertinent Negatives:    Additional History of Present Condition:      Assessment and Plan:  Based on a thorough discussion of this condition and the management approach to it (including a comprehensive discussion of the known risks, side effects and potential benefits of treatment), the patient (family) agrees to implement the following specific plan:  When outside we recommend using a wide brim hat, sunglasses, long sleeve and pants, sunscreen with SPF 30+ with reapplication every 2 hours, or SPF specific clothing       What is a lentigo?  A lentigo is a pigmented flat or slightly raised lesion with a clearly defined edge. Unlike an ephelis (freckle), it does not fade in the winter months. There are several kinds of lentigo.  The name lentigo originally referred " to its appearance resembling a small lentil. The plural of lentigo is lentigines, although “lentigos” is also in common use.    Who gets lentigines?  Lentigines can affect males and females of all ages and races. Solar lentigines are especially prevalent in fair skinned adults. Lentigines associated with syndromes are present at birth or arise during childhood.    What causes lentigines?  Common forms of lentigo are due to exposure to ultraviolet radiation:  Sun damage including sunburn   Indoor tanning   Phototherapy, especially photochemotherapy (PUVA)    Ionizing radiation, eg radiation therapy, can also cause lentigines.  Several familial syndromes associated with widespread lentigines originate from mutations in Roc-MAP kinase, mTOR signaling and PTEN pathways.    What is the treatment for lentigines?  Most lentigines are left alone. Attempts to lighten them may not be successful. The following approaches are used:  SPF 50+ broad-spectrum sunscreen   Hydroquinone bleaching cream   Alpha hydroxy acids   Vitamin C   Retinoids   Azelaic acid   Chemical peels  Individual lesions can be permanently removed using:  Cryotherapy   Intense pulsed light   Pigment lasers    How can lentigines be prevented?  Lentigines associated with exposure ultraviolet radiation can be prevented by very careful sun protection. Clothing is more successful at preventing new lentigines than are sunscreens.    What is the outlook for lentigines?  Lentigines usually persist. They may increase in number with age and sun exposure. Some in sun-protected sites may fade and disappear.    MONTGOMERY ANGIOMAS    Physical Exam:  Anatomic Location Affected:  trunk  Morphological Description:  Scattered cherry red, 1-4 mm papules.  Pertinent Positives:  Pertinent Negatives:    Additional History of Present Condition:      Assessment and Plan:  Based on a thorough discussion of this condition and the management approach to it (including a comprehensive  "discussion of the known risks, side effects and potential benefits of treatment), the patient (family) agrees to implement the following specific plan:  Monitor for changes  Benign, reassured      Assessment and Plan:    Cherry angioma, also known as Nguyen de Brian spots, are benign vascular skin lesions. A \"cherry angioma\" is a firm red, blue or purple papule, 0.1-1 cm in diameter. When thrombosed, they can appear black in colour until evaluated with a dermatoscope when the red or purple colour is more easily seen. Cherry angioma may develop on any part of the body but most often appear on the scalp, face, lips and trunk.  An angioma is due to proliferating endothelial cells; these are the cells that line the inside of a blood vessel.    Angiomas can arise in early life or later in life; the most common type of angioma is a cherry angioma.  Cherry angiomas are very common in males and females of any age or race. They are more noticeable in white skin than in skin of colour. They markedly increase in number from about the age of 40. There may be a family history of similar lesions. Eruptive cherry angiomas have been rarely reported to be associated with internal malignancy. The cause of angiomas is unknown. Genetic analysis of cherry angiomas has shown that they frequently carry specific somatic missense mutations in the GNAQ and GNA11 (Q209H) genes, which are involved in other vascular and melanocytic proliferations.      SEBORRHEIC KERATOSIS; NON-INFLAMED    Physical Exam:  Anatomic Location Affected:  trunk  Morphological Description:  Flat and raised, waxy, smooth to warty textured, yellow to brownish-grey to dark brown to blackish, discrete, \"stuck-on\" appearing papules.  Pertinent Positives:  Pertinent Negatives:    Additional History of Present Condition:      Assessment and Plan:  Based on a thorough discussion of this condition and the management approach to it (including a comprehensive discussion of " "the known risks, side effects and potential benefits of treatment), the patient (family) agrees to implement the following specific plan:  Monitor for changes  Benign, reassured      Seborrheic Keratosis  A seborrheic keratosis is a harmless warty spot that appears during adult life as a common sign of skin aging.  Seborrheic keratoses can arise on any area of skin, covered or uncovered, with the usual exception of the palms and soles. They do not arise from mucous membranes. Seborrheic keratoses can have highly variable appearance.      Seborrheic keratoses are extremely common. It has been estimated that over 90% of adults over the age of 60 years have one or more of them. They occur in males and females of all races, typically beginning to erupt in the 30s or 40s. They are uncommon under the age of 20 years.  The precise cause of seborrhoeic keratoses is not known.  Seborrhoeic keratoses are considered degenerative in nature. As time goes by, seborrheic keratoses tend to become more numerous. Some people inherit a tendency to develop a very large number of them; some people may have hundreds of them.      There is no easy way to remove multiple lesions on a single occasion.  Unless a specific lesion is \"inflamed\" and is causing pain or stinging/burning or is bleeding, most insurance companies do not authorize treatment.    XEROSIS (\"DRY SKIN\")    Physical Exam:  Anatomic Location Affected:  diffuse  Morphological Description:  xerosis  Pertinent Positives:  Pertinent Negatives:    Additional History of Present Condition:      Assessment and Plan:  Based on a thorough discussion of this condition and the management approach to it (including a comprehensive discussion of the known risks, side effects and potential benefits of treatment), the patient (family) agrees to implement the following specific plan:  Use moisturizer like Eucerin,Cerave or Aveeno Cream 3 times a day for the dry skin            Dry skin refers " to skin that feels dry to touch. Dry skin has a dull surface with a rough, scaly quality. The skin is less pliable and cracked. When dryness is severe, the skin may become inflamed and fissured.  Although any body site can be dry, dry skin tends to affect the shins more than any other site.    Dry skin is lacking moisture in the outer horny cell layer (stratum corneum) and this results in cracks in the skin surface.  Dry skin is also called xerosis, xeroderma or asteatosis (lack of fat).  It can affect males and females of all ages. There is some racial variability in water and lipid content of the skin.  Dry skin that starts in early childhood may be one of about 20 types of ichthyosis (fish-scale skin). There is often a family history of dry skin.   Dry skin is commonly seen in people with atopic dermatitis.  Nearly everyone > 60 years has dry skin.    Dry skin that begins later may be seen in people with certain diseases and conditions.  Postmenopausal women  Hypothyroidism  Chronic renal disease   Malnutrition and weight loss   Subclinical dermatitis   Treatment with certain drugs such as oral retinoids, diuretics and epidermal growth factor receptor inhibitors      What is the treatment for dry skin?  The mainstay of treatment of dry skin and ichthyosis is moisturisers/emollients. They should be applied liberally and often enough to:  Reduce itch   Improve the barrier function   Prevent entry of irritants, bacteria   Reduce transepidermal water loss.      How can dry skin be prevented?  Eliminate aggravating factors:  Reduce the frequency of bathing.   A humidifier in winter and air conditioner in summer   Compare having a short shower with a prolonged soak in a bath.   Use lukewarm, not hot, water.   Replace standard soap with a substitute such as a synthetic detergent cleanser, water-miscible emollient, bath oil, anti-pruritic tar oil, colloidal oatmeal etc.   Apply an emollient liberally and often,  particularly shortly after bathing, and when itchy. The drier the skin, the thicker this should be, especially on the hands.    What is the outlook for dry skin?  A tendency to dry skin may persist life-long, or it may improve once contributing factors are controlled.     EPIDERMAL INCLUSION CYST    Physical Exam:  Anatomic Location Affected:  posterior left ear   Morphological Description:  subcutaneous nodule   Pertinent Positives:  Pertinent Negatives:    Additional History of Present Condition:  Patient concern about area     Assessment and Plan:  Based on a thorough discussion of this condition and the management approach to it (including a comprehensive discussion of the known risks, side effects and potential benefits of treatment), the patient (family) agrees to implement the following specific plan:  Reassured benign   Discussed excision if desired     What are epidermal inclusion cysts?  Epidermal inclusion cysts are the most common, benign cutaneous cysts. There are many different names for epidermal inclusion cysts, including epidermoid cyst, epidermal cyst, infundibular cyst, inclusion cyst, and keratin cyst. These cysts can occur anywhere on the body and typically present as nodules directly underneath the skin. There is often a visible pore or opening in the center. The cysts are freely moveable and can range from a few millimeters to several centimeters in diameter. The center of epidermoid cysts almost always contains keratin, which has a cheesy appearance, and not sebum. They also do not originate from sebaceous glands. Therefore, epidermal inclusion cysts are not the same as sebaceous cysts.    Cysts may remain stable or progressively enlarge over time. There are no reliable predictive factors to tell if an epidermal inclusion cyst will enlarge, become inflamed, or remain quiescent. Infected cysts tend to become larger, turn red, and are more noticeable to the patient. There may be accompanying  pain and discomfort.     What causes epidermal inclusion cysts?  Epidermal inclusion cysts often appear out of the blue and are not contagious. They are due to a proliferation of epidermal cells within the dermis and are more common in men than women. They occur more frequently in patients in their 20s to 40s. Epidermal inclusion cysts by themselves are usually not inherited, but they can be hereditary in rare syndromes such as Gonzalez syndrome, nodular elastosis with cysts and comedones (Favre-Racouchot syndrome), and basal cell nevus syndrome (Gorlin syndrome). Elderly patients with chronic sun-damaged skin areas have a higher likelihood of developing epidermoid cysts. They often occur in areas where hair follicles have been inflamed or repeatedly irritated are more frequent in patients with acne vulgaris. In the  period, they are called milia.     Patients on BRAF inhibitors such as imiquimod and cyclosporine have a higher incidence of epidermoid cysts of the face.    How do we diagnose an epidermal inclusion cyst?  Epidermoid inclusion cysts are often diagnosed by history and physical exam. There is usually no need for biopsy prior to removal.  Radiographic and laboratory exams, such as ultrasound studies, are unnecessary and not typically ordered unless the practitioner suspects a genetic condition.    What is the treatment for an epidermal inclusion cyst?  Inflamed, uninfected epidermal inclusion cysts rarely resolve spontaneously without therapy or surgical intervention. Treatment is not emergent unless desired by the patient.     Definitive treatment is via surgical excision with walls intact. This method will prevent recurrence. This is best done when the cyst is not inflamed, to decrease the probability of rupture during surgery.   A local anesthetic will be injected around the cyst  A small incision is made in the skin overlying the cyst, and contents are expressed  The incision is repaired with  sutures    Another option is to use a 4mm punch biopsy with cyst extraction through the defect.    Incision and drainage is often needed if the cyst is infected or inflamed. If there is surrounding cellulitis, oral antibiotic therapy may be necessary. The common agents used target methicillin sensitive Staphylococcal aureus and methicillin resistant S aureus in areas of high prevalence.      TELOGEN EFFLUVIUM    Physical Exam:  Anatomic Location Affected:  scalp  Morphological Description:  describe hair loss by the root bulb   Pertinent Positives:  Pertinent Negatives:    Additional History of Present Condition:  Patient states has been having hair loss for couple of years. Patient states she is under a lot of stress.     Assessment and Plan:  Based on a thorough discussion of this condition and the management approach to it (including a comprehensive discussion of the known risks, side effects and potential benefits of treatment), the patient (family) agrees to implement the following specific plan:  Monitor     What is telogen effluvium?  Telogen effluvium is the name for temporary hair loss due to the shedding of resting or telogen hair after some shock to the system. New hair continues to grow. Telogen hair has a bulb or club-shaped tip.    It should be distinguished from anagen effluvium, in which the hair shedding is due to interruption of active or anagen hair growth by drugs, toxins or inflammation (eg, alopecia areata). Anagen hair has a pointed or tapered tip.    What is the cause of telogen effluvium?  In a normal healthy person's scalp about 85% of the hair follicles are actively growing hair (anagen hair) and 15% are resting hair (telogen hair). A hair follicle usually grows anagen hair for 4 years or so, then rests for about 4 months. The resting or telogen hair has a club or bulb at the tip. A new anagen hair begins to grow under the resting telogen hair and pushes it out. Thus, it is normal to lose  "up to about 100 hairs a day on one's comb, brush, in the basin or on the pillow, as a result of the normal scalp hair cycle.    If there is some shock to the system, as many as 70% of the anagen hairs can be precipitated into telogen, thus reversing the usual ratio.     Typical \"shock\" triggers include:  Illness, especially if there is fever   Surgical operation   Accident   Childbirth   Nervous shock   Weight loss or unusual diet   Certain medications   Discontinuing the contraceptive pill   Overseas travel resulting in jetlag   Excessive sun exposure    At first, the resting scalp hairs, now in the form of club hairs, remain firmly attached to the hair follicles. It is only about 2-4 months after the shock that the new hairs coming up through the scalp push out the 'dead' club hairs and increased hair fall are noticed.    Thus, paradoxically, with this type of hair loss, hair fall is a sign of hair regrowth. As the new hair first comes up through the scalp and pushes out the dead hair a fine fringe of new hair is often evident along the forehead hairline. At first, the fall of club hairs is profuse and a general thinning of the scalp hair may become evident but after several months a peak is reached and hair fall begins to lessen, gradually tapering back to normal over 6-9 months. As the hair fall tapers off the scalp thickens back up to normal, but recovery may be incomplete in some cases.    Because nail and hair growth are under the same influences, an arrest in hair growth is often mirrored in the nails by a groove across them coinciding with the time of the shock to the system. This is called Beau's line.     The time of the shock can be estimated from the fact that a fingernail takes 5 months to grow from the posterior nail fold to the free edge. So if the groove in the nail is half way down the nail then the shock must have been 2 1/2 months ago.    Chronic telogen effluvium  In some patients, hair shedding " continues to be intermittently or continuously greater than normal for long periods of time, sometimes for years. The hair cycle appears to be reset so that the anagen period is shortened.    Chronic telogen effluvium often presents in women that actually continue to have quite thick and moderately long hair - this is because they notice the shed hair more than those with finer or shorter hair. Telogen effluvium does not cause complete baldness, although it may unmask a genetic tendency to genetic balding i.e. female pattern hair loss, or in men, male pattern hair loss.    What is the treatment for telogen effluvium?  Telogen effluvium is self-correcting. It is really not influenced by any treatment that can be given. However, gentle handling of the hair, avoiding over-vigorous combing, brushing and any type of scalp massage are important.    You should also ensure a nutritious diet, with plenty of protein, fruit and vegetables.    The doctor may check your thyroid function, and levels of iron, vitamin B12 and folic acid, as any deficiency in these can slow hair growth. The psychological effects of hair loss should not be ignored.     Scribe Attestation      I,:  Karina Lloyd MA am acting as a scribe while in the presence of the attending physician.:       I,:  Nory Presley MD personally performed the services described in this documentation    as scribed in my presence.:

## 2024-07-09 NOTE — PATIENT INSTRUCTIONS
"MELANOCYTIC NEVI (\"Moles\")        Assessment and Plan:  Based on a thorough discussion of this condition and the management approach to it (including a comprehensive discussion of the known risks, side effects and potential benefits of treatment), the patient (family) agrees to implement the following specific plan:  When outside we recommend using a wide brim hat, sunglasses, long sleeve and pants, sunscreen with SPF 30+ with reapplication every 2 hours, or SPF specific clothing   Benign, reassured  Annual skin check     Melanocytic Nevi  Melanocytic nevi (\"moles\") are tan or brown, raised or flat areas of the skin which have an increased number of melanocytes. Melanocytes are the cells in our body which make pigment and account for skin color.    Some moles are present at birth (I.e., \"congenital nevi\"), while others come up later in life (i.e., \"acquired nevi\").  The sun can stimulate the body to make more moles.  Sunburns are not the only thing that triggers more moles.  Chronic sun exposure can do it too.     Clinically distinguishing a healthy mole from melanoma may be difficult, even for experienced dermatologists. The \"ABCDE's\" of moles have been suggested as a means of helping to alert a person to a suspicious mole and the possible increased risk of melanoma.  The suggestions for raising alert are as follows:    Asymmetry: Healthy moles tend to be symmetric, while melanomas are often asymmetric.  Asymmetry means if you draw a line through the mole, the two halves do not match in color, size, shape, or surface texture. Asymmetry can be a result of rapid enlargement of a mole, the development of a raised area on a previously flat lesion, scaling, ulceration, bleeding or scabbing within the mole.  Any mole that starts to demonstrate \"asymmetry\" should be examined promptly by a board certified dermatologist.     Border: Healthy moles tend to have discrete, even borders.  The border of a melanoma often blends " "into the normal skin and does not sharply delineate the mole from normal skin.  Any mole that starts to demonstrate \"uneven borders\" should be examined promptly by a board certified dermatologist.     Color: Healthy moles tend to be one color throughout.  Melanomas tend to be made up of different colors ranging from dark black, blue, white, or red.  Any mole that demonstrates a color change should be examined promptly by a board certified dermatologist.     Diameter: Healthy moles tend to be smaller than 0.6 cm in size; an exception are \"congenital nevi\" that can be larger.  Melanomas tend to grow and can often be greater than 0.6 cm (1/4 of an inch, or the size of a pencil eraser). This is only a guideline, and many normal moles may be larger than 0.6 cm without being unhealthy.  Any mole that starts to change in size (small to bigger or bigger to smaller) should be examined promptly by a board certified dermatologist.     Evolving: Healthy moles tend to \"stay the same.\"  Melanomas may often show signs of change or evolution such as a change in size, shape, color, or elevation.  Any mole that starts to itch, bleed, crust, burn, hurt, or ulcerate or demonstrate a change or evolution should be examined promptly by a board certified dermatologist.        LENTIGO        Assessment and Plan:  Based on a thorough discussion of this condition and the management approach to it (including a comprehensive discussion of the known risks, side effects and potential benefits of treatment), the patient (family) agrees to implement the following specific plan:  When outside we recommend using a wide brim hat, sunglasses, long sleeve and pants, sunscreen with SPF 30+ with reapplication every 2 hours, or SPF specific clothing       What is a lentigo?  A lentigo is a pigmented flat or slightly raised lesion with a clearly defined edge. Unlike an ephelis (freckle), it does not fade in the winter months. There are several kinds of " lentigo.  The name lentigo originally referred to its appearance resembling a small lentil. The plural of lentigo is lentigines, although “lentigos” is also in common use.    Who gets lentigines?  Lentigines can affect males and females of all ages and races. Solar lentigines are especially prevalent in fair skinned adults. Lentigines associated with syndromes are present at birth or arise during childhood.    What causes lentigines?  Common forms of lentigo are due to exposure to ultraviolet radiation:  Sun damage including sunburn   Indoor tanning   Phototherapy, especially photochemotherapy (PUVA)    Ionizing radiation, eg radiation therapy, can also cause lentigines.  Several familial syndromes associated with widespread lentigines originate from mutations in Roc-MAP kinase, mTOR signaling and PTEN pathways.    What is the treatment for lentigines?  Most lentigines are left alone. Attempts to lighten them may not be successful. The following approaches are used:  SPF 50+ broad-spectrum sunscreen   Hydroquinone bleaching cream   Alpha hydroxy acids   Vitamin C   Retinoids   Azelaic acid   Chemical peels  Individual lesions can be permanently removed using:  Cryotherapy   Intense pulsed light   Pigment lasers    How can lentigines be prevented?  Lentigines associated with exposure ultraviolet radiation can be prevented by very careful sun protection. Clothing is more successful at preventing new lentigines than are sunscreens.    What is the outlook for lentigines?  Lentigines usually persist. They may increase in number with age and sun exposure. Some in sun-protected sites may fade and disappear.    MONTGOMERY ANGIOMAS      Assessment and Plan:  Based on a thorough discussion of this condition and the management approach to it (including a comprehensive discussion of the known risks, side effects and potential benefits of treatment), the patient (family) agrees to implement the following specific plan:  Monitor for  "changes  Benign, reassured      Assessment and Plan:    Cherry angioma, also known as Nguyen de Brian spots, are benign vascular skin lesions. A \"cherry angioma\" is a firm red, blue or purple papule, 0.1-1 cm in diameter. When thrombosed, they can appear black in colour until evaluated with a dermatoscope when the red or purple colour is more easily seen. Cherry angioma may develop on any part of the body but most often appear on the scalp, face, lips and trunk.  An angioma is due to proliferating endothelial cells; these are the cells that line the inside of a blood vessel.    Angiomas can arise in early life or later in life; the most common type of angioma is a cherry angioma.  Cherry angiomas are very common in males and females of any age or race. They are more noticeable in white skin than in skin of colour. They markedly increase in number from about the age of 40. There may be a family history of similar lesions. Eruptive cherry angiomas have been rarely reported to be associated with internal malignancy. The cause of angiomas is unknown. Genetic analysis of cherry angiomas has shown that they frequently carry specific somatic missense mutations in the GNAQ and GNA11 (Q209H) genes, which are involved in other vascular and melanocytic proliferations.      SEBORRHEIC KERATOSIS; NON-INFLAMED        Assessment and Plan:  Based on a thorough discussion of this condition and the management approach to it (including a comprehensive discussion of the known risks, side effects and potential benefits of treatment), the patient (family) agrees to implement the following specific plan:  Monitor for changes  Benign, reassured      Seborrheic Keratosis  A seborrheic keratosis is a harmless warty spot that appears during adult life as a common sign of skin aging.  Seborrheic keratoses can arise on any area of skin, covered or uncovered, with the usual exception of the palms and soles. They do not arise from mucous " "membranes. Seborrheic keratoses can have highly variable appearance.      Seborrheic keratoses are extremely common. It has been estimated that over 90% of adults over the age of 60 years have one or more of them. They occur in males and females of all races, typically beginning to erupt in the 30s or 40s. They are uncommon under the age of 20 years.  The precise cause of seborrhoeic keratoses is not known.  Seborrhoeic keratoses are considered degenerative in nature. As time goes by, seborrheic keratoses tend to become more numerous. Some people inherit a tendency to develop a very large number of them; some people may have hundreds of them.      There is no easy way to remove multiple lesions on a single occasion.  Unless a specific lesion is \"inflamed\" and is causing pain or stinging/burning or is bleeding, most insurance companies do not authorize treatment.    XEROSIS (\"DRY SKIN\")        Assessment and Plan:  Based on a thorough discussion of this condition and the management approach to it (including a comprehensive discussion of the known risks, side effects and potential benefits of treatment), the patient (family) agrees to implement the following specific plan:  Use moisturizer like Eucerin,Cerave or Aveeno Cream 3 times a day for the dry skin            Dry skin refers to skin that feels dry to touch. Dry skin has a dull surface with a rough, scaly quality. The skin is less pliable and cracked. When dryness is severe, the skin may become inflamed and fissured.  Although any body site can be dry, dry skin tends to affect the shins more than any other site.    Dry skin is lacking moisture in the outer horny cell layer (stratum corneum) and this results in cracks in the skin surface.  Dry skin is also called xerosis, xeroderma or asteatosis (lack of fat).  It can affect males and females of all ages. There is some racial variability in water and lipid content of the skin.  Dry skin that starts in early " childhood may be one of about 20 types of ichthyosis (fish-scale skin). There is often a family history of dry skin.   Dry skin is commonly seen in people with atopic dermatitis.  Nearly everyone > 60 years has dry skin.    Dry skin that begins later may be seen in people with certain diseases and conditions.  Postmenopausal women  Hypothyroidism  Chronic renal disease   Malnutrition and weight loss   Subclinical dermatitis   Treatment with certain drugs such as oral retinoids, diuretics and epidermal growth factor receptor inhibitors      What is the treatment for dry skin?  The mainstay of treatment of dry skin and ichthyosis is moisturisers/emollients. They should be applied liberally and often enough to:  Reduce itch   Improve the barrier function   Prevent entry of irritants, bacteria   Reduce transepidermal water loss.      How can dry skin be prevented?  Eliminate aggravating factors:  Reduce the frequency of bathing.   A humidifier in winter and air conditioner in summer   Compare having a short shower with a prolonged soak in a bath.   Use lukewarm, not hot, water.   Replace standard soap with a substitute such as a synthetic detergent cleanser, water-miscible emollient, bath oil, anti-pruritic tar oil, colloidal oatmeal etc.   Apply an emollient liberally and often, particularly shortly after bathing, and when itchy. The drier the skin, the thicker this should be, especially on the hands.    What is the outlook for dry skin?  A tendency to dry skin may persist life-long, or it may improve once contributing factors are controlled.     EPIDERMAL INCLUSION CYST      Assessment and Plan:  Based on a thorough discussion of this condition and the management approach to it (including a comprehensive discussion of the known risks, side effects and potential benefits of treatment), the patient (family) agrees to implement the following specific plan:  Reassured benign   Discussed excision if desired     What are  epidermal inclusion cysts?  Epidermal inclusion cysts are the most common, benign cutaneous cysts. There are many different names for epidermal inclusion cysts, including epidermoid cyst, epidermal cyst, infundibular cyst, inclusion cyst, and keratin cyst. These cysts can occur anywhere on the body and typically present as nodules directly underneath the skin. There is often a visible pore or opening in the center. The cysts are freely moveable and can range from a few millimeters to several centimeters in diameter. The center of epidermoid cysts almost always contains keratin, which has a cheesy appearance, and not sebum. They also do not originate from sebaceous glands. Therefore, epidermal inclusion cysts are not the same as sebaceous cysts.    Cysts may remain stable or progressively enlarge over time. There are no reliable predictive factors to tell if an epidermal inclusion cyst will enlarge, become inflamed, or remain quiescent. Infected cysts tend to become larger, turn red, and are more noticeable to the patient. There may be accompanying pain and discomfort.     What causes epidermal inclusion cysts?  Epidermal inclusion cysts often appear out of the blue and are not contagious. They are due to a proliferation of epidermal cells within the dermis and are more common in men than women. They occur more frequently in patients in their 20s to 40s. Epidermal inclusion cysts by themselves are usually not inherited, but they can be hereditary in rare syndromes such as Gonzalez syndrome, nodular elastosis with cysts and comedones (Favre-Racouchot syndrome), and basal cell nevus syndrome (Gorlin syndrome). Elderly patients with chronic sun-damaged skin areas have a higher likelihood of developing epidermoid cysts. They often occur in areas where hair follicles have been inflamed or repeatedly irritated are more frequent in patients with acne vulgaris. In the  period, they are called milia.     Patients on  BRAF inhibitors such as imiquimod and cyclosporine have a higher incidence of epidermoid cysts of the face.    How do we diagnose an epidermal inclusion cyst?  Epidermoid inclusion cysts are often diagnosed by history and physical exam. There is usually no need for biopsy prior to removal.  Radiographic and laboratory exams, such as ultrasound studies, are unnecessary and not typically ordered unless the practitioner suspects a genetic condition.    What is the treatment for an epidermal inclusion cyst?  Inflamed, uninfected epidermal inclusion cysts rarely resolve spontaneously without therapy or surgical intervention. Treatment is not emergent unless desired by the patient.     Definitive treatment is via surgical excision with walls intact. This method will prevent recurrence. This is best done when the cyst is not inflamed, to decrease the probability of rupture during surgery.   A local anesthetic will be injected around the cyst  A small incision is made in the skin overlying the cyst, and contents are expressed  The incision is repaired with sutures    Another option is to use a 4mm punch biopsy with cyst extraction through the defect.    Incision and drainage is often needed if the cyst is infected or inflamed. If there is surrounding cellulitis, oral antibiotic therapy may be necessary. The common agents used target methicillin sensitive Staphylococcal aureus and methicillin resistant S aureus in areas of high prevalence.      TELOGEN EFFLUVIUM        Assessment and Plan:  Based on a thorough discussion of this condition and the management approach to it (including a comprehensive discussion of the known risks, side effects and potential benefits of treatment), the patient (family) agrees to implement the following specific plan:  Monitor     What is telogen effluvium?  Telogen effluvium is the name for temporary hair loss due to the shedding of resting or telogen hair after some shock to the system. New  "hair continues to grow. Telogen hair has a bulb or club-shaped tip.    It should be distinguished from anagen effluvium, in which the hair shedding is due to interruption of active or anagen hair growth by drugs, toxins or inflammation (eg, alopecia areata). Anagen hair has a pointed or tapered tip.    What is the cause of telogen effluvium?  In a normal healthy person's scalp about 85% of the hair follicles are actively growing hair (anagen hair) and 15% are resting hair (telogen hair). A hair follicle usually grows anagen hair for 4 years or so, then rests for about 4 months. The resting or telogen hair has a club or bulb at the tip. A new anagen hair begins to grow under the resting telogen hair and pushes it out. Thus, it is normal to lose up to about 100 hairs a day on one's comb, brush, in the basin or on the pillow, as a result of the normal scalp hair cycle.    If there is some shock to the system, as many as 70% of the anagen hairs can be precipitated into telogen, thus reversing the usual ratio.     Typical \"shock\" triggers include:  Illness, especially if there is fever   Surgical operation   Accident   Childbirth   Nervous shock   Weight loss or unusual diet   Certain medications   Discontinuing the contraceptive pill   Overseas travel resulting in jetlag   Excessive sun exposure    At first, the resting scalp hairs, now in the form of club hairs, remain firmly attached to the hair follicles. It is only about 2-4 months after the shock that the new hairs coming up through the scalp push out the 'dead' club hairs and increased hair fall are noticed.    Thus, paradoxically, with this type of hair loss, hair fall is a sign of hair regrowth. As the new hair first comes up through the scalp and pushes out the dead hair a fine fringe of new hair is often evident along the forehead hairline. At first, the fall of club hairs is profuse and a general thinning of the scalp hair may become evident but after several " months a peak is reached and hair fall begins to lessen, gradually tapering back to normal over 6-9 months. As the hair fall tapers off the scalp thickens back up to normal, but recovery may be incomplete in some cases.    Because nail and hair growth are under the same influences, an arrest in hair growth is often mirrored in the nails by a groove across them coinciding with the time of the shock to the system. This is called Beau's line.     The time of the shock can be estimated from the fact that a fingernail takes 5 months to grow from the posterior nail fold to the free edge. So if the groove in the nail is half way down the nail then the shock must have been 2 1/2 months ago.    Chronic telogen effluvium  In some patients, hair shedding continues to be intermittently or continuously greater than normal for long periods of time, sometimes for years. The hair cycle appears to be reset so that the anagen period is shortened.    Chronic telogen effluvium often presents in women that actually continue to have quite thick and moderately long hair - this is because they notice the shed hair more than those with finer or shorter hair. Telogen effluvium does not cause complete baldness, although it may unmask a genetic tendency to genetic balding i.e. female pattern hair loss, or in men, male pattern hair loss.    What is the treatment for telogen effluvium?  Telogen effluvium is self-correcting. It is really not influenced by any treatment that can be given. However, gentle handling of the hair, avoiding over-vigorous combing, brushing and any type of scalp massage are important.    You should also ensure a nutritious diet, with plenty of protein, fruit and vegetables.    The doctor may check your thyroid function, and levels of iron, vitamin B12 and folic acid, as any deficiency in these can slow hair growth. The psychological effects of hair loss should not be ignored.

## 2024-08-22 ENCOUNTER — OFFICE VISIT (OUTPATIENT)
Dept: INTERNAL MEDICINE CLINIC | Facility: CLINIC | Age: 69
End: 2024-08-22
Payer: MEDICARE

## 2024-08-22 VITALS
HEIGHT: 68 IN | OXYGEN SATURATION: 98 % | TEMPERATURE: 96.6 F | DIASTOLIC BLOOD PRESSURE: 92 MMHG | HEART RATE: 100 BPM | SYSTOLIC BLOOD PRESSURE: 160 MMHG | BODY MASS INDEX: 31.37 KG/M2 | WEIGHT: 207 LBS

## 2024-08-22 DIAGNOSIS — R60.0 SWELLING OF SALIVARY GLAND: Primary | ICD-10-CM

## 2024-08-22 DIAGNOSIS — D58.2 ELEVATED HEMOGLOBIN (HCC): ICD-10-CM

## 2024-08-22 DIAGNOSIS — I10 ESSENTIAL HYPERTENSION: ICD-10-CM

## 2024-08-22 DIAGNOSIS — T78.40XS ALLERGY, SEQUELA: ICD-10-CM

## 2024-08-22 PROCEDURE — G2211 COMPLEX E/M VISIT ADD ON: HCPCS | Performed by: INTERNAL MEDICINE

## 2024-08-22 PROCEDURE — 99213 OFFICE O/P EST LOW 20 MIN: CPT | Performed by: INTERNAL MEDICINE

## 2024-08-22 RX ORDER — EPINEPHRINE 0.3 MG/.3ML
0.3 INJECTION SUBCUTANEOUS ONCE
Qty: 0.6 ML | Refills: 0 | Status: SHIPPED | OUTPATIENT
Start: 2024-08-22 | End: 2024-08-22

## 2024-08-22 NOTE — PROGRESS NOTES
Ambulatory Visit  Name: Dora Warner      : 1955      MRN: [id number]  Encounter Provider: Flower Beasley MD  Encounter Date: 2024   Encounter department: [facility] INTERNAL MEDICINE    Assessment & Plan   1. Swelling of salivary gland  Comments:  Bilateral, no URI symptoms. ?start of viral infection. Instructed to monitor. May apply warm compress prn, may try sour candy/beverages.  2. Allergy, sequela  -     EPINEPHrine (EPIPEN) 0.3 mg/0.3 mL SOAJ; Inject 0.3 mL (0.3 mg total) into a muscle once for 1 dose  3. Elevated hemoglobin (HCC)  4. Essential hypertension  Assessment & Plan:  BP elevated today.  May take additional 1/2 tablet later today if still elevated.    Discussed imaging if no improvement.      History of Present Illness     She was drinking water last night when she noticed her left cheek area was slightly swollen.  She then noticed her right cheek was also swollen, by the jawline.  She reports minimal discomfort.  This happened about 1030 last night, she was very worried and wanted to go to the ER.  She otherwise feels well.  Denies any recent cough or cold symptoms.  Denies any sore throat, nasal congestion, fevers.  She reports no neck pain, no chest pain or shortness of breath.  No difficulty swallowing, reflux symptoms.  She recalls having measles as a child.    She has been checking her blood pressure regularly.  It was slightly elevated yesterday.  It was elevated again this morning, she has been taking her blood pressure pill every morning as instructed. She complains of feeling dry since starting the medication.      Review of Systems   Constitutional:  Negative for fever.   HENT:  Negative for congestion, ear pain, postnasal drip, rhinorrhea, sinus pressure, sinus pain and sore throat.    Respiratory:  Negative for cough.    Neurological:  Negative for dizziness and headaches.       Objective     /92   Pulse 100   Temp (!) 96.6 °F (35.9 °C)   Ht 5'  "8\" (1.727 m)   Wt 93.9 kg (207 lb)   SpO2 98%   BMI 31.47 kg/m²     Physical Exam  Constitutional:       General: She is not in acute distress.     Appearance: Normal appearance. She is not ill-appearing.   HENT:      Head: Normocephalic and atraumatic.      Jaw: Swelling present. No tenderness or pain on movement.        Right Ear: External ear normal.      Left Ear: External ear normal.      Nose: Nose normal. No congestion or rhinorrhea.      Mouth/Throat:      Mouth: Mucous membranes are moist.      Pharynx: No oropharyngeal exudate or posterior oropharyngeal erythema.   Eyes:      Pupils: Pupils are equal, round, and reactive to light.   Cardiovascular:      Rate and Rhythm: Normal rate and regular rhythm.   Pulmonary:      Effort: Pulmonary effort is normal.      Breath sounds: Normal breath sounds.   Lymphadenopathy:      Head:      Right side of head: No submental, submandibular or preauricular adenopathy.      Left side of head: No submental, submandibular or preauricular adenopathy.      Cervical: No cervical adenopathy.   Neurological:      Mental Status: She is alert.   Psychiatric:         Mood and Affect: Mood is anxious.       Administrative Statements     "

## 2024-08-22 NOTE — PATIENT INSTRUCTIONS
Apply warm compress 10 to 15 minutes at a time.    May take ibuprofen 400 mg 3 times a day with food for 3 days ONLY.    May try sour candy, lemonade etc.    Take additional BP pill this afternoon.

## 2024-08-29 ENCOUNTER — TELEPHONE (OUTPATIENT)
Age: 69
End: 2024-08-29

## 2024-08-29 DIAGNOSIS — R60.0 SWELLING OF SALIVARY GLAND: Primary | ICD-10-CM

## 2024-08-29 NOTE — TELEPHONE ENCOUNTER
Please call patient.  Ask if she developed any cough or cold symptoms, any fevers?  Any pain?    Ultrasound ordered, can call and schedule.    Also, ask if BP readings are now back to normal.

## 2024-08-29 NOTE — TELEPHONE ENCOUNTER
Patient called states is still having the swelling of both sides of face. As of right now Left side is more swollen. Would like a call back when testing is ordered or if there is further instruction

## 2024-08-30 ENCOUNTER — HOSPITAL ENCOUNTER (OUTPATIENT)
Dept: ULTRASOUND IMAGING | Facility: HOSPITAL | Age: 69
End: 2024-08-30
Payer: MEDICARE

## 2024-08-30 DIAGNOSIS — R60.0 SWELLING OF SALIVARY GLAND: ICD-10-CM

## 2024-08-30 PROCEDURE — 76536 US EXAM OF HEAD AND NECK: CPT

## 2024-09-01 DIAGNOSIS — I10 ESSENTIAL HYPERTENSION: ICD-10-CM

## 2024-09-02 RX ORDER — LISINOPRIL AND HYDROCHLOROTHIAZIDE 12.5; 2 MG/1; MG/1
1 TABLET ORAL DAILY
Qty: 90 TABLET | Refills: 1 | Status: SHIPPED | OUTPATIENT
Start: 2024-09-02

## 2024-09-04 NOTE — TELEPHONE ENCOUNTER
Ultrasound was normal, no mass or enlarged lymph node seen.  If still bothersome, recommend to see ENT. I can send referral.

## 2024-09-20 ENCOUNTER — OFFICE VISIT (OUTPATIENT)
Dept: GASTROENTEROLOGY | Facility: CLINIC | Age: 69
End: 2024-09-20
Payer: MEDICARE

## 2024-09-20 VITALS
HEART RATE: 94 BPM | BODY MASS INDEX: 31.52 KG/M2 | WEIGHT: 208 LBS | DIASTOLIC BLOOD PRESSURE: 91 MMHG | SYSTOLIC BLOOD PRESSURE: 147 MMHG | HEIGHT: 68 IN

## 2024-09-20 DIAGNOSIS — Z90.49 S/P CHOLECYSTECTOMY: ICD-10-CM

## 2024-09-20 DIAGNOSIS — R10.10 PAIN OF UPPER ABDOMEN: ICD-10-CM

## 2024-09-20 DIAGNOSIS — K76.0 FATTY LIVER: ICD-10-CM

## 2024-09-20 DIAGNOSIS — K21.00 GASTROESOPHAGEAL REFLUX DISEASE WITH ESOPHAGITIS WITHOUT HEMORRHAGE: ICD-10-CM

## 2024-09-20 DIAGNOSIS — R10.11 RIGHT UPPER QUADRANT ABDOMINAL PAIN: Primary | ICD-10-CM

## 2024-09-20 DIAGNOSIS — R15.1 FECAL SMEARING: ICD-10-CM

## 2024-09-20 DIAGNOSIS — R14.0 BLOATING: ICD-10-CM

## 2024-09-20 DIAGNOSIS — K57.90 DIVERTICULOSIS: ICD-10-CM

## 2024-09-20 PROCEDURE — 99214 OFFICE O/P EST MOD 30 MIN: CPT | Performed by: INTERNAL MEDICINE

## 2024-09-20 NOTE — PROGRESS NOTES
[facility] [place] - Outpatient Follow-up Note  Dora Warner 69 y.o. female MRN: [id number]  Encounter: [id number]          ASSESSMENT AND PLAN:      1. Right upper quadrant abdominal pain  2. Pain of upper abdomen  3. Bloating  4. Gastroesophageal reflux disease with esophagitis without hemorrhage  5. Diverticulosis  6. S/P cholecystectomy  7. Fatty liver  8. Fecal smearing    While discomfort upper abdomen sometime in the right upper quadrant sometime in the left upper quadrant ultrasound of the abdomen were generally unremarkable, history of fatty liver.  Clinically no evidence of acute abdomen is obstruction.  Some history of reflux.  Antireflux measure reviewed diet discussed, advised her to lose some weight which may help her fatty liver as well.  Stay of fecal smearing, try Metamucil powder to manage the bowels better.  Prior EGD colonoscopy results noted.  History of Dahl's in the family.  She will be due for 5-year colonoscopy next year in fall and the EGD at the same time.    ______________________________________________________________________    SUBJECTIVE:      Patient came in to discuss her multiple GI issues, she has some discomfort in the right upper quadrant some bloating fullness appetite is fair weight is stable, she has noted occasional regurgitation once had some stuff, up through the nose, she was laying down.  Denies any dysphagia coughing choking spells denies any blood in stools melena hematochezia, bowels are irregular.  Occasional smearing of the stool.  She does have some urinary incontinence.  No chest pain shortness of breath fever chills rash somewhat anxious.  Diet medication more than 10 systems reviewed.      REVIEW OF SYSTEMS IS OTHERWISE NEGATIVE.      Historical Information   Past Medical History:   Diagnosis Date    Allergic     Asthma     Cough/Allergy Induced    Dahl esophagus     Dahl's esophagus     Basal cell carcinoma 07/08/2021    Left  nasal ala crease    Closed left ankle fracture     chip    Diverticulitis     Fibromyalgia, primary     GERD (gastroesophageal reflux disease)     Hypertension     Otitis media     Scoliosis 1985    Urinary tract infection     Visual impairment May 2021    Retina specialist seen     Past Surgical History:   Procedure Laterality Date    BAND HEMORRHOIDECTOMY       SECTION  1986     SECTION  1989    CHOLECYSTECTOMY  Dec. 2021    COLONOSCOPY      EGD      ELBOW SURGERY Left     cyst removal    MOHS SURGERY Left 09/15/2021    RADHA-Dr.senft AUSTIN LAPAROSCOPY SURG CHOLECYSTECTOMY N/A 2021    Procedure: LAPAROSCOPIC CHOLECYSTECTOMY;  Surgeon: Riccardo Mcdonald MD;  Location: [facility];  Service: General    SKIN BIOPSY      UPPER GASTROINTESTINAL ENDOSCOPY       Social History   Social History     Substance and Sexual Activity   Alcohol Use Never    Comment: Rare Socially     Social History     Substance and Sexual Activity   Drug Use Never     Social History     Tobacco Use   Smoking Status Never   Smokeless Tobacco Never     Family History   Problem Relation Age of Onset    Diabetes Mother 70    Hypertension Mother     Heart disease Mother     Hyperlipidemia Mother     Prostate cancer Father     Diabetes Father 70    Hypertension Father     Heart disease Father 60    Stroke Father     Hyperlipidemia Father     Melanoma Sister     Hypertension Sister     Hyperlipidemia Sister     Asthma Daughter     Heart disease Maternal Grandmother     Colon cancer Maternal Grandfather 80    Asthma Maternal Grandfather     Asthma Paternal Grandmother     No Known Problems Paternal Grandfather     Hyperlipidemia Brother     Melanoma Brother     Asthma Brother     Lung disease Brother     Dahl's esophagus Brother     Hypertension Brother     Cancer Brother 68        Oral pharyngial    Dahl's esophagus Brother     Heart disease Brother         A-Fib    Hyperlipidemia Brother     Dahl's esophagus  "Brother     Hypertension Brother     Heart disease Brother         Heart attack in 40s. 1 stent    No Known Problems Son     No Known Problems Maternal Aunt     No Known Problems Maternal Aunt     No Known Problems Maternal Aunt     Alcohol abuse Neg Hx     Substance Abuse Neg Hx     Mental illness Neg Hx     Depression Neg Hx        Meds/Allergies       Current Outpatient Medications:     aspirin 81 mg chewable tablet    Cholecalciferol (Vitamin D3) 50 MCG (2000 UT) TABS    lisinopril-hydrochlorothiazide (PRINZIDE,ZESTORETIC) 20-12.5 MG per tablet    Multiple Vitamins-Minerals (CENTRUM ADULTS PO)    omeprazole (PriLOSEC) 20 mg delayed release capsule    atorvastatin (LIPITOR) 20 mg tablet    EPINEPHrine (EPIPEN) 0.3 mg/0.3 mL SOAJ    Menaquinone-7 (Vitamin K2) 100 MCG CAPS    Allergies   Allergen Reactions    Drug [Diclofenac Sodium] Tongue Swelling    Garlic - Food Allergy Throat Swelling    Latex Anaphylaxis     Latex and Black Rubber    Meperidine Tongue Swelling    Nsaids GI Bleeding     Rectal bleeding    Other Throat Swelling     \"Perfumes\"    Percocet [Oxycodone-Acetaminophen] Itching     Norco ok     Formaldehyde Other (See Comments)     + Skin Test    Levaquin [Levofloxacin] Other (See Comments)     Achilles tendon pain    Adhesive [Medical Tape] Rash    Penicillins Rash    Sulfa Antibiotics Rash           Objective     Blood pressure 147/91, pulse 94, height 5' 8\" (1.727 m), weight 94.3 kg (208 lb). Body mass index is 31.63 kg/m².      PHYSICAL EXAM:      General Appearance:   Alert, cooperative, no distress   HEENT:   Normocephalic, atraumatic, anicteric.     Neck:  Supple, symmetrical, trachea midline   Lungs:   Clear to auscultation bilaterally; no rales, rhonchi or wheezing; respirations unlabored    Heart::   Regular rate and rhythm; no murmur.   Abdomen:   Soft, non-tender, non-distended; normal bowel sounds; no masses, no organomegaly    Genitalia:   Deferred    Rectal:   Deferred    Extremities:  " No cyanosis, clubbing or edema    Skin:  No jaundice, rashes, or lesions    Lymph nodes:  No palpable cervical lymphadenopathy        Lab Results:   No visits with results within 1 Day(s) from this visit.   Latest known visit with results is:   Office Visit on 05/29/2024   Component Date Value    H pylori Ag, Stl 05/30/2024 Negative          Radiology Results:   US head neck soft tissue    Result Date: 9/4/2024  Narrative: HEAD AND NECK SOFT TISSUE ULTRASOUND INDICATION: R60.0: Localized edema. Bilateral parotid gland swelling for the last week. COMPARISON: None TECHNIQUE: Real-time ultrasound of the bilateral parotid and submandibular glands was performed with a linear transducer with both volumetric sweeps and still imaging techniques. FINDINGS: No abnormality identified throughout the imaged region of concern. Sonographically normal-appearing bilateral parotid and submandibular glands without suspicious mass or hyperemia. No obvious collection. No obvious ductal dilatation of the intraparotid ducts by ultrasound.     Impression: Normal. Workstation performed: [id number]   Answers submitted by the patient for this visit:  Abdominal Pain Questionnaire (Submitted on 9/16/2024)  Chief Complaint: Abdominal pain  Chronicity: recurrent  Onset: more than 1 month ago  Onset quality: gradual  Frequency: intermittently  Progression since onset: unchanged  Pain location: RUQ  Pain - numeric: 5/10  Pain quality: aching, a sensation of fullness  Radiates to: does not radiate  arthralgias: Yes  belching: Yes  diarrhea: Yes  dysuria: No  fever: No  flatus: Yes  headaches: No  hematochezia: No  hematuria: No  melena: No  myalgias: Yes  nausea: No  weight loss: No  vomiting: No  Diagnostic workup: ultrasound

## 2024-10-07 ENCOUNTER — CONSULT (OUTPATIENT)
Dept: OTOLARYNGOLOGY | Facility: CLINIC | Age: 69
End: 2024-10-07
Payer: MEDICARE

## 2024-10-07 ENCOUNTER — HOSPITAL ENCOUNTER (OUTPATIENT)
Dept: RADIOLOGY | Age: 69
Discharge: HOME | End: 2024-10-07
Payer: MEDICARE

## 2024-10-07 VITALS
HEART RATE: 93 BPM | OXYGEN SATURATION: 98 % | WEIGHT: 208 LBS | HEIGHT: 68 IN | TEMPERATURE: 97.9 F | BODY MASS INDEX: 31.52 KG/M2

## 2024-10-07 VITALS — BODY MASS INDEX: 31.52 KG/M2 | WEIGHT: 208 LBS | HEIGHT: 68 IN

## 2024-10-07 DIAGNOSIS — R60.0 SWELLING OF SALIVARY GLAND: ICD-10-CM

## 2024-10-07 DIAGNOSIS — J30.0 VASOMOTOR RHINITIS: Primary | ICD-10-CM

## 2024-10-07 DIAGNOSIS — R09.82 PND (POST-NASAL DRIP): ICD-10-CM

## 2024-10-07 DIAGNOSIS — Z12.31 VISIT FOR SCREENING MAMMOGRAM: ICD-10-CM

## 2024-10-07 PROCEDURE — 31575 DIAGNOSTIC LARYNGOSCOPY: CPT | Performed by: STUDENT IN AN ORGANIZED HEALTH CARE EDUCATION/TRAINING PROGRAM

## 2024-10-07 PROCEDURE — 99204 OFFICE O/P NEW MOD 45 MIN: CPT | Performed by: STUDENT IN AN ORGANIZED HEALTH CARE EDUCATION/TRAINING PROGRAM

## 2024-10-07 PROCEDURE — 77067 SCR MAMMO BI INCL CAD: CPT

## 2024-10-07 PROCEDURE — 77063 BREAST TOMOSYNTHESIS BI: CPT

## 2024-10-07 RX ORDER — IPRATROPIUM BROMIDE 21 UG/1
2 SPRAY, METERED NASAL EVERY 12 HOURS
Qty: 30 ML | Refills: 3 | Status: SHIPPED | OUTPATIENT
Start: 2024-10-07

## 2024-10-07 NOTE — PROGRESS NOTES
[facility]  [facility]  [facility]  Otolaryngology -- Head and Neck Surgery New Patient Visit  Dora Warner is a 69 y.o. who presents with a chief complaint of bilateral parotid swelling that resolved/subsided on its own. She was instructed to drink fluids and have sialogogues. This has only happened that one time in August before.     Otherwise she has intermittent sharp ear pain on the left side.     She also reports she has constant post nasal drip, mucus in her throat, and phlegm with throat clearing. She has a history of Dahl's esophagus on prilosec. Not on any nasal sprays.        Review of systems: Pertinent review of systems documented in the HPI. 10 point ROS documented in a separate note, as necessary.  Results reviewed; images from any scan have been personally reviewed:        The past medical, surgical, social and family history have been reviewed as documented in today's record.  Past Medical History:   Diagnosis Date    Allergic     Asthma     Cough/Allergy Induced    Dahl esophagus     Dahl's esophagus     Basal cell carcinoma 2021    Left nasal ala crease    Closed left ankle fracture     chip    Diverticulitis     Ear problems     Fibromyalgia, primary     GERD (gastroesophageal reflux disease)     HL (hearing loss)     Temporary loss left ear late in 2 pregnancies    Hypertension     Otitis media     Scoliosis     Urinary tract infection     Visual impairment May 2021    Retina specialist seen     Past Surgical History:   Procedure Laterality Date    BAND HEMORRHOIDECTOMY       SECTION  1986     SECTION  1989    CHOLECYSTECTOMY  Dec. 2021    COLONOSCOPY      EGD      ELBOW SURGERY Left     cyst removal    MOHS SURGERY Left 09/15/2021    RAD[facility]-Dr.senft AUSTIN LAPAROSCOPY SURG CHOLECYSTECTOMY N/A 2021    Procedure: LAPAROSCOPIC CHOLECYSTECTOMY;  Surgeon: Riccardo Mcdonald MD;  Location: [facility];   Service: General    SKIN BIOPSY      UPPER GASTROINTESTINAL ENDOSCOPY       Family History   Problem Relation Age of Onset    Diabetes Mother 70    Hypertension Mother     Heart disease Mother     Hyperlipidemia Mother     Prostate cancer Father     Diabetes Father 70    Hypertension Father     Heart disease Father 60    Stroke Father     Hyperlipidemia Father     Melanoma Sister     Hypertension Sister     Hyperlipidemia Sister     Asthma Daughter     Heart disease Maternal Grandmother     Colon cancer Maternal Grandfather 80    Asthma Maternal Grandfather     Asthma Paternal Grandmother     No Known Problems Paternal Grandfather     Hyperlipidemia Brother     Melanoma Brother     Asthma Brother     Lung disease Brother     Dahl's esophagus Brother     Hypertension Brother     Cancer Brother 68        Oral pharyngial    Dahl's esophagus Brother     Heart disease Brother         A-Fib    Hyperlipidemia Brother     Dahl's esophagus Brother     Hypertension Brother     Heart disease Brother         Heart attack in 40s. 1 stent    No Known Problems Son     No Known Problems Maternal Aunt     No Known Problems Maternal Aunt     No Known Problems Maternal Aunt     Alcohol abuse Neg Hx     Substance Abuse Neg Hx     Mental illness Neg Hx     Depression Neg Hx      Current Outpatient Medications on File Prior to Visit   Medication Sig Dispense Refill    aspirin 81 mg chewable tablet Chew      Cholecalciferol (Vitamin D3) 50 MCG (2000 UT) TABS Take by mouth 2 daily      lisinopril-hydrochlorothiazide (PRINZIDE,ZESTORETIC) 20-12.5 MG per tablet Take 1 tablet by mouth once daily 90 tablet 1    Multiple Vitamins-Minerals (CENTRUM ADULTS PO) Take by mouth      omeprazole (PriLOSEC) 20 mg delayed release capsule Take 20 mg by mouth daily in the early morning        atorvastatin (LIPITOR) 20 mg tablet Take 1 tablet (20 mg total) by mouth daily (Patient not taking: Reported on 9/6/2023) 30 tablet 0    EPINEPHrine (EPIPEN)  "0.3 mg/0.3 mL SOAJ Inject 0.3 mL (0.3 mg total) into a muscle once for 1 dose 0.6 mL 0    Menaquinone-7 (Vitamin K2) 100 MCG CAPS Take 100 mcg by mouth daily (Patient not taking: Reported on 7/9/2024)       No current facility-administered medications on file prior to visit.      Physical exam:   Pulse 93   Temp 97.9 °F (36.6 °C) (Temporal)   Ht 5' 8\" (1.727 m)   Wt 94.3 kg (208 lb)   SpO2 98%   BMI 31.63 kg/m²   Head: Atraumatic, no visible scalp lesions, parotid and submandibular salivary glands non-tender to palpation and without masses bilaterally.   Neck:  No visible or palpable cervical lesions or lymphadenopathy, thyroid gland is normal in size and symmetry and without masses, normal laryngeal elevation with swallowing.   Ears:    Right ear :  Auricle normal in appearance, mastoid prominence non-tender, external auditory canal clear. Tympanic membranes intact.   Left ear :  Auricle normal in appearance, mastoid prominence non-tender, external auditory canal clear . Tympanic membranes intact.   Nose/Sinuses:  External appearance unremarkable, no maxillary or frontal sinus tenderness to palpation bilaterally. Anterior rhinoscopy reveals:   Oral Cavity:  Moist mucus membranes, gums and dentition unremarkable, no oral mucosal masses or lesions, floor of mouth soft, tongue mobile without masses or lesions.   Oropharynx:  Base of tongue soft and without masses, tonsils bilaterally unremarkable, soft palate mucosa unremarkable.      Eyes:  Extra-ocular movements intact, pupils equally round and reactive to light and accommodation, the lids and conjunctivae are normal in appearance.  Constitutional:  Well developed, well nourished and groomed, in no acute distress.   Cardiovascular:  Normal rate and rhythm, no palpable thrills, no jugulovenous distension observed.  Respiratory:  Normal respiratory effort without evidence of retractions or use of accessory muscles.  Neurologic:  Cranial nerves II-XII intact " bilaterally.  Abdomen: Soft and lax  Extremities: No bruises   Psychiatric:  Alert and oriented to time, place and person.  Procedures  Flexible laryngoscopy performed:  The nasal cavities were decongested with lidocaine and oxymetazoline spray.   Endoscopy type: flexible  Results:   Fiberoptic scope advanced through left nare, findings:  Nasal cavity: Septum deviated, no polyps or mucopus.  Nasopharynx: unremarkable, no masses or lesions, eustachian tube orifi and Fossae of Rosenmuller unremakable.  Oropharynx: mucosa moist, no masses or lesions, tongue base, lateral and posterior walls normal  Larynx: True vocal folds mobile, no masses or lesions, widely patent glottic chink, arytenoids erythema and evidence of PND.  Hypopharynx: Pyriform sinuses clear without masses or pooling.  Post-cricoid area unremarkable.   The patient tolerated the procedure well.   Assessment:   1. Vasomotor rhinitis  ipratropium (ATROVENT) 0.03 % nasal spray      2. Swelling of salivary gland  Ambulatory Referral to Otolaryngology      3. PND (post-nasal drip)          Orders  No orders of the defined types were placed in this encounter.    Discussion/Plan:   No active swelling in her parotid glands currently. Advised her on treatment of salivary gland massage, fluids, and sialogogues when the swelling does occur. If this continues to occur, we can consider CT scan of neck to evaluate for salivary sialoliths.     For her post nasal drip, recommend continuing on anti-reflux medication. Daily Neilmed saline rise and atrovent nasal spray for vasomotor rhinitis. Follow up in 6 months.

## 2024-11-01 ENCOUNTER — HOSPITAL ENCOUNTER (OUTPATIENT)
Dept: RADIOLOGY | Facility: HOSPITAL | Age: 69
Discharge: HOME | End: 2024-11-01
Payer: MEDICARE

## 2024-11-01 ENCOUNTER — OFFICE VISIT (OUTPATIENT)
Dept: INTERNAL MEDICINE CLINIC | Facility: CLINIC | Age: 69
End: 2024-11-01
Payer: MEDICARE

## 2024-11-01 VITALS
SYSTOLIC BLOOD PRESSURE: 138 MMHG | DIASTOLIC BLOOD PRESSURE: 84 MMHG | BODY MASS INDEX: 31.22 KG/M2 | HEIGHT: 68 IN | TEMPERATURE: 97.2 F | WEIGHT: 206 LBS | HEART RATE: 82 BPM | OXYGEN SATURATION: 95 %

## 2024-11-01 DIAGNOSIS — M81.0 AGE-RELATED OSTEOPOROSIS WITHOUT CURRENT PATHOLOGICAL FRACTURE: ICD-10-CM

## 2024-11-01 DIAGNOSIS — R07.81 RIB PAIN ON LEFT SIDE: Primary | ICD-10-CM

## 2024-11-01 DIAGNOSIS — R07.81 RIB PAIN ON LEFT SIDE: ICD-10-CM

## 2024-11-01 PROCEDURE — 99213 OFFICE O/P EST LOW 20 MIN: CPT | Performed by: NURSE PRACTITIONER

## 2024-11-01 PROCEDURE — 71101 X-RAY EXAM UNILAT RIBS/CHEST: CPT

## 2024-11-01 PROCEDURE — G2211 COMPLEX E/M VISIT ADD ON: HCPCS | Performed by: NURSE PRACTITIONER

## 2024-11-01 NOTE — PROGRESS NOTES
Ambulatory Visit  Name: Dora Warner      : 1955      MRN: [id number]  Encounter Provider: YUNIEL Gama  Encounter Date: 2024   Encounter department: [facility] INTERNAL MEDICINE    Assessment & Plan  Rib pain on left side  Apply a lidocaine patch to area 12 hours on 12 hours off.   Ok to take tylenol as needed.   Recommend deep breathing.   Check xray, rule out rib fracture.   Orders:    XR ribs left w pa chest min 3 views; Future    Age-related osteoporosis without current pathological fracture            History of Present Illness     Dora is here today with complaints of left rib pain  Symptoms started about 5 days ago  She leaned forward and felt a pop in her left rib area.   Pain is constant, worse when she is laying on it or taking a deep breath.   She denies any rash or shortness of breath.     Abdominal Pain  This is a new problem. The current episode started in the past 7 days. The onset quality is sudden. The problem occurs constantly. The problem has been waxing and waning. The pain is located in the LUQ. The pain is at a severity of 8/10. The quality of the pain is sharp. The abdominal pain radiates to the left flank. Associated symptoms include arthralgias. Pertinent negatives include no anorexia, belching, constipation, diarrhea, dysuria, fever, flatus, frequency, headaches, hematochezia, hematuria, melena, nausea, vomiting or weight loss. The pain is aggravated by certain positions, coughing, deep breathing and movement. The pain is relieved by Nothing. Prior diagnostic workup includes GI consult and ultrasound.         Review of Systems   Constitutional:  Negative for fever and weight loss.   Respiratory:  Negative for shortness of breath.    Cardiovascular:  Negative for chest pain.   Gastrointestinal:  Negative for abdominal pain, anorexia, constipation, diarrhea, flatus, hematochezia, melena, nausea and vomiting.   Genitourinary:  Negative for dysuria,  "frequency and hematuria.   Musculoskeletal:  Positive for arthralgias.   Skin:  Negative for rash.   Neurological:  Negative for headaches.           Objective     /84   Pulse 82   Temp (!) 97.2 °F (36.2 °C)   Ht 5' 8\" (1.727 m)   Wt 93.4 kg (206 lb)   SpO2 95%   BMI 31.32 kg/m²     Physical Exam  Vitals reviewed.   Constitutional:       Appearance: Normal appearance.   HENT:      Head: Normocephalic and atraumatic.   Eyes:      Conjunctiva/sclera: Conjunctivae normal.   Cardiovascular:      Rate and Rhythm: Normal rate and regular rhythm.      Heart sounds: Normal heart sounds.   Pulmonary:      Effort: Pulmonary effort is normal.      Breath sounds: Normal breath sounds.   Chest:       Neurological:      Mental Status: She is alert and oriented to person, place, and time.   Psychiatric:         Mood and Affect: Mood normal.         Behavior: Behavior normal.         "

## 2024-11-12 ENCOUNTER — OFFICE VISIT (OUTPATIENT)
Dept: INTERNAL MEDICINE CLINIC | Facility: CLINIC | Age: 69
End: 2024-11-12
Payer: MEDICARE

## 2024-11-12 VITALS
BODY MASS INDEX: 32.33 KG/M2 | HEART RATE: 98 BPM | SYSTOLIC BLOOD PRESSURE: 118 MMHG | WEIGHT: 206 LBS | OXYGEN SATURATION: 95 % | DIASTOLIC BLOOD PRESSURE: 78 MMHG | RESPIRATION RATE: 16 BRPM | TEMPERATURE: 97.1 F | HEIGHT: 67 IN

## 2024-11-12 DIAGNOSIS — R60.0 SWELLING OF SALIVARY GLAND: Primary | ICD-10-CM

## 2024-11-12 DIAGNOSIS — R07.81 RIB PAIN ON LEFT SIDE: ICD-10-CM

## 2024-11-12 DIAGNOSIS — M81.0 AGE-RELATED OSTEOPOROSIS WITHOUT CURRENT PATHOLOGICAL FRACTURE: ICD-10-CM

## 2024-11-12 DIAGNOSIS — E78.2 MIXED HYPERLIPIDEMIA: ICD-10-CM

## 2024-11-12 DIAGNOSIS — M76.62 ACHILLES TENDINITIS OF BOTH LOWER EXTREMITIES: ICD-10-CM

## 2024-11-12 DIAGNOSIS — M54.16 LUMBAR RADICULOPATHY: ICD-10-CM

## 2024-11-12 DIAGNOSIS — N18.2 CHRONIC KIDNEY DISEASE (CKD), STAGE II (MILD): ICD-10-CM

## 2024-11-12 DIAGNOSIS — Z00.00 MEDICARE ANNUAL WELLNESS VISIT, SUBSEQUENT: ICD-10-CM

## 2024-11-12 DIAGNOSIS — R73.03 PREDIABETES: ICD-10-CM

## 2024-11-12 DIAGNOSIS — M79.7 FIBROMYALGIA: ICD-10-CM

## 2024-11-12 DIAGNOSIS — Z23 ENCOUNTER FOR IMMUNIZATION: ICD-10-CM

## 2024-11-12 DIAGNOSIS — M76.61 ACHILLES TENDINITIS OF BOTH LOWER EXTREMITIES: ICD-10-CM

## 2024-11-12 DIAGNOSIS — F41.1 GENERALIZED ANXIETY DISORDER: ICD-10-CM

## 2024-11-12 DIAGNOSIS — I10 ESSENTIAL HYPERTENSION: ICD-10-CM

## 2024-11-12 DIAGNOSIS — K22.70 BARRETT'S ESOPHAGUS WITHOUT DYSPLASIA: ICD-10-CM

## 2024-11-12 PROCEDURE — 90662 IIV NO PRSV INCREASED AG IM: CPT | Performed by: INTERNAL MEDICINE

## 2024-11-12 PROCEDURE — 99214 OFFICE O/P EST MOD 30 MIN: CPT | Performed by: INTERNAL MEDICINE

## 2024-11-12 PROCEDURE — G0008 ADMIN INFLUENZA VIRUS VAC: HCPCS | Performed by: INTERNAL MEDICINE

## 2024-11-12 PROCEDURE — G0439 PPPS, SUBSEQ VISIT: HCPCS | Performed by: INTERNAL MEDICINE

## 2024-11-12 NOTE — ASSESSMENT & PLAN NOTE
Recommend treatment. Taking D3 daily, eating dairy daily.  Agrees to repeat bone density.    Orders:    DXA bone density spine hip and pelvis; Future

## 2024-11-12 NOTE — PROGRESS NOTES
Ambulatory Visit  Name: Dora Warner      : 1955      MRN: [id number]  Encounter Provider: Flower Beasley MD  Encounter Date: 2024   Encounter department: [facility] INTERNAL MEDICINE    Assessment & Plan  Swelling of salivary gland  Resolved. Saw ENT, instructed to massage, eat sour foods if it recurs.       Rib pain on left side  Mostly resolved.       Essential hypertension  BP now well controlled. Taking lisinopril-HCTZ 20-12.5 mg daily.    Orders:    CBC and differential; Future    Chronic kidney disease (CKD), stage II (mild)  Lab Results   Component Value Date    EGFR 61 2024    EGFR 76 2024    EGFR 81 2024    CREATININE 0.96 2024    CREATININE 0.80 2024    CREATININE 0.76 2024     Repeat labs.  Avoid NSAIDs.    Orders:    Comprehensive metabolic panel; Future    Mixed hyperlipidemia  Did not take atorvastatin. Discussed risk.    Orders:    Lipid panel; Future    Dahl's esophagus without dysplasia  Taking PPI daily.  Repeat EGD due next year, saw GI recently.         Age-related osteoporosis without current pathological fracture  Recommend treatment. Taking D3 daily, eating dairy daily.  Agrees to repeat bone density.    Orders:    DXA bone density spine hip and pelvis; Future    Prediabetes  Repeat A1c.    Orders:    Hemoglobin A1C; Future    Generalized anxiety disorder  Ongoing, no change.         Fibromyalgia  Stable.         Lumbar radiculopathy  Stable.  Recommend balance exercises, start at home.         Achilles tendinitis of both lower extremities  Ongoing, intermittent pain.         Medicare annual wellness visit, subsequent         Encounter for immunization    Orders:    influenza vaccine, high-dose, PF 0.5 mL (Fluzone High Dose)     Follow up in 6 months or as needed.    Preventive health issues were discussed with patient, and age appropriate screening tests were ordered as noted in patient's After Visit Summary.  Personalized health advice and appropriate referrals for health education or preventive services given if needed, as noted in patient's After Visit Summary.    History of Present Illness     She reports cheek swelling has resolved. She did see ENT, saw them and the swelling at that time was not as bad. She was advised to apply heat and massage the area if it recurs. She reports a pool of blood on her pillow 2 days after the visit, since she had a scope up her nose. It did not recur, did not tell anyone about it. She was upset about it, no pain.    She reports rib pain is getting better, not as bad anymore.  She does not take calcium since it bothers her stomach.  She feels her balance is not as good, noticed it while doing yard work. She is not interested in therapy or the gym.       Patient Care Team:  Flower Beasley MD as PCP - General (Internal Medicine)  Merna Mills MD    Review of Systems   Constitutional:  Negative for appetite change and fatigue.   HENT:  Negative for congestion, ear pain and postnasal drip.    Eyes:  Negative for visual disturbance.   Respiratory:  Negative for cough and shortness of breath.    Cardiovascular:  Negative for chest pain and leg swelling.   Gastrointestinal:  Negative for abdominal pain, constipation and diarrhea.   Genitourinary:  Negative for dysuria, frequency and urgency.   Musculoskeletal:  Negative for arthralgias and myalgias.   Skin:  Negative for rash and wound.   Neurological:  Negative for dizziness, numbness and headaches.   Hematological:  Does not bruise/bleed easily.   Psychiatric/Behavioral:  Negative for confusion. The patient is not nervous/anxious.      Medical History Reviewed by provider this encounter:       Annual Wellness Visit Questionnaire   Dora is here for her Subsequent Wellness visit. Last Medicare Wellness visit information reviewed, patient interviewed and updates made to the record today.      Health Risk Assessment:   Patient  rates overall health as good. Patient feels that their physical health rating is same. Patient is satisfied with their life. Eyesight was rated as same. Hearing was rated as same. Patient feels that their emotional and mental health rating is same. Patients states they are never, rarely angry. Patient states they are sometimes unusually tired/fatigued. Pain experienced in the last 7 days has been some. Patient's pain rating has been 7/10. Patient states that she has experienced no weight loss or gain in last 6 months.     Depression Screening:   PHQ-2 Score: 0      Fall Risk Screening:   In the past year, patient has experienced: no history of falling in past year      Urinary Incontinence Screening:   Patient has leaked urine accidently in the last six months.     Home Safety:  Patient does not have trouble with stairs inside or outside of their home. Patient has working smoke alarms and has working carbon monoxide detector. Home safety hazards include: none.     Nutrition:   Current diet is Regular, Low Saturated Fat and No Added Salt.     Medications:   Patient is currently taking over-the-counter supplements. OTC medications include: see medication list. Patient is able to manage medications.     Activities of Daily Living (ADLs)/Instrumental Activities of Daily Living (IADLs):   Walk and transfer into and out of bed and chair?: Yes  Dress and groom yourself?: Yes    Bathe or shower yourself?: Yes    Feed yourself? Yes  Do your laundry/housekeeping?: Yes  Manage your money, pay your bills and track your expenses?: Yes  Make your own meals?: Yes    Do your own shopping?: Yes    Previous Hospitalizations:   Any hospitalizations or ED visits within the last 12 months?: No      Advance Care Planning:   Living will: Yes    Durable POA for healthcare: Yes    Advanced directive: Yes    End of Life Decisions reviewed with patient: Yes      Cognitive Screening:   Provider or family/friend/caregiver concerned regarding  cognition?: No    PREVENTIVE SCREENINGS      Cardiovascular Screening:    General: History Lipid Disorder    Due for: Lipid Panel      Diabetes Screening:     General: Screening Current    Due for: Blood Glucose      Colorectal Cancer Screening:     General: Screening Current      Breast Cancer Screening:     General: Screening Current      Cervical Cancer Screening:    General: Screening Not Indicated      Osteoporosis Screening:    General: History Osteoporosis    Due for: DXA Appendicular      Abdominal Aortic Aneurysm (AAA) Screening:        General: Screening Not Indicated      Lung Cancer Screening:     General: Screening Not Indicated      Hepatitis C Screening:    General: Screening Current    Screening, Brief Intervention, and Referral to Treatment (SBIRT)    Screening  Typical number of drinks in a day: 0  Typical number of drinks in a week: 0  Interpretation: Low risk drinking behavior.    AUDIT-C Screenin) How often did you have a drink containing alcohol in the past year? never  2) How many drinks did you have on a typical day when you were drinking in the past year? 0  3) How often did you have 6 or more drinks on one occasion in the past year? never    AUDIT-C Score: 0  Interpretation: Score 0-2 (female): Negative screen for alcohol misuse    Single Item Drug Screening:  How often have you used an illegal drug (including marijuana) or a prescription medication for non-medical reasons in the past year? never    Single Item Drug Screen Score: 0  Interpretation: Negative screen for possible drug use disorder    Other Counseling Topics:   Regular weightbearing exercise and calcium and vitamin D intake.     Social Determinants of Health     Financial Resource Strain: Patient Declined (10/27/2023)    Overall Financial Resource Strain (CARDIA)     Difficulty of Paying Living Expenses: Patient declined   Food Insecurity: Patient Declined (2024)    Hunger Vital Sign     Worried About Running Out of  "Food in the Last Year: Patient declined     Ran Out of Food in the Last Year: Patient declined   Transportation Needs: Patient Declined (11/12/2024)    PRAPARE - Transportation     Lack of Transportation (Medical): Patient declined     Lack of Transportation (Non-Medical): Patient declined   Housing Stability: Patient Declined (11/12/2024)    Housing Stability Vital Sign     Unable to Pay for Housing in the Last Year: Patient declined     Homeless in the Last Year: Patient declined   Utilities: Patient Declined (11/12/2024)    [facility] Utilities     Threatened with loss of utilities: Patient declined     No results found.    Objective     /78 (BP Location: Right arm, Patient Position: Sitting, Cuff Size: Large)   Pulse 98   Temp (!) 97.1 °F (36.2 °C) (Temporal)   Resp 16   Ht 5' 7.18\" (1.706 m)   Wt 93.4 kg (206 lb)   SpO2 95%   BMI 32.09 kg/m²     Physical Exam  Vitals and nursing note reviewed.   Constitutional:       General: She is not in acute distress.     Appearance: She is well-developed.   HENT:      Head: Normocephalic and atraumatic.      Jaw: No swelling.      Mouth/Throat:      Mouth: Mucous membranes are moist.   Eyes:      Pupils: Pupils are equal, round, and reactive to light.   Cardiovascular:      Rate and Rhythm: Normal rate and regular rhythm.      Heart sounds: Normal heart sounds.   Pulmonary:      Effort: Pulmonary effort is normal.      Breath sounds: Normal breath sounds. No wheezing.   Abdominal:      General: Bowel sounds are normal.      Palpations: Abdomen is soft.   Musculoskeletal:         General: No swelling.      Right lower leg: No edema.      Left lower leg: No edema.   Skin:     General: Skin is warm.      Findings: No rash.   Neurological:      General: No focal deficit present.      Mental Status: She is alert and oriented to person, place, and time.   Psychiatric:         Mood and Affect: Mood and affect normal. Mood is not anxious or depressed.         Behavior: " Behavior normal.           Labs & imaging results reviewed with patient.

## 2024-11-12 NOTE — PATIENT INSTRUCTIONS
Medicare Preventive Visit Patient Instructions  Thank you for completing your Welcome to Medicare Visit or Medicare Annual Wellness Visit today. Your next wellness visit will be due in one year (11/13/2025).  The screening/preventive services that you may require over the next 5-10 years are detailed below. Some tests may not apply to you based off risk factors and/or age. Screening tests ordered at today's visit but not completed yet may show as past due. Also, please note that scanned in results may not display below.  Preventive Screenings:  Service Recommendations Previous Testing/Comments   Colorectal Cancer Screening  * Colonoscopy    * Fecal Occult Blood Test (FOBT)/Fecal Immunochemical Test (FIT)  * Fecal DNA/Cologuard Test  * Flexible Sigmoidoscopy Age: 45-75 years old   Colonoscopy: every 10 years (may be performed more frequently if at higher risk)  OR  FOBT/FIT: every 1 year  OR  Cologuard: every 3 years  OR  Sigmoidoscopy: every 5 years  Screening may be recommended earlier than age 45 if at higher risk for colorectal cancer. Also, an individualized decision between you and your healthcare provider will decide whether screening between the ages of 76-85 would be appropriate. Colonoscopy: 09/22/2020  FOBT/FIT: Not on file  Cologuard: Not on file  Sigmoidoscopy: Not on file    Screening Current     Breast Cancer Screening Age: 40+ years old  Frequency: every 1-2 years  Not required if history of left and right mastectomy Mammogram: 10/07/2024    Screening Current   Cervical Cancer Screening Between the ages of 21-29, pap smear recommended once every 3 years.   Between the ages of 30-65, can perform pap smear with HPV co-testing every 5 years.   Recommendations may differ for women with a history of total hysterectomy, cervical cancer, or abnormal pap smears in past. Pap Smear: 09/12/2022    Screening Not Indicated   Hepatitis C Screening Once for adults born between 1945 and 1965  More frequently in  patients at high risk for Hepatitis C Hep C Antibody: 10/30/2020    Screening Current   Diabetes Screening 1-2 times per year if you're at risk for diabetes or have pre-diabetes Fasting glucose: 122 mg/dL (5/17/2024)  A1C: 6.1 % (4/26/2024)  Screening Current   Cholesterol Screening Once every 5 years if you don't have a lipid disorder. May order more often based on risk factors. Lipid panel: 04/26/2024    Screening Not Indicated  History Lipid Disorder     Other Preventive Screenings Covered by Medicare:  Abdominal Aortic Aneurysm (AAA) Screening: covered once if your at risk. You're considered to be at risk if you have a family history of AAA.  Lung Cancer Screening: covers low dose CT scan once per year if you meet all of the following conditions: (1) Age 55-77; (2) No signs or symptoms of lung cancer; (3) Current smoker or have quit smoking within the last 15 years; (4) You have a tobacco smoking history of at least 20 pack years (packs per day multiplied by number of years you smoked); (5) You get a written order from a healthcare provider.  Glaucoma Screening: covered annually if you're considered high risk: (1) You have diabetes OR (2) Family history of glaucoma OR (3)  aged 50 and older OR (4)  American aged 65 and older  Osteoporosis Screening: covered every 2 years if you meet one of the following conditions: (1) You're estrogen deficient and at risk for osteoporosis based off medical history and other findings; (2) Have a vertebral abnormality; (3) On glucocorticoid therapy for more than 3 months; (4) Have primary hyperparathyroidism; (5) On osteoporosis medications and need to assess response to drug therapy.   Last bone density test (DXA Scan): 09/27/2022.  HIV Screening: covered annually if you're between the age of 15-65. Also covered annually if you are younger than 15 and older than 65 with risk factors for HIV infection. For pregnant patients, it is covered up to 3 times per  pregnancy.    Immunizations:  Immunization Recommendations   Influenza Vaccine Annual influenza vaccination during flu season is recommended for all persons aged >= 6 months who do not have contraindications   Pneumococcal Vaccine   * Pneumococcal conjugate vaccine = PCV13 (Prevnar 13), PCV15 (Vaxneuvance), PCV20 (Prevnar 20)  * Pneumococcal polysaccharide vaccine = PPSV23 (Pneumovax) Adults 19-63 yo with certain risk factors or if 65+ yo  If never received any pneumonia vaccine: recommend Prevnar 20 (PCV20)  Give PCV20 if previously received 1 dose of PCV13 or PPSV23   Hepatitis B Vaccine 3 dose series if at intermediate or high risk (ex: diabetes, end stage renal disease, liver disease)   Respiratory syncytial virus (RSV) Vaccine - COVERED BY MEDICARE PART D  * RSVPreF3 (Arexvy) CDC recommends that adults 60 years of age and older may receive a single dose of RSV vaccine using shared clinical decision-making (SCDM)   Tetanus (Td) Vaccine - COST NOT COVERED BY MEDICARE PART B Following completion of primary series, a booster dose should be given every 10 years to maintain immunity against tetanus. Td may also be given as tetanus wound prophylaxis.   Tdap Vaccine - COST NOT COVERED BY MEDICARE PART B Recommended at least once for all adults. For pregnant patients, recommended with each pregnancy.   Shingles Vaccine (Shingrix) - COST NOT COVERED BY MEDICARE PART B  2 shot series recommended in those 19 years and older who have or will have weakened immune systems or those 50 years and older     Health Maintenance Due:      Topic Date Due   • Colorectal Cancer Screening  09/22/2025   • Breast Cancer Screening: Mammogram  10/07/2025   • Hepatitis C Screening  Completed   • Cervical Cancer Screening  Discontinued     Immunizations Due:      Topic Date Due   • Pneumococcal Vaccine: 65+ Years (1 of 2 - PCV) Never done   • Influenza Vaccine (1) 09/01/2024   • COVID-19 Vaccine (5 - 2023-24 season) 09/01/2024     Advance  Directives   What are advance directives?  Advance directives are legal documents that state your wishes and plans for medical care. These plans are made ahead of time in case you lose your ability to make decisions for yourself. Advance directives can apply to any medical decision, such as the treatments you want, and if you want to donate organs.   What are the types of advance directives?  There are many types of advance directives, and each state has rules about how to use them. You may choose a combination of any of the following:  Living will:  This is a written record of the treatment you want. You can also choose which treatments you do not want, which to limit, and which to stop at a certain time. This includes surgery, medicine, IV fluid, and tube feedings.   Durable power of  for healthcare (DPAHC):  This is a written record that states who you want to make healthcare choices for you when you are unable to make them for yourself. This person, called a proxy, is usually a family member or a friend. You may choose more than 1 proxy.  Do not resuscitate (DNR) order:  A DNR order is used in case your heart stops beating or you stop breathing. It is a request not to have certain forms of treatment, such as CPR. A DNR order may be included in other types of advance directives.  Medical directive:  This covers the care that you want if you are in a coma, near death, or unable to make decisions for yourself. You can list the treatments you want for each condition. Treatment may include pain medicine, surgery, blood transfusions, dialysis, IV or tube feedings, and a ventilator (breathing machine).  Values history:  This document has questions about your views, beliefs, and how you feel and think about life. This information can help others choose the care that you would choose.  Why are advance directives important?  An advance directive helps you control your care. Although spoken wishes may be used, it  is better to have your wishes written down. Spoken wishes can be misunderstood, or not followed. Treatments may be given even if you do not want them. An advance directive may make it easier for your family to make difficult choices about your care.   Urinary Incontinence   Urinary incontinence (UI)  is when you lose control of your bladder. UI develops because your bladder cannot store or empty urine properly. The 3 most common types of UI are stress incontinence, urge incontinence, or both.  Medicines:   May be given to help strengthen your bladder control. Report any side effects of medication to your healthcare provider.  Do pelvic muscle exercises often:  Your pelvic muscles help you stop urinating. Squeeze these muscles tight for 5 seconds, then relax for 5 seconds. Gradually work up to squeezing for 10 seconds. Do 3 sets of 15 repetitions a day, or as directed. This will help strengthen your pelvic muscles and improve bladder control.  Train your bladder:  Go to the bathroom at set times, such as every 2 hours, even if you do not feel the urge to go. You can also try to hold your urine when you feel the urge to go. For example, hold your urine for 5 minutes when you feel the urge to go. As that becomes easier, hold your urine for 10 minutes.   Self-care:   Keep a UI record.  Write down how often you leak urine and how much you leak. Make a note of what you were doing when you leaked urine.  Drink liquids as directed. You may need to limit the amount of liquid you drink to help control your urine leakage. Do not drink any liquid right before you go to bed. Limit or do not have drinks that contain caffeine or alcohol.   Prevent constipation.  Eat a variety of high-fiber foods. Good examples are high-fiber cereals, beans, vegetables, and whole-grain breads. Walking is the best way to trigger your intestines to have a bowel movement.  Exercise regularly and maintain a healthy weight.  Weight loss and exercise  will decrease pressure on your bladder and help you control your leakage.   Use a catheter as directed  to help empty your bladder. A catheter is a tiny, plastic tube that is put into your bladder to drain your urine.   Go to behavior therapy as directed.  Behavior therapy may be used to help you learn to control your urge to urinate.    Weight Management   Why it is important to manage your weight:  Being overweight increases your risk of health conditions such as heart disease, high blood pressure, type 2 diabetes, and certain types of cancer. It can also increase your risk for osteoarthritis, sleep apnea, and other respiratory problems. Aim for a slow, steady weight loss. Even a small amount of weight loss can lower your risk of health problems.  How to lose weight safely:  A safe and healthy way to lose weight is to eat fewer calories and get regular exercise. You can lose up about 1 pound a week by decreasing the number of calories you eat by 500 calories each day.   Healthy meal plan for weight management:  A healthy meal plan includes a variety of foods, contains fewer calories, and helps you stay healthy. A healthy meal plan includes the following:  Eat whole-grain foods more often.  A healthy meal plan should contain fiber. Fiber is the part of grains, fruits, and vegetables that is not broken down by your body. Whole-grain foods are healthy and provide extra fiber in your diet. Some examples of whole-grain foods are whole-wheat breads and pastas, oatmeal, brown rice, and bulgur.  Eat a variety of vegetables every day.  Include dark, leafy greens such as spinach, kale, dung greens, and mustard greens. Eat yellow and orange vegetables such as carrots, sweet potatoes, and winter squash.   Eat a variety of fruits every day.  Choose fresh or canned fruit (canned in its own juice or light syrup) instead of juice. Fruit juice has very little or no fiber.  Eat low-fat dairy foods.  Drink fat-free (skim) milk or  1% milk. Eat fat-free yogurt and low-fat cottage cheese. Try low-fat cheeses such as mozzarella and other reduced-fat cheeses.  Choose meat and other protein foods that are low in fat.  Choose beans or other legumes such as split peas or lentils. Choose fish, skinless poultry (chicken or turkey), or lean cuts of red meat (beef or pork). Before you cook meat or poultry, cut off any visible fat.   Use less fat and oil.  Try baking foods instead of frying them. Add less fat, such as margarine, sour cream, regular salad dressing and mayonnaise to foods. Eat fewer high-fat foods. Some examples of high-fat foods include french fries, doughnuts, ice cream, and cakes.  Eat fewer sweets.  Limit foods and drinks that are high in sugar. This includes candy, cookies, regular soda, and sweetened drinks.  Exercise:  Exercise at least 30 minutes per day on most days of the week. Some examples of exercise include walking, biking, dancing, and swimming. You can also fit in more physical activity by taking the stairs instead of the elevator or parking farther away from stores. Ask your healthcare provider about the best exercise plan for you.      © Copyright [employer] 2018 Information is for End User's use only and may not be sold, redistributed or otherwise used for commercial purposes. All illustrations and images included in CareNotes® are the copyrighted property of [employer] or [employer]

## 2024-11-12 NOTE — ASSESSMENT & PLAN NOTE
BP now well controlled. Taking lisinopril-HCTZ 20-12.5 mg daily.    Orders:    CBC and differential; Future

## 2024-11-12 NOTE — ASSESSMENT & PLAN NOTE
Lab Results   Component Value Date    EGFR 61 05/17/2024    EGFR 76 04/26/2024    EGFR 81 02/21/2024    CREATININE 0.96 05/17/2024    CREATININE 0.80 04/26/2024    CREATININE 0.76 02/21/2024     Repeat labs.  Avoid NSAIDs.    Orders:    Comprehensive metabolic panel; Future

## 2024-11-15 ENCOUNTER — NURSE TRIAGE (OUTPATIENT)
Age: 69
End: 2024-11-15

## 2024-11-15 NOTE — TELEPHONE ENCOUNTER
Patient calling back. Becoming a little anxious as no one has gotten back to her and it is almost the end of the day. Her cb# is [phone]

## 2024-11-15 NOTE — TELEPHONE ENCOUNTER
"PT warm transferred from [place] to this CTS-RN.    PT reports that salivary glands are still swollen. Reports swelling \"ebbs and flows\" since August. Reports no difficulty swallowing. PT speaking in clear complete sentences throughout call; no wheezing/stridor noted.     Reviewed last office visit note plan per Dr. Jameson: \"No active swelling in her parotid glands currently. Advised her on treatment of salivary gland massage, fluids, and sialogogues when the swelling does occur. If this continues to occur, we can consider CT scan of neck to evaluate for salivary sialoliths.\"    PT reports that night after appointment, she had a nose bleed in her sleep. PT takes baby ASA daily. Unsure if it was related to NPL scope. Denies nosebleeds since.    PT reports that she has been doing all of the recommendations. PT asking if she can see Dr. Bernardo; she requested an appointment via [facility]. Next available December.    Call back: [phone]  Reason for Disposition   Nursing judgment    Answer Assessment - Initial Assessment Questions  1. REASON FOR CALL: \"What is your main concern right now?\"      Continued salivary gland swelling  2. ONSET: \"When did the sxs start?\"      ongoing  3. SEVERITY: \"How bad is the swelling?\"      Mild-moderate  4. FEVER: \"Do you have a fever?\"      denies  5. OTHER SYMPTOMS: \"Do you have any other new symptoms?\"      denies  6. INTERVENTIONS AND RESPONSE: \"What have you done so far to try to make this better? What medications have you used?\"      See note  7. PREGNANCY: \"Is there any chance you are pregnant?\"      N/a    Protocols used: No Protocol Available - Sick Adult-ADULT-[place]    "

## 2024-11-16 ENCOUNTER — APPOINTMENT (OUTPATIENT)
Dept: LAB | Facility: CLINIC | Age: 69
End: 2024-11-16
Payer: MEDICARE

## 2024-11-16 ENCOUNTER — OFFICE VISIT (OUTPATIENT)
Dept: URGENT CARE | Age: 69
End: 2024-11-16
Payer: MEDICARE

## 2024-11-16 VITALS
BODY MASS INDEX: 31.22 KG/M2 | OXYGEN SATURATION: 98 % | RESPIRATION RATE: 18 BRPM | DIASTOLIC BLOOD PRESSURE: 88 MMHG | HEIGHT: 68 IN | WEIGHT: 206 LBS | SYSTOLIC BLOOD PRESSURE: 132 MMHG | HEART RATE: 92 BPM | TEMPERATURE: 98.2 F

## 2024-11-16 DIAGNOSIS — R60.0 SWELLING OF SALIVARY GLAND: ICD-10-CM

## 2024-11-16 DIAGNOSIS — N18.2 CHRONIC KIDNEY DISEASE (CKD), STAGE II (MILD): ICD-10-CM

## 2024-11-16 DIAGNOSIS — I10 ESSENTIAL HYPERTENSION: ICD-10-CM

## 2024-11-16 DIAGNOSIS — E78.2 MIXED HYPERLIPIDEMIA: ICD-10-CM

## 2024-11-16 DIAGNOSIS — Z87.19 H/O PAROTITIS: ICD-10-CM

## 2024-11-16 DIAGNOSIS — B96.89 ACUTE BACTERIAL SIALADENITIS: Primary | ICD-10-CM

## 2024-11-16 DIAGNOSIS — K11.21 ACUTE BACTERIAL SIALADENITIS: Primary | ICD-10-CM

## 2024-11-16 DIAGNOSIS — R73.03 PREDIABETES: ICD-10-CM

## 2024-11-16 LAB
BUN SERPL-MCNC: 16 MG/DL (ref 5–25)
CREAT SERPL-MCNC: 0.86 MG/DL (ref 0.6–1.3)
GFR SERPL CREATININE-BSD FRML MDRD: 70 ML/MIN/1.73SQ M

## 2024-11-16 PROCEDURE — 99213 OFFICE O/P EST LOW 20 MIN: CPT | Performed by: STUDENT IN AN ORGANIZED HEALTH CARE EDUCATION/TRAINING PROGRAM

## 2024-11-16 PROCEDURE — 84520 ASSAY OF UREA NITROGEN: CPT

## 2024-11-16 PROCEDURE — 86235 NUCLEAR ANTIGEN ANTIBODY: CPT

## 2024-11-16 PROCEDURE — 82565 ASSAY OF CREATININE: CPT

## 2024-11-16 PROCEDURE — G0463 HOSPITAL OUTPT CLINIC VISIT: HCPCS | Performed by: STUDENT IN AN ORGANIZED HEALTH CARE EDUCATION/TRAINING PROGRAM

## 2024-11-16 PROCEDURE — 36415 COLL VENOUS BLD VENIPUNCTURE: CPT

## 2024-11-16 RX ORDER — DOXYCYCLINE 100 MG/1
100 TABLET ORAL 2 TIMES DAILY
Qty: 14 TABLET | Refills: 0 | Status: SHIPPED | OUTPATIENT
Start: 2024-11-16 | End: 2024-11-23

## 2024-11-16 NOTE — PROGRESS NOTES
[facility]        NAME: Dora Warner is a 69 y.o. female  : 1955    MRN: [id number]  DATE: 2024  TIME: 9:07 AM    Assessment and Plan   Acute bacterial sialadenitis [K11.21, B96.89]  1. Acute bacterial sialadenitis  doxycycline (ADOXA) 100 MG tablet      Patient with multiple antibiotic allergies.  Will treat with doxycycline for suspected bacterial sialadenitis.  ER precautions for any worsening, to follow-up with CT scan and ENT on Monday.    Patient Instructions   Please continue with the warm compresses, sour foods, massage.  Ensure that you are staying very well-hydrated with water.  You may continue with Tylenol for pain.  I am prescribing antibiotic for concern for an infection in the area.  Please take as prescribed.  You may wish to take it with a probiotic or yogurt to help prevent side effects.  On Monday, please keep your appointment for your CT scan and call your ENT doctor to schedule a follow-up.  If you develop any severe or worsening symptoms in the meantime despite the treatment above such as increasing swelling, pain, persistent fevers, please go to the ER.    Follow up with PCP in 3-5 days.  Proceed to  ER if symptoms worsen.    If tests have been performed at [facility], our office will contact you with results if changes need to be made to the care plan discussed with you at the visit.  You can review your full results on [facility].    Chief Complaint     Chief Complaint   Patient presents with    Facial Swelling     Pt presents with right sided facial swelling. Recurring issue and was seen by ENT. Previous episode in August. Firm to touch with pain radiating into ear. Has been doing warm compresses and massages.          History of Present Illness       Patient presents for concern for right sided facial swelling and pain.  She has been having this issue recurrently since August, she has had the issue with both on the left and on the right.  Right  side seems to usually be worse.  Has seen ENT.  At the time that she saw ENT, her symptoms were not as active, when her new symptoms started she called back and was ordered for CT scan which is scheduled on Monday.  She feels that her symptoms are worsening including more pain and swelling than her previous episodes, pain radiates down into her right side of her neck.  She also measured temperature at home of 100.4 F.  No recent sick symptoms including no congestion, cough, sore throat.  She has been using warm compresses, massage, eating sour foods.  Taking Tylenol for pain.  ENT and PCP notes reviewed.          Review of Systems   Review of Systems   All other systems reviewed and are negative.        Current Medications       Current Outpatient Medications:     aspirin 81 mg chewable tablet, Chew, Disp: , Rfl:     Cholecalciferol (Vitamin D3) 50 MCG (2000 UT) TABS, Take by mouth 2 daily, Disp: , Rfl:     CINNAMON PO, Take by mouth, Disp: , Rfl:     doxycycline (ADOXA) 100 MG tablet, Take 1 tablet (100 mg total) by mouth 2 (two) times a day for 7 days, Disp: 14 tablet, Rfl: 0    ipratropium (ATROVENT) 0.03 % nasal spray, 2 sprays into each nostril every 12 (twelve) hours, Disp: 30 mL, Rfl: 3    lisinopril-hydrochlorothiazide (PRINZIDE,ZESTORETIC) 20-12.5 MG per tablet, Take 1 tablet by mouth once daily, Disp: 90 tablet, Rfl: 1    Multiple Vitamins-Minerals (CENTRUM ADULTS PO), Take by mouth, Disp: , Rfl:     omeprazole (PriLOSEC) 20 mg delayed release capsule, Take 20 mg by mouth daily in the early morning  , Disp: , Rfl:     EPINEPHrine (EPIPEN) 0.3 mg/0.3 mL SOAJ, Inject 0.3 mL (0.3 mg total) into a muscle once for 1 dose, Disp: 0.6 mL, Rfl: 0    Current Allergies     Allergies as of 11/16/2024 - Reviewed 11/16/2024   Allergen Reaction Noted    Drug [diclofenac sodium] Tongue Swelling 07/08/2021    Garlic - food allergy Throat Swelling 08/11/2020    Latex Anaphylaxis 10/05/2015    Meperidine Tongue Swelling  10/05/2015    Nsaids GI Bleeding 2020    Other Throat Swelling 2021    Percocet [oxycodone-acetaminophen] Itching 2020    Formaldehyde Other (See Comments) 2020    Levaquin [levofloxacin] Other (See Comments) 2020    Adhesive [medical tape] Rash 2020    Penicillins Rash 10/05/2015    Sulfa antibiotics Rash 10/05/2015            The following portions of the patient's history were reviewed and updated as appropriate: allergies, current medications, past family history, past medical history, past social history, past surgical history and problem list.     Past Medical History:   Diagnosis Date    Allergic     Asthma     Cough/Allergy Induced    Dahl esophagus     Dahl's esophagus     Basal cell carcinoma 2021    Left nasal ala crease    Closed left ankle fracture     chip    Diverticulitis     Ear problems     Fibromyalgia, primary     GERD (gastroesophageal reflux disease)     HL (hearing loss)     Temporary loss left ear late in 2 pregnancies    Hypertension     Otitis media     Scoliosis 1985    Urinary tract infection     Visual impairment May 2021    Retina specialist seen       Past Surgical History:   Procedure Laterality Date    BAND HEMORRHOIDECTOMY       SECTION  1986     SECTION  1989    CHOLECYSTECTOMY  Dec. 2021    COLONOSCOPY      EGD      ELBOW SURGERY Left     cyst removal    MOHS SURGERY Left 09/15/2021    RADHA-    VA LAPAROSCOPY SURG CHOLECYSTECTOMY N/A 2021    Procedure: LAPAROSCOPIC CHOLECYSTECTOMY;  Surgeon: Riccardo Mcdonald MD;  Location: [facility];  Service: General    SKIN BIOPSY      UPPER GASTROINTESTINAL ENDOSCOPY         Family History   Problem Relation Age of Onset    Diabetes Mother 70    Hypertension Mother     Heart disease Mother     Hyperlipidemia Mother     Prostate cancer Father     Diabetes Father 70    Hypertension Father     Heart disease Father 60    Stroke Father     Hyperlipidemia Father   "   Melanoma Sister     Hypertension Sister     Hyperlipidemia Sister     Asthma Daughter     Heart disease Maternal Grandmother     Colon cancer Maternal Grandfather 80    Asthma Maternal Grandfather     Asthma Paternal Grandmother     No Known Problems Paternal Grandfather     Hyperlipidemia Brother     Melanoma Brother     Asthma Brother     Lung disease Brother     Dahl's esophagus Brother     Hypertension Brother     Cancer Brother 68        Oral pharyngial    Dahl's esophagus Brother     Heart disease Brother         A-Fib    Hyperlipidemia Brother     Dahl's esophagus Brother     Hypertension Brother     Heart disease Brother         Heart attack in 40s. 1 stent    No Known Problems Son     No Known Problems Maternal Aunt     No Known Problems Maternal Aunt     No Known Problems Maternal Aunt     Alcohol abuse Neg Hx     Substance Abuse Neg Hx     Mental illness Neg Hx     Depression Neg Hx          Medications have been verified.        Objective   /88   Pulse 92   Temp 98.2 °F (36.8 °C)   Resp 18   Ht 5' 8\" (1.727 m)   Wt 93.4 kg (206 lb)   SpO2 98%   BMI 31.32 kg/m²   No LMP recorded. Patient is postmenopausal.       Physical Exam     Physical Exam  Vitals and nursing note reviewed.   Constitutional:       General: She is not in acute distress.     Appearance: She is not toxic-appearing.   HENT:      Head: Normocephalic and atraumatic.      Jaw: Swelling present.      Comments: Significant firm swelling over the right parotid area, more warmth than on the left.  Tender to palpation.  Swelling is firm, no fluctuance appreciated.  Able to express purulent drainage from R duct orifice.     Right Ear: External ear normal.      Left Ear: External ear normal.      Nose: Nose normal.      Mouth/Throat:      Mouth: Mucous membranes are moist.   Eyes:      Extraocular Movements: Extraocular movements intact.      Conjunctiva/sclera: Conjunctivae normal.   Skin:     General: Skin is warm and " dry.   Neurological:      Mental Status: She is alert.

## 2024-11-16 NOTE — PATIENT INSTRUCTIONS
Please continue with the warm compresses, sour foods, massage.  Ensure that you are staying very well-hydrated with water.  You may continue with Tylenol for pain.  I am prescribing antibiotic for concern for an infection in the area.  Please take as prescribed.  You may wish to take it with a probiotic or yogurt to help prevent side effects.  On Monday, please keep your appointment for your CT scan and call your ENT doctor to schedule a follow-up.  If you develop any severe or worsening symptoms in the meantime despite the treatment above such as increasing swelling, pain, persistent fevers, please go to the ER.

## 2024-11-18 ENCOUNTER — HOSPITAL ENCOUNTER (OUTPATIENT)
Dept: CT IMAGING | Facility: HOSPITAL | Age: 69
Discharge: HOME | End: 2024-11-18
Payer: MEDICARE

## 2024-11-18 DIAGNOSIS — R60.0 SWELLING OF SALIVARY GLAND: ICD-10-CM

## 2024-11-18 LAB
ENA SS-A AB SER-ACNC: <0.2 AI (ref 0–0.9)
ENA SS-B AB SER-ACNC: 0.4 AI (ref 0–0.9)

## 2024-11-18 PROCEDURE — 70491 CT SOFT TISSUE NECK W/DYE: CPT

## 2024-11-18 RX ADMIN — IOHEXOL 75 ML: 350 INJECTION, SOLUTION INTRAVENOUS at 09:13

## 2024-12-31 ENCOUNTER — TELEPHONE (OUTPATIENT)
Age: 69
End: 2024-12-31

## 2024-12-31 DIAGNOSIS — U07.1 COVID-19: Primary | ICD-10-CM

## 2024-12-31 RX ORDER — NIRMATRELVIR AND RITONAVIR 300-100 MG
3 KIT ORAL 2 TIMES DAILY
Qty: 30 TABLET | Refills: 0 | Status: SHIPPED | OUTPATIENT
Start: 2024-12-31 | End: 2025-01-05

## 2024-12-31 NOTE — TELEPHONE ENCOUNTER
Pt called requesting medication, she tested Covid + today with all the classic symptoms and would like Paxlovid to be called in please.     Please advise    Pharm is confirmed

## 2025-01-09 NOTE — TELEPHONE ENCOUNTER
Pt developed a cough and post nasal drip last night.  Not taking anything OTC.  Patient requesting advise as to what she can take OTC or if she needs an Rx.  [facility] pharmacy

## 2025-01-09 NOTE — TELEPHONE ENCOUNTER
You can try using Flonase 1 to 2 times a day or saline spray.  If you have some phlegm, can take Mucinex or Robitussin (regular) twice a day, as needed.

## 2025-01-14 ENCOUNTER — OFFICE VISIT (OUTPATIENT)
Dept: INTERNAL MEDICINE CLINIC | Facility: CLINIC | Age: 70
End: 2025-01-14
Payer: MEDICARE

## 2025-01-14 VITALS
RESPIRATION RATE: 16 BRPM | HEIGHT: 68 IN | DIASTOLIC BLOOD PRESSURE: 74 MMHG | TEMPERATURE: 97.6 F | HEART RATE: 71 BPM | BODY MASS INDEX: 30.92 KG/M2 | OXYGEN SATURATION: 94 % | SYSTOLIC BLOOD PRESSURE: 114 MMHG | WEIGHT: 204 LBS

## 2025-01-14 DIAGNOSIS — U07.1 COVID-19 VIRUS INFECTION: ICD-10-CM

## 2025-01-14 DIAGNOSIS — R60.0 SWELLING OF SALIVARY GLAND: ICD-10-CM

## 2025-01-14 DIAGNOSIS — J20.9 ACUTE BRONCHITIS, UNSPECIFIED ORGANISM: Primary | ICD-10-CM

## 2025-01-14 PROCEDURE — 99213 OFFICE O/P EST LOW 20 MIN: CPT | Performed by: INTERNAL MEDICINE

## 2025-01-14 RX ORDER — FLUTICASONE PROPIONATE 110 UG/1
2 AEROSOL, METERED RESPIRATORY (INHALATION) 2 TIMES DAILY
Qty: 12 G | Refills: 1 | Status: CANCELLED | OUTPATIENT
Start: 2025-01-14

## 2025-01-14 NOTE — PROGRESS NOTES
Name: Dora Wraner      : 1955      MRN: [id number]  Encounter Provider: Flower Beasley MD  Encounter Date: 2025   Encounter department: [facility] INTERNAL MEDICINE  :  Assessment & Plan  Acute bronchitis, unspecified organism  Suspect new symptoms with another infection, explained this may still be viral.  Declined antibiotics, will call later this week if no improvement.  Declined steroid or albuterol inhaler.  May stop Mucinex.  Instructed to use Flonase daily, saline spray prn.       COVID-19 virus infection  Completed Paxlovid.         Swelling of salivary gland  Saw ENT, rheumatology. ? [facility]              History of Present Illness     She reports that she had felt that she had recovered from COVID, took Paxlovid.  About a week ago, she started to experience increased nasal congestion and bodyaches.  She reports low-grade fever which lasted about 2 days.  Temperature would range between .1.  She reports frequent cough, occasionally productive of phlegm.  She denies any chest pain or tightness, maybe a little bit of wheezing.  She reports mostly postnasal drip triggering coughing episodes.  She has been taking Mucinex for the last 6 days.  She was initially using Flonase, switch to a saline spray a few days ago.  She reports family members have also been sick.    She has been very worried recently since her daughter lives in [place], with ongoing fires.      Review of Systems   Constitutional:  Positive for fatigue and fever. Negative for activity change and appetite change.   HENT:  Positive for congestion and postnasal drip. Negative for ear pain, sinus pressure, sinus pain, sore throat and trouble swallowing.    Respiratory:  Positive for cough. Negative for chest tightness.    Cardiovascular:  Negative for chest pain.   Neurological:  Negative for dizziness and headaches.       Objective   /74 (BP Location: Right arm, Patient Position: Sitting, Cuff  "Size: Large)   Pulse 71   Temp 97.6 °F (36.4 °C) (Temporal)   Resp 16   Ht 5' 8\" (1.727 m)   Wt 92.5 kg (204 lb)   SpO2 94%   BMI 31.02 kg/m²      Physical Exam  Constitutional:       General: She is not in acute distress.     Appearance: She is not ill-appearing.   HENT:      Head: Normocephalic and atraumatic.      Salivary Glands: Right salivary gland is not diffusely enlarged or tender. Left salivary gland is not diffusely enlarged or tender.      Right Ear: Tympanic membrane, ear canal and external ear normal.      Left Ear: Tympanic membrane, ear canal and external ear normal.      Nose: Congestion and rhinorrhea present.      Mouth/Throat:      Mouth: Mucous membranes are moist.      Pharynx: Oropharynx is clear. No pharyngeal swelling or posterior oropharyngeal erythema.   Eyes:      Pupils: Pupils are equal, round, and reactive to light.   Cardiovascular:      Rate and Rhythm: Normal rate and regular rhythm.   Pulmonary:      Effort: Pulmonary effort is normal. No respiratory distress.      Breath sounds: Normal breath sounds. No wheezing or rhonchi.   Neurological:      General: No focal deficit present.      Mental Status: She is alert and oriented to person, place, and time.   Psychiatric:         Mood and Affect: Mood normal.         "

## 2025-01-16 ENCOUNTER — TELEPHONE (OUTPATIENT)
Age: 70
End: 2025-01-16

## 2025-01-16 DIAGNOSIS — J20.9 ACUTE BRONCHITIS, UNSPECIFIED ORGANISM: Primary | ICD-10-CM

## 2025-01-16 RX ORDER — AZITHROMYCIN 250 MG/1
TABLET, FILM COATED ORAL
Qty: 6 TABLET | Refills: 0 | Status: SHIPPED | OUTPATIENT
Start: 2025-01-16 | End: 2025-01-20

## 2025-01-16 NOTE — TELEPHONE ENCOUNTER
Patient calling back to follow up post OV 1/14/25 with PCP for bronchitis. At time of OV patient declined antibiotic, patient now requesting PCP order one for her. States no improvement with the nasal congestion and cough. Denies any worsening symptoms and SOB. Still declining need for inhaler and steroids. Please reach out to patient with provider response for antibiotic order.

## 2025-02-18 ENCOUNTER — TRANSCRIBE ORDERS (OUTPATIENT)
Dept: LAB | Facility: CLINIC | Age: 70
End: 2025-02-18

## 2025-02-18 ENCOUNTER — APPOINTMENT (OUTPATIENT)
Dept: LAB | Facility: CLINIC | Age: 70
End: 2025-02-18
Payer: MEDICARE

## 2025-02-18 DIAGNOSIS — D86.9 SARCOIDOSIS: ICD-10-CM

## 2025-02-18 DIAGNOSIS — M25.50 POLYARTHRALGIA: ICD-10-CM

## 2025-02-18 DIAGNOSIS — R60.0 SWELLING OF SUBMANDIBULAR GLAND: Primary | ICD-10-CM

## 2025-02-18 DIAGNOSIS — R60.0 SWELLING OF SUBMANDIBULAR GLAND: ICD-10-CM

## 2025-02-18 LAB
25(OH)D3 SERPL-MCNC: 68.3 NG/ML (ref 30–100)
CRP SERPL QL: 2.8 MG/L
ERYTHROCYTE [SEDIMENTATION RATE] IN BLOOD: 28 MM/HOUR (ref 0–29)

## 2025-02-18 PROCEDURE — 86430 RHEUMATOID FACTOR TEST QUAL: CPT

## 2025-02-18 PROCEDURE — 85652 RBC SED RATE AUTOMATED: CPT

## 2025-02-18 PROCEDURE — 86334 IMMUNOFIX E-PHORESIS SERUM: CPT

## 2025-02-18 PROCEDURE — 86038 ANTINUCLEAR ANTIBODIES: CPT

## 2025-02-18 PROCEDURE — 36415 COLL VENOUS BLD VENIPUNCTURE: CPT

## 2025-02-18 PROCEDURE — 84165 PROTEIN E-PHORESIS SERUM: CPT

## 2025-02-18 PROCEDURE — 85549 MURAMIDASE: CPT

## 2025-02-18 PROCEDURE — 86225 DNA ANTIBODY NATIVE: CPT

## 2025-02-18 PROCEDURE — 82787 IGG 1 2 3 OR 4 EACH: CPT

## 2025-02-18 PROCEDURE — 82306 VITAMIN D 25 HYDROXY: CPT

## 2025-02-18 PROCEDURE — 86140 C-REACTIVE PROTEIN: CPT

## 2025-02-19 LAB
ANA SER QL IF: NEGATIVE
DSDNA IGG SERPL IA-ACNC: <0.9 IU/ML (ref ?–15)
NUCLEAR IGG SER IA-RTO: 0.2 RATIO (ref ?–1)
RHEUMATOID FACT SER QL LA: NEGATIVE

## 2025-02-20 LAB
ALBUMIN SERPL ELPH-MCNC: 4.53 G/DL (ref 3.2–5.1)
ALBUMIN SERPL ELPH-MCNC: 62.1 % (ref 48–70)
ALPHA1 GLOB SERPL ELPH-MCNC: 0.29 G/DL (ref 0.15–0.47)
ALPHA1 GLOB SERPL ELPH-MCNC: 4 % (ref 1.8–7)
ALPHA2 GLOB SERPL ELPH-MCNC: 0.88 G/DL (ref 0.42–1.04)
ALPHA2 GLOB SERPL ELPH-MCNC: 12 % (ref 5.9–14.9)
BETA GLOB ABNORMAL SERPL ELPH-MCNC: 0.45 G/DL (ref 0.31–0.57)
BETA1 GLOB SERPL ELPH-MCNC: 6.1 % (ref 4.7–7.7)
BETA2 GLOB SERPL ELPH-MCNC: 5.1 % (ref 3.1–7.9)
BETA2+GAMMA GLOB SERPL ELPH-MCNC: 0.37 G/DL (ref 0.2–0.58)
GAMMA GLOB ABNORMAL SERPL ELPH-MCNC: 0.78 G/DL (ref 0.4–1.66)
GAMMA GLOB SERPL ELPH-MCNC: 10.7 % (ref 6.9–22.3)
IGG/ALB SER: 1.64 {RATIO} (ref 1.1–1.8)
INTERPRETATION UR IFE-IMP: NORMAL
PROT PATTERN SERPL ELPH-IMP: NORMAL
PROT SERPL-MCNC: 7.3 G/DL (ref 6.4–8.2)

## 2025-02-20 PROCEDURE — 86334 IMMUNOFIX E-PHORESIS SERUM: CPT | Performed by: STUDENT IN AN ORGANIZED HEALTH CARE EDUCATION/TRAINING PROGRAM

## 2025-02-20 PROCEDURE — 84165 PROTEIN E-PHORESIS SERUM: CPT | Performed by: STUDENT IN AN ORGANIZED HEALTH CARE EDUCATION/TRAINING PROGRAM

## 2025-02-21 LAB — LYSOZYME SERPL-MCNC: 5.4 UG/ML (ref 3.6–8.1)

## 2025-02-23 LAB — MISCELLANEOUS LAB TEST RESULT: NORMAL

## 2025-02-24 DIAGNOSIS — I10 ESSENTIAL HYPERTENSION: ICD-10-CM

## 2025-02-24 RX ORDER — LISINOPRIL AND HYDROCHLOROTHIAZIDE 12.5; 2 MG/1; MG/1
1 TABLET ORAL DAILY
Qty: 90 TABLET | Refills: 1 | Status: SHIPPED | OUTPATIENT
Start: 2025-02-24

## 2025-03-20 ENCOUNTER — HOSPITAL ENCOUNTER (OUTPATIENT)
Dept: RADIOLOGY | Age: 70
Discharge: HOME | End: 2025-03-20
Payer: MEDICARE

## 2025-03-20 VITALS — WEIGHT: 204 LBS | BODY MASS INDEX: 32.78 KG/M2 | HEIGHT: 66 IN

## 2025-03-20 DIAGNOSIS — M81.0 AGE-RELATED OSTEOPOROSIS WITHOUT CURRENT PATHOLOGICAL FRACTURE: ICD-10-CM

## 2025-03-20 PROCEDURE — 77080 DXA BONE DENSITY AXIAL: CPT

## 2025-03-21 ENCOUNTER — RESULTS FOLLOW-UP (OUTPATIENT)
Dept: INTERNAL MEDICINE CLINIC | Facility: CLINIC | Age: 70
End: 2025-03-21

## 2025-05-06 ENCOUNTER — RA CDI HCC (OUTPATIENT)
Dept: OTHER | Facility: HOSPITAL | Age: 70
End: 2025-05-06

## 2025-05-09 ENCOUNTER — APPOINTMENT (OUTPATIENT)
Dept: LAB | Facility: CLINIC | Age: 70
End: 2025-05-09
Payer: MEDICARE

## 2025-05-09 LAB
ALBUMIN SERPL BCG-MCNC: 4.6 G/DL (ref 3.5–5)
ALP SERPL-CCNC: 48 U/L (ref 34–104)
ALT SERPL W P-5'-P-CCNC: 35 U/L (ref 7–52)
ANION GAP SERPL CALCULATED.3IONS-SCNC: 11 MMOL/L (ref 4–13)
AST SERPL W P-5'-P-CCNC: 30 U/L (ref 13–39)
BASOPHILS # BLD AUTO: 0.06 THOUSANDS/ÂΜL (ref 0–0.1)
BASOPHILS NFR BLD AUTO: 1 % (ref 0–1)
BILIRUB SERPL-MCNC: 0.71 MG/DL (ref 0.2–1)
BUN SERPL-MCNC: 25 MG/DL (ref 5–25)
CALCIUM SERPL-MCNC: 9.7 MG/DL (ref 8.4–10.2)
CHLORIDE SERPL-SCNC: 96 MMOL/L (ref 96–108)
CHOLEST SERPL-MCNC: 244 MG/DL (ref ?–200)
CO2 SERPL-SCNC: 30 MMOL/L (ref 21–32)
CREAT SERPL-MCNC: 0.88 MG/DL (ref 0.6–1.3)
EOSINOPHIL # BLD AUTO: 0.08 THOUSAND/ÂΜL (ref 0–0.61)
EOSINOPHIL NFR BLD AUTO: 1 % (ref 0–6)
ERYTHROCYTE [DISTWIDTH] IN BLOOD BY AUTOMATED COUNT: 12.8 % (ref 11.6–15.1)
EST. AVERAGE GLUCOSE BLD GHB EST-MCNC: 126 MG/DL
GFR SERPL CREATININE-BSD FRML MDRD: 67 ML/MIN/1.73SQ M
GLUCOSE P FAST SERPL-MCNC: 124 MG/DL (ref 65–99)
HBA1C MFR BLD: 6 %
HCT VFR BLD AUTO: 44.5 % (ref 34.8–46.1)
HDLC SERPL-MCNC: 57 MG/DL
HGB BLD-MCNC: 14.5 G/DL (ref 11.5–15.4)
IMM GRANULOCYTES # BLD AUTO: 0.02 THOUSAND/UL (ref 0–0.2)
IMM GRANULOCYTES NFR BLD AUTO: 0 % (ref 0–2)
LDLC SERPL CALC-MCNC: 139 MG/DL (ref 0–100)
LYMPHOCYTES # BLD AUTO: 2.6 THOUSANDS/ÂΜL (ref 0.6–4.47)
LYMPHOCYTES NFR BLD AUTO: 34 % (ref 14–44)
MCH RBC QN AUTO: 29.9 PG (ref 26.8–34.3)
MCHC RBC AUTO-ENTMCNC: 32.6 G/DL (ref 31.4–37.4)
MCV RBC AUTO: 92 FL (ref 82–98)
MONOCYTES # BLD AUTO: 0.72 THOUSAND/ÂΜL (ref 0.17–1.22)
MONOCYTES NFR BLD AUTO: 9 % (ref 4–12)
NEUTROPHILS # BLD AUTO: 4.17 THOUSANDS/ÂΜL (ref 1.85–7.62)
NEUTS SEG NFR BLD AUTO: 55 % (ref 43–75)
NONHDLC SERPL-MCNC: 187 MG/DL
NRBC BLD AUTO-RTO: 0 /100 WBCS
PLATELET # BLD AUTO: 348 THOUSANDS/UL (ref 149–390)
PMV BLD AUTO: 10.7 FL (ref 8.9–12.7)
POTASSIUM SERPL-SCNC: 3.9 MMOL/L (ref 3.5–5.3)
PROT SERPL-MCNC: 7.3 G/DL (ref 6.4–8.4)
RBC # BLD AUTO: 4.85 MILLION/UL (ref 3.81–5.12)
SODIUM SERPL-SCNC: 137 MMOL/L (ref 135–147)
TRIGL SERPL-MCNC: 241 MG/DL (ref ?–150)
WBC # BLD AUTO: 7.65 THOUSAND/UL (ref 4.31–10.16)

## 2025-05-13 ENCOUNTER — OFFICE VISIT (OUTPATIENT)
Dept: INTERNAL MEDICINE CLINIC | Facility: CLINIC | Age: 70
End: 2025-05-13
Payer: MEDICARE

## 2025-05-13 VITALS
BODY MASS INDEX: 32.78 KG/M2 | SYSTOLIC BLOOD PRESSURE: 120 MMHG | HEIGHT: 66 IN | HEART RATE: 80 BPM | DIASTOLIC BLOOD PRESSURE: 80 MMHG | OXYGEN SATURATION: 96 % | TEMPERATURE: 97.7 F | WEIGHT: 204 LBS | RESPIRATION RATE: 14 BRPM

## 2025-05-13 DIAGNOSIS — R73.03 PREDIABETES: ICD-10-CM

## 2025-05-13 DIAGNOSIS — I10 ESSENTIAL HYPERTENSION: ICD-10-CM

## 2025-05-13 DIAGNOSIS — N39.46 MIXED STRESS AND URGE URINARY INCONTINENCE: ICD-10-CM

## 2025-05-13 DIAGNOSIS — F41.1 GENERALIZED ANXIETY DISORDER: ICD-10-CM

## 2025-05-13 DIAGNOSIS — M76.61 ACHILLES TENDINITIS OF BOTH LOWER EXTREMITIES: ICD-10-CM

## 2025-05-13 DIAGNOSIS — M76.62 ACHILLES TENDINITIS OF BOTH LOWER EXTREMITIES: ICD-10-CM

## 2025-05-13 DIAGNOSIS — M54.16 LUMBAR RADICULOPATHY: ICD-10-CM

## 2025-05-13 DIAGNOSIS — N18.2 CHRONIC KIDNEY DISEASE (CKD), STAGE II (MILD): ICD-10-CM

## 2025-05-13 DIAGNOSIS — M84.372S: ICD-10-CM

## 2025-05-13 DIAGNOSIS — M81.0 AGE-RELATED OSTEOPOROSIS WITHOUT CURRENT PATHOLOGICAL FRACTURE: ICD-10-CM

## 2025-05-13 DIAGNOSIS — E78.2 MIXED HYPERLIPIDEMIA: ICD-10-CM

## 2025-05-13 DIAGNOSIS — R60.0 SWELLING OF SALIVARY GLAND: Primary | ICD-10-CM

## 2025-05-13 DIAGNOSIS — K22.70 BARRETT'S ESOPHAGUS WITHOUT DYSPLASIA: ICD-10-CM

## 2025-05-13 DIAGNOSIS — E53.8 VITAMIN B12 DEFICIENCY: ICD-10-CM

## 2025-05-13 PROBLEM — R32 URINARY INCONTINENCE: Status: ACTIVE | Noted: 2025-05-13

## 2025-05-13 PROBLEM — R00.2 PALPITATIONS: Status: ACTIVE | Noted: 2025-05-13

## 2025-05-13 PROCEDURE — 99214 OFFICE O/P EST MOD 30 MIN: CPT | Performed by: INTERNAL MEDICINE

## 2025-05-13 PROCEDURE — G2211 COMPLEX E/M VISIT ADD ON: HCPCS | Performed by: INTERNAL MEDICINE

## 2025-05-13 NOTE — ASSESSMENT & PLAN NOTE
Lipids remain elevated.  Declines statin. Briefly discussed Zetia.  Orders:  •  Lipid panel; Future

## 2025-05-13 NOTE — PROGRESS NOTES
Name: Dora Warner      : 1955      MRN: [id number]  Encounter Provider: Flower Beasley MD  Encounter Date: 2025   Encounter department: [facility] INTERNAL MEDICINE  :  Assessment & Plan  Swelling of salivary gland  Declined Lip biopsy to confirm Sjogren's diagnosis since no recent symptoms. She continues to massage it regularly.       Essential hypertension  BP controlled, taking lisinopril-HCTZ 20-12.5 mg daily.   Discussed stopping HCTZ due to dry skin.       Chronic kidney disease (CKD), stage II (mild)  Lab Results   Component Value Date    EGFR 67 2025    EGFR 70 2024    EGFR 61 2024    CREATININE 0.88 2025    CREATININE 0.86 2024    CREATININE 0.96 2024     Stable.  Orders:  •  Comprehensive metabolic panel; Future    Dahl's esophagus without dysplasia  Taking PPI daily.  Follow up with GI, EGD due.       Age-related osteoporosis without current pathological fracture  Declines treatment.  Recent bone density showed osteopenia.  Taking daily D3, daily walks.       Mixed hyperlipidemia  Lipids remain elevated.  Declines statin. Briefly discussed Zetia.  Orders:  •  Lipid panel; Future    Prediabetes  A1c stable at 6%.  Orders:  •  Hemoglobin A1C; Future    Generalized anxiety disorder  No change.       Achilles tendinitis of both lower extremities  No change.       Vitamin B12 deficiency  Start B12.  Orders:  •  Vitamin B12; Future    Stress fracture, left ankle, sequela  Ongoing issues, follow up with Ortho.       Lumbar radiculopathy  Intermittent left sciatica symptoms.  Follow up with pain, Ortho.       Mixed stress and urge urinary incontinence  Discussed Urogynecology evaluation, has an appointment with gynecology.       Follow up in 6 months or as needed.     History of Present Illness   She saw the rheumatologist a few months ago. She reports no further swelling of both her cheeks. She massages it frequently. She saw ENT and the  "dentist.  She did not feel the need to do a lip biopsy since she has had no issues.  She does have a history of dry eyes.  She has been using eyedrops for that for a long time.  Recently, she complains of more dryness of her skin.  She is asking if it is related to her blood pressure medication.    Since she fractured her left ankle, she continues to have issues with prolonged weightbearing.  She cannot walk too far or do her daily activities without experiencing pain.  She would notice occasional swelling at the end of the day.  She denies to do her exercises, did go to physical therapy for a year.  She is very frustrated about this.  She complains of intermittent pain on her left hip down her left thigh.    She is asking if the bone density is related to her hip bursitis, since 1 area is worse.  She is not interested to pursue any treatment for her osteoporosis due to her sensitivity to medications.    She started taking probiotics, asking if it will help firm up her stools.      Review of Systems   Constitutional:  Negative for fatigue.   Eyes:  Negative for itching and visual disturbance.   Cardiovascular:  Negative for chest pain.   Gastrointestinal:  Negative for constipation, nausea and vomiting.   Genitourinary:  Negative for dysuria and urgency.   Musculoskeletal:  Positive for arthralgias, gait problem and joint swelling.   Neurological:  Negative for dizziness and headaches.   Psychiatric/Behavioral:  Negative for confusion, decreased concentration and sleep disturbance.        Objective   /80 (BP Location: Left arm, Patient Position: Sitting, Cuff Size: Large)   Pulse 80   Temp 97.7 °F (36.5 °C)   Resp 14   Ht 5' 6.25\" (1.683 m)   Wt 92.5 kg (204 lb)   SpO2 96%   BMI 32.68 kg/m²      Physical Exam  Vitals and nursing note reviewed.   Constitutional:       General: She is not in acute distress.     Appearance: She is well-developed.   HENT:      Head: Normocephalic and atraumatic.      Jaw: " No swelling.   Eyes:      Pupils: Pupils are equal, round, and reactive to light.   Cardiovascular:      Rate and Rhythm: Normal rate and regular rhythm.      Heart sounds: Normal heart sounds.   Pulmonary:      Effort: Pulmonary effort is normal.      Breath sounds: Normal breath sounds. No wheezing.   Abdominal:      General: Bowel sounds are normal.      Palpations: Abdomen is soft.   Musculoskeletal:         General: No swelling.      Right lower leg: No edema.      Left lower leg: No edema.   Skin:     General: Skin is warm.      Findings: No rash.   Neurological:      General: No focal deficit present.      Mental Status: She is alert and oriented to person, place, and time.   Psychiatric:         Mood and Affect: Mood and affect normal. Mood is not anxious or depressed.         Behavior: Behavior normal.           Labs & imaging results reviewed with patient.

## 2025-05-13 NOTE — ASSESSMENT & PLAN NOTE
BP controlled, taking lisinopril-HCTZ 20-12.5 mg daily.   Discussed stopping HCTZ due to dry skin.

## 2025-05-13 NOTE — ASSESSMENT & PLAN NOTE
Lab Results   Component Value Date    EGFR 67 05/09/2025    EGFR 70 11/16/2024    EGFR 61 05/17/2024    CREATININE 0.88 05/09/2025    CREATININE 0.86 11/16/2024    CREATININE 0.96 05/17/2024     Stable.  Orders:  •  Comprehensive metabolic panel; Future

## 2025-05-13 NOTE — ASSESSMENT & PLAN NOTE
Declined Lip biopsy to confirm Sjogren's diagnosis since no recent symptoms. She continues to massage it regularly.

## 2025-07-18 ENCOUNTER — TELEPHONE (OUTPATIENT)
Age: 70
End: 2025-07-18

## 2025-07-18 ENCOUNTER — PREP FOR PROCEDURE (OUTPATIENT)
Age: 70
End: 2025-07-18

## 2025-07-18 DIAGNOSIS — K21.00 GASTROESOPHAGEAL REFLUX DISEASE WITH ESOPHAGITIS WITHOUT HEMORRHAGE: Primary | ICD-10-CM

## 2025-07-18 DIAGNOSIS — Z86.0100 HISTORY OF COLON POLYPS: ICD-10-CM

## 2025-07-18 NOTE — TELEPHONE ENCOUNTER
Scheduled date of colonoscopy (as of today): 10/14   Physician performing colonoscopy: ANYA   Location of colonoscopy: [facility]   Bowel prep reviewed with patient: JOYCE/GAIL   Instructions reviewed with patient by: CAITLIN   Clearances: NONE

## 2025-07-18 NOTE — TELEPHONE ENCOUNTER
07/18/25  Screened by: Maci Trevizo    Referring Provider     Pre- Screening: RECALL EGD/COLON     BMI 32.68   Has patient been referred for a routine screening Colonoscopy? yes  Is the patient between 45-75 years old? yes      Previous Colonoscopy yes   If yes:    Date:     Facility:     Reason:       Does the patient want to see a Gastroenterologist prior to their procedure OR are they having any GI symptoms? no    Has the patient been hospitalized or had abdominal surgery in the past 6 months? no    Does the patient use supplemental oxygen? no    Does the patient take Coumadin, Lovenox, Plavix, Elliquis, Xarelto, or other blood thinning medication? no    Has the patient had a stroke, cardiac event, or stent placed in the past year? no    If patient is between 45yrs - 49yrs, please advise patient that we will have to confirm benefits & coverage with their insurance company for a routine screening colonoscopy.

## 2025-07-30 ENCOUNTER — OFFICE VISIT (OUTPATIENT)
Dept: INTERNAL MEDICINE CLINIC | Facility: CLINIC | Age: 70
End: 2025-07-30
Payer: MEDICARE

## 2025-07-30 VITALS
HEART RATE: 100 BPM | SYSTOLIC BLOOD PRESSURE: 122 MMHG | HEIGHT: 66 IN | OXYGEN SATURATION: 99 % | WEIGHT: 201 LBS | DIASTOLIC BLOOD PRESSURE: 80 MMHG | TEMPERATURE: 97.9 F | BODY MASS INDEX: 32.3 KG/M2

## 2025-07-30 DIAGNOSIS — B36.9 FUNGAL DERMATITIS: ICD-10-CM

## 2025-07-30 DIAGNOSIS — R35.0 URINE FREQUENCY: Primary | ICD-10-CM

## 2025-07-30 DIAGNOSIS — N39.46 MIXED STRESS AND URGE URINARY INCONTINENCE: ICD-10-CM

## 2025-07-30 DIAGNOSIS — M54.16 LUMBAR RADICULOPATHY: ICD-10-CM

## 2025-07-30 LAB
BILIRUB UR QL STRIP: NEGATIVE
CLARITY UR: CLEAR
COLOR UR: COLORLESS
GLUCOSE UR STRIP-MCNC: NEGATIVE MG/DL
HGB UR QL STRIP.AUTO: NEGATIVE
KETONES UR STRIP-MCNC: NEGATIVE MG/DL
LEUKOCYTE ESTERASE UR QL STRIP: NEGATIVE
NITRITE UR QL STRIP: NEGATIVE
PH UR STRIP.AUTO: 6 [PH]
PROT UR STRIP-MCNC: NEGATIVE MG/DL
SL AMB  POCT GLUCOSE, UA: NORMAL
SL AMB LEUKOCYTE ESTERASE,UA: NORMAL
SL AMB POCT BILIRUBIN,UA: NORMAL
SL AMB POCT BLOOD,UA: NORMAL
SL AMB POCT CLARITY,UA: CLEAR
SL AMB POCT COLOR,UA: YELLOW
SL AMB POCT KETONES,UA: NORMAL
SL AMB POCT NITRITE,UA: NORMAL
SL AMB POCT PH,UA: 6
SL AMB POCT SPECIFIC GRAVITY,UA: 1
SL AMB POCT URINE PROTEIN: NORMAL
SL AMB POCT UROBILINOGEN: NORMAL
SP GR UR STRIP.AUTO: 1 (ref 1–1.03)
UROBILINOGEN UR STRIP-ACNC: <2 MG/DL

## 2025-07-30 PROCEDURE — 99213 OFFICE O/P EST LOW 20 MIN: CPT | Performed by: INTERNAL MEDICINE

## 2025-07-30 PROCEDURE — 81002 URINALYSIS NONAUTO W/O SCOPE: CPT | Performed by: INTERNAL MEDICINE

## 2025-07-30 PROCEDURE — G2211 COMPLEX E/M VISIT ADD ON: HCPCS | Performed by: INTERNAL MEDICINE

## 2025-07-30 PROCEDURE — 81003 URINALYSIS AUTO W/O SCOPE: CPT | Performed by: INTERNAL MEDICINE

## 2025-07-30 RX ORDER — NYSTATIN 100000 [USP'U]/G
POWDER TOPICAL 2 TIMES DAILY
Qty: 60 G | Refills: 1 | Status: SHIPPED | OUTPATIENT
Start: 2025-07-30

## 2025-07-31 ENCOUNTER — OFFICE VISIT (OUTPATIENT)
Dept: DERMATOLOGY | Facility: CLINIC | Age: 70
End: 2025-07-31

## 2025-07-31 VITALS — BODY MASS INDEX: 32.3 KG/M2 | HEIGHT: 66 IN | WEIGHT: 201 LBS

## 2025-07-31 DIAGNOSIS — D22.72 MULTIPLE BENIGN MELANOCYTIC NEVI OF UPPER AND LOWER EXTREMITIES AND TRUNK: ICD-10-CM

## 2025-07-31 DIAGNOSIS — L85.3 XEROSIS OF SKIN: ICD-10-CM

## 2025-07-31 DIAGNOSIS — D22.5 MULTIPLE BENIGN MELANOCYTIC NEVI OF UPPER AND LOWER EXTREMITIES AND TRUNK: ICD-10-CM

## 2025-07-31 DIAGNOSIS — D18.01 CHERRY ANGIOMA: ICD-10-CM

## 2025-07-31 DIAGNOSIS — D22.71 MULTIPLE BENIGN MELANOCYTIC NEVI OF UPPER AND LOWER EXTREMITIES AND TRUNK: ICD-10-CM

## 2025-07-31 DIAGNOSIS — L81.4 LENTIGINES: ICD-10-CM

## 2025-07-31 DIAGNOSIS — Z85.828 HISTORY OF BASAL CELL CARCINOMA (BCC): Primary | ICD-10-CM

## 2025-07-31 DIAGNOSIS — D22.62 MULTIPLE BENIGN MELANOCYTIC NEVI OF UPPER AND LOWER EXTREMITIES AND TRUNK: ICD-10-CM

## 2025-07-31 DIAGNOSIS — I78.1 TELANGIECTASIA OF FACE: ICD-10-CM

## 2025-07-31 DIAGNOSIS — L30.4 INTERTRIGO: ICD-10-CM

## 2025-07-31 DIAGNOSIS — D22.61 MULTIPLE BENIGN MELANOCYTIC NEVI OF UPPER AND LOWER EXTREMITIES AND TRUNK: ICD-10-CM

## 2025-07-31 DIAGNOSIS — Z12.83 SKIN CANCER SCREENING: ICD-10-CM

## 2025-07-31 DIAGNOSIS — L82.1 SK (SEBORRHEIC KERATOSIS): ICD-10-CM

## 2025-08-18 DIAGNOSIS — I10 ESSENTIAL HYPERTENSION: ICD-10-CM

## 2025-08-19 RX ORDER — LISINOPRIL AND HYDROCHLOROTHIAZIDE 12.5; 2 MG/1; MG/1
1 TABLET ORAL DAILY
Qty: 90 TABLET | Refills: 0 | Status: SHIPPED | OUTPATIENT
Start: 2025-08-19

## (undated) DEVICE — TROCAR: Brand: KII FIOS FIRST ENTRY

## (undated) DEVICE — PAD GROUNDING ADULT

## (undated) DEVICE — ADHESIVE SKIN HIGH VISCOSITY EXOFIN 1ML

## (undated) DEVICE — VIAL DECANTER

## (undated) DEVICE — LIGAMAX 5 MM ENDOSCOPIC MULTIPLE CLIP APPLIER: Brand: LIGAMAX

## (undated) DEVICE — DRAPE EQUIPMENT RF WAND

## (undated) DEVICE — STERILE SURGICAL LUBRICANT,  TUBE: Brand: SURGILUBE

## (undated) DEVICE — NEEDLE 22 G X 1 1/2 SAFETY

## (undated) DEVICE — TUBING SMOKE EVAC W/FILTRATION DEVICE PLUMEPORT ACTIV

## (undated) DEVICE — ELECTRODE LAP J HOOK SPLIT STEM E-Z CLEAN 33CM -0021S

## (undated) DEVICE — PACK PBDS LAP CHOLE RF

## (undated) DEVICE — TROCAR APPPLE 5MM EXTENDED LENGTH

## (undated) DEVICE — LIGHT HANDLE COVER SLEEVE DISP BLUE STELLAR

## (undated) DEVICE — GLOVE SRG BIOGEL ECLIPSE 7

## (undated) DEVICE — INTENDED FOR TISSUE SEPARATION, AND OTHER PROCEDURES THAT REQUIRE A SHARP SURGICAL BLADE TO PUNCTURE OR CUT.: Brand: BARD-PARKER SAFETY BLADES SIZE 11, STERILE

## (undated) DEVICE — SUT MONOCRYL 4-0 PS-2 27 IN Y426H

## (undated) DEVICE — TISSUE RETRIEVAL SYSTEM: Brand: INZII RETRIEVAL SYSTEM

## (undated) DEVICE — UNDYED BRAIDED (POLYGLACTIN 910), SYNTHETIC ABSORBABLE SUTURE: Brand: COATED VICRYL